# Patient Record
Sex: MALE | Race: WHITE | NOT HISPANIC OR LATINO | Employment: OTHER | URBAN - METROPOLITAN AREA
[De-identification: names, ages, dates, MRNs, and addresses within clinical notes are randomized per-mention and may not be internally consistent; named-entity substitution may affect disease eponyms.]

---

## 2018-04-17 ENCOUNTER — OFFICE VISIT CONVERTED (OUTPATIENT)
Dept: FAMILY MEDICINE CLINIC | Facility: CLINIC | Age: 58
End: 2018-04-17
Attending: NURSE PRACTITIONER

## 2018-05-22 ENCOUNTER — OFFICE VISIT CONVERTED (OUTPATIENT)
Dept: SURGERY | Facility: CLINIC | Age: 58
End: 2018-05-22
Attending: UROLOGY

## 2018-06-08 ENCOUNTER — OFFICE VISIT CONVERTED (OUTPATIENT)
Dept: SURGERY | Facility: CLINIC | Age: 58
End: 2018-06-08
Attending: UROLOGY

## 2018-07-02 ENCOUNTER — OFFICE VISIT CONVERTED (OUTPATIENT)
Dept: SURGERY | Facility: CLINIC | Age: 58
End: 2018-07-02
Attending: UROLOGY

## 2018-07-20 ENCOUNTER — OFFICE VISIT CONVERTED (OUTPATIENT)
Dept: SURGERY | Facility: CLINIC | Age: 58
End: 2018-07-20
Attending: UROLOGY

## 2018-07-31 ENCOUNTER — OFFICE VISIT CONVERTED (OUTPATIENT)
Dept: SURGERY | Facility: CLINIC | Age: 58
End: 2018-07-31
Attending: SURGERY

## 2018-07-31 ENCOUNTER — CONVERSION ENCOUNTER (OUTPATIENT)
Dept: SURGERY | Facility: CLINIC | Age: 58
End: 2018-07-31

## 2018-08-28 ENCOUNTER — OFFICE VISIT CONVERTED (OUTPATIENT)
Dept: SURGERY | Facility: CLINIC | Age: 58
End: 2018-08-28
Attending: SURGERY

## 2018-10-09 ENCOUNTER — OFFICE VISIT CONVERTED (OUTPATIENT)
Dept: FAMILY MEDICINE CLINIC | Facility: CLINIC | Age: 58
End: 2018-10-09
Attending: NURSE PRACTITIONER

## 2018-11-20 ENCOUNTER — OFFICE VISIT CONVERTED (OUTPATIENT)
Dept: SURGERY | Facility: CLINIC | Age: 58
End: 2018-11-20
Attending: SURGERY

## 2019-04-02 ENCOUNTER — HOSPITAL ENCOUNTER (OUTPATIENT)
Dept: FAMILY MEDICINE CLINIC | Facility: CLINIC | Age: 59
Discharge: HOME | End: 2019-04-02
Attending: NURSE PRACTITIONER

## 2019-04-02 ENCOUNTER — OFFICE VISIT CONVERTED (OUTPATIENT)
Dept: FAMILY MEDICINE CLINIC | Facility: CLINIC | Age: 59
End: 2019-04-02
Attending: NURSE PRACTITIONER

## 2019-04-02 LAB
25(OH)D3 SERPL-MCNC: 18 NG/ML (ref 30–100)
ALBUMIN SERPL-MCNC: 4.2 G/DL (ref 3.5–5)
ALBUMIN/GLOB SERPL: 1.2 {RATIO} (ref 1.4–2.6)
ALP SERPL-CCNC: 83 U/L (ref 56–119)
ALT SERPL-CCNC: 38 U/L (ref 10–40)
ANION GAP SERPL CALC-SCNC: 18 MMOL/L (ref 8–19)
AST SERPL-CCNC: 28 U/L (ref 15–50)
BASOPHILS # BLD AUTO: 0.03 10*3/UL (ref 0–0.2)
BASOPHILS NFR BLD AUTO: 0.4 % (ref 0–3)
BILIRUB SERPL-MCNC: 0.31 MG/DL (ref 0.2–1.3)
BUN SERPL-MCNC: 20 MG/DL (ref 5–25)
BUN/CREAT SERPL: 13 {RATIO} (ref 6–20)
CALCIUM SERPL-MCNC: 9.5 MG/DL (ref 8.7–10.4)
CHLORIDE SERPL-SCNC: 103 MMOL/L (ref 99–111)
CHOLEST SERPL-MCNC: 215 MG/DL (ref 107–200)
CHOLEST/HDLC SERPL: 5.8 {RATIO} (ref 3–6)
CONV ABS IMM GRAN: 0.02 10*3/UL (ref 0–0.2)
CONV CO2: 27 MMOL/L (ref 22–32)
CONV IMMATURE GRAN: 0.3 % (ref 0–1.8)
CONV TOTAL PROTEIN: 7.7 G/DL (ref 6.3–8.2)
CREAT UR-MCNC: 1.6 MG/DL (ref 0.7–1.2)
DEPRECATED RDW RBC AUTO: 50.1 FL (ref 35.1–43.9)
EOSINOPHIL # BLD AUTO: 0.1 10*3/UL (ref 0–0.7)
EOSINOPHIL # BLD AUTO: 1.3 % (ref 0–7)
ERYTHROCYTE [DISTWIDTH] IN BLOOD BY AUTOMATED COUNT: 14.6 % (ref 11.6–14.4)
GFR SERPLBLD BASED ON 1.73 SQ M-ARVRAT: 46 ML/MIN/{1.73_M2}
GLOBULIN UR ELPH-MCNC: 3.5 G/DL (ref 2–3.5)
GLUCOSE SERPL-MCNC: 106 MG/DL (ref 70–99)
HBA1C MFR BLD: 14 G/DL (ref 14–18)
HCT VFR BLD AUTO: 44.2 % (ref 42–52)
HDLC SERPL-MCNC: 37 MG/DL (ref 40–60)
LDLC SERPL CALC-MCNC: 127 MG/DL (ref 70–100)
LYMPHOCYTES # BLD AUTO: 1.67 10*3/UL (ref 1–5)
MCH RBC QN AUTO: 29.1 PG (ref 27–31)
MCHC RBC AUTO-ENTMCNC: 31.7 G/DL (ref 33–37)
MCV RBC AUTO: 91.9 FL (ref 80–96)
MONOCYTES # BLD AUTO: 0.44 10*3/UL (ref 0.2–1.2)
MONOCYTES NFR BLD AUTO: 5.7 % (ref 3–10)
NEUTROPHILS # BLD AUTO: 5.46 10*3/UL (ref 2–8)
NEUTROPHILS NFR BLD AUTO: 70.7 % (ref 30–85)
NRBC CBCN: 0 % (ref 0–0.7)
OSMOLALITY SERPL CALC.SUM OF ELEC: 299 MOSM/KG (ref 273–304)
PLATELET # BLD AUTO: 252 10*3/UL (ref 130–400)
PMV BLD AUTO: 10.8 FL (ref 9.4–12.4)
POTASSIUM SERPL-SCNC: 4.7 MMOL/L (ref 3.5–5.3)
RBC # BLD AUTO: 4.81 10*6/UL (ref 4.7–6.1)
SODIUM SERPL-SCNC: 143 MMOL/L (ref 135–147)
T4 FREE SERPL-MCNC: 1.1 NG/DL (ref 0.9–1.8)
TRIGL SERPL-MCNC: 255 MG/DL (ref 40–150)
TSH SERPL-ACNC: 3.5 M[IU]/L (ref 0.27–4.2)
URATE SERPL-MCNC: 10.9 MG/DL (ref 3.5–8.5)
VARIANT LYMPHS NFR BLD MANUAL: 21.6 % (ref 20–45)
VLDLC SERPL-MCNC: 51 MG/DL (ref 5–37)
WBC # BLD AUTO: 7.72 10*3/UL (ref 4.8–10.8)

## 2019-06-27 ENCOUNTER — OFFICE VISIT CONVERTED (OUTPATIENT)
Dept: ORTHOPEDIC SURGERY | Facility: CLINIC | Age: 59
End: 2019-06-27
Attending: ORTHOPAEDIC SURGERY

## 2019-06-27 ENCOUNTER — CONVERSION ENCOUNTER (OUTPATIENT)
Dept: ORTHOPEDIC SURGERY | Facility: CLINIC | Age: 59
End: 2019-06-27

## 2019-10-01 ENCOUNTER — HOSPITAL ENCOUNTER (OUTPATIENT)
Dept: FAMILY MEDICINE CLINIC | Facility: CLINIC | Age: 59
Discharge: HOME | End: 2019-10-01
Attending: NURSE PRACTITIONER

## 2019-10-01 ENCOUNTER — OFFICE VISIT CONVERTED (OUTPATIENT)
Dept: FAMILY MEDICINE CLINIC | Facility: CLINIC | Age: 59
End: 2019-10-01
Attending: NURSE PRACTITIONER

## 2019-10-01 LAB
25(OH)D3 SERPL-MCNC: 24.1 NG/ML (ref 30–100)
ALBUMIN SERPL-MCNC: 4.3 G/DL (ref 3.5–5)
ALBUMIN/GLOB SERPL: 1.2 {RATIO} (ref 1.4–2.6)
ALP SERPL-CCNC: 69 U/L (ref 56–119)
ALT SERPL-CCNC: 23 U/L (ref 10–40)
ANION GAP SERPL CALC-SCNC: 23 MMOL/L (ref 8–19)
AST SERPL-CCNC: 22 U/L (ref 15–50)
BASOPHILS # BLD AUTO: 0.03 10*3/UL (ref 0–0.2)
BASOPHILS NFR BLD AUTO: 0.3 % (ref 0–3)
BILIRUB SERPL-MCNC: 0.29 MG/DL (ref 0.2–1.3)
BUN SERPL-MCNC: 28 MG/DL (ref 5–25)
BUN/CREAT SERPL: 18 {RATIO} (ref 6–20)
CALCIUM SERPL-MCNC: 10 MG/DL (ref 8.7–10.4)
CHLORIDE SERPL-SCNC: 101 MMOL/L (ref 99–111)
CHOLEST SERPL-MCNC: 236 MG/DL (ref 107–200)
CHOLEST/HDLC SERPL: 5.4 {RATIO} (ref 3–6)
CONV ABS IMM GRAN: 0.03 10*3/UL (ref 0–0.2)
CONV CO2: 22 MMOL/L (ref 22–32)
CONV IMMATURE GRAN: 0.3 % (ref 0–1.8)
CONV TOTAL PROTEIN: 7.9 G/DL (ref 6.3–8.2)
CREAT UR-MCNC: 1.58 MG/DL (ref 0.7–1.2)
DEPRECATED RDW RBC AUTO: 47.1 FL (ref 35.1–43.9)
EOSINOPHIL # BLD AUTO: 0.17 10*3/UL (ref 0–0.7)
EOSINOPHIL # BLD AUTO: 1.8 % (ref 0–7)
ERYTHROCYTE [DISTWIDTH] IN BLOOD BY AUTOMATED COUNT: 14.2 % (ref 11.6–14.4)
GFR SERPLBLD BASED ON 1.73 SQ M-ARVRAT: 47 ML/MIN/{1.73_M2}
GLOBULIN UR ELPH-MCNC: 3.6 G/DL (ref 2–3.5)
GLUCOSE SERPL-MCNC: 94 MG/DL (ref 70–99)
HCT VFR BLD AUTO: 43.3 % (ref 42–52)
HDLC SERPL-MCNC: 44 MG/DL (ref 40–60)
HGB BLD-MCNC: 13.8 G/DL (ref 14–18)
LDLC SERPL CALC-MCNC: 160 MG/DL (ref 70–100)
LYMPHOCYTES # BLD AUTO: 1.56 10*3/UL (ref 1–5)
LYMPHOCYTES NFR BLD AUTO: 16.4 % (ref 20–45)
MCH RBC QN AUTO: 29.1 PG (ref 27–31)
MCHC RBC AUTO-ENTMCNC: 31.9 G/DL (ref 33–37)
MCV RBC AUTO: 91.4 FL (ref 80–96)
MONOCYTES # BLD AUTO: 0.59 10*3/UL (ref 0.2–1.2)
MONOCYTES NFR BLD AUTO: 6.2 % (ref 3–10)
NEUTROPHILS # BLD AUTO: 7.13 10*3/UL (ref 2–8)
NEUTROPHILS NFR BLD AUTO: 75 % (ref 30–85)
NRBC CBCN: 0 % (ref 0–0.7)
OSMOLALITY SERPL CALC.SUM OF ELEC: 299 MOSM/KG (ref 273–304)
PLATELET # BLD AUTO: 280 10*3/UL (ref 130–400)
PMV BLD AUTO: 10.3 FL (ref 9.4–12.4)
POTASSIUM SERPL-SCNC: 4.3 MMOL/L (ref 3.5–5.3)
PSA SERPL-MCNC: 2.21 NG/ML (ref 0–4)
RBC # BLD AUTO: 4.74 10*6/UL (ref 4.7–6.1)
SODIUM SERPL-SCNC: 142 MMOL/L (ref 135–147)
T4 FREE SERPL-MCNC: 1.1 NG/DL (ref 0.9–1.8)
TRIGL SERPL-MCNC: 159 MG/DL (ref 40–150)
TSH SERPL-ACNC: 2.99 M[IU]/L (ref 0.27–4.2)
URATE SERPL-MCNC: 5.5 MG/DL (ref 3.5–8.5)
VLDLC SERPL-MCNC: 32 MG/DL (ref 5–37)
WBC # BLD AUTO: 9.51 10*3/UL (ref 4.8–10.8)

## 2019-10-24 ENCOUNTER — OFFICE VISIT CONVERTED (OUTPATIENT)
Dept: FAMILY MEDICINE CLINIC | Facility: CLINIC | Age: 59
End: 2019-10-24
Attending: NURSE PRACTITIONER

## 2020-01-01 ENCOUNTER — OFFICE VISIT CONVERTED (OUTPATIENT)
Dept: FAMILY MEDICINE CLINIC | Facility: CLINIC | Age: 60
End: 2020-01-01
Attending: NURSE PRACTITIONER

## 2020-01-01 ENCOUNTER — HOSPITAL ENCOUNTER (OUTPATIENT)
Dept: GENERAL RADIOLOGY | Facility: HOSPITAL | Age: 60
Discharge: HOME | End: 2020-10-02
Attending: NURSE PRACTITIONER

## 2020-01-01 LAB
25(OH)D3 SERPL-MCNC: 28.7 NG/ML (ref 30–100)
ALBUMIN SERPL-MCNC: 4.4 G/DL (ref 3.5–5)
ALBUMIN/GLOB SERPL: 1.3 {RATIO} (ref 1.4–2.6)
ALP SERPL-CCNC: 75 U/L (ref 56–155)
ALT SERPL-CCNC: 31 U/L (ref 10–40)
ANION GAP SERPL CALC-SCNC: 22 MMOL/L (ref 8–19)
AST SERPL-CCNC: 29 U/L (ref 15–50)
BASOPHILS # BLD AUTO: 0.04 10*3/UL (ref 0–0.2)
BASOPHILS NFR BLD AUTO: 0.4 % (ref 0–3)
BILIRUB SERPL-MCNC: 0.33 MG/DL (ref 0.2–1.3)
BUN SERPL-MCNC: 31 MG/DL (ref 5–25)
BUN/CREAT SERPL: 16 {RATIO} (ref 6–20)
CALCIUM SERPL-MCNC: 10.3 MG/DL (ref 8.7–10.4)
CHLORIDE SERPL-SCNC: 103 MMOL/L (ref 99–111)
CHOLEST SERPL-MCNC: 247 MG/DL (ref 107–200)
CHOLEST/HDLC SERPL: 7.1 {RATIO} (ref 3–6)
CONV ABS IMM GRAN: 0.05 10*3/UL (ref 0–0.2)
CONV CO2: 23 MMOL/L (ref 22–32)
CONV IMMATURE GRAN: 0.5 % (ref 0–1.8)
CONV TOTAL PROTEIN: 7.7 G/DL (ref 6.3–8.2)
CREAT UR-MCNC: 1.93 MG/DL (ref 0.7–1.2)
DEPRECATED RDW RBC AUTO: 48 FL (ref 35.1–43.9)
EOSINOPHIL # BLD AUTO: 0.05 10*3/UL (ref 0–0.7)
EOSINOPHIL # BLD AUTO: 0.5 % (ref 0–7)
ERYTHROCYTE [DISTWIDTH] IN BLOOD BY AUTOMATED COUNT: 14.3 % (ref 11.6–14.4)
GFR SERPLBLD BASED ON 1.73 SQ M-ARVRAT: 36 ML/MIN/{1.73_M2}
GLOBULIN UR ELPH-MCNC: 3.3 G/DL (ref 2–3.5)
GLUCOSE SERPL-MCNC: 109 MG/DL (ref 70–99)
HCT VFR BLD AUTO: 43.9 % (ref 42–52)
HDLC SERPL-MCNC: 35 MG/DL (ref 40–60)
HGB BLD-MCNC: 13.7 G/DL (ref 14–18)
LDLC SERPL CALC-MCNC: 145 MG/DL (ref 70–100)
LYMPHOCYTES # BLD AUTO: 1.79 10*3/UL (ref 1–5)
LYMPHOCYTES NFR BLD AUTO: 16.8 % (ref 20–45)
MCH RBC QN AUTO: 28.5 PG (ref 27–31)
MCHC RBC AUTO-ENTMCNC: 31.2 G/DL (ref 33–37)
MCV RBC AUTO: 91.3 FL (ref 80–96)
MONOCYTES # BLD AUTO: 0.56 10*3/UL (ref 0.2–1.2)
MONOCYTES NFR BLD AUTO: 5.2 % (ref 3–10)
NEUTROPHILS # BLD AUTO: 8.18 10*3/UL (ref 2–8)
NEUTROPHILS NFR BLD AUTO: 76.6 % (ref 30–85)
NRBC CBCN: 0 % (ref 0–0.7)
OSMOLALITY SERPL CALC.SUM OF ELEC: 305 MOSM/KG (ref 273–304)
PLATELET # BLD AUTO: 301 10*3/UL (ref 130–400)
PMV BLD AUTO: 10.7 FL (ref 9.4–12.4)
POTASSIUM SERPL-SCNC: 4.4 MMOL/L (ref 3.5–5.3)
PSA SERPL-MCNC: 2.33 NG/ML (ref 0–4)
RBC # BLD AUTO: 4.81 10*6/UL (ref 4.7–6.1)
SODIUM SERPL-SCNC: 144 MMOL/L (ref 135–147)
TRIGL SERPL-MCNC: 336 MG/DL (ref 40–150)
TSH SERPL-ACNC: 2.28 M[IU]/L (ref 0.27–4.2)
URATE SERPL-MCNC: 7.1 MG/DL (ref 3.5–8.5)
VLDLC SERPL-MCNC: 67 MG/DL (ref 5–37)
WBC # BLD AUTO: 10.67 10*3/UL (ref 4.8–10.8)

## 2020-03-18 ENCOUNTER — OFFICE VISIT CONVERTED (OUTPATIENT)
Dept: FAMILY MEDICINE CLINIC | Facility: CLINIC | Age: 60
End: 2020-03-18
Attending: NURSE PRACTITIONER

## 2020-03-31 ENCOUNTER — TELEMEDICINE CONVERTED (OUTPATIENT)
Dept: FAMILY MEDICINE CLINIC | Facility: CLINIC | Age: 60
End: 2020-03-31
Attending: NURSE PRACTITIONER

## 2021-01-01 ENCOUNTER — TELEPHONE (OUTPATIENT)
Dept: FAMILY MEDICINE CLINIC | Facility: CLINIC | Age: 61
End: 2021-01-01

## 2021-01-01 ENCOUNTER — APPOINTMENT (OUTPATIENT)
Dept: GENERAL RADIOLOGY | Facility: HOSPITAL | Age: 61
End: 2021-01-01

## 2021-01-01 ENCOUNTER — PREP FOR SURGERY (OUTPATIENT)
Dept: OTHER | Facility: HOSPITAL | Age: 61
End: 2021-01-01

## 2021-01-01 ENCOUNTER — ANESTHESIA EVENT (OUTPATIENT)
Dept: PERIOP | Facility: HOSPITAL | Age: 61
End: 2021-01-01

## 2021-01-01 ENCOUNTER — HOSPITAL ENCOUNTER (INPATIENT)
Facility: HOSPITAL | Age: 61
LOS: 10 days | End: 2021-09-16
Attending: EMERGENCY MEDICINE | Admitting: INTERNAL MEDICINE

## 2021-01-01 ENCOUNTER — APPOINTMENT (OUTPATIENT)
Dept: ULTRASOUND IMAGING | Facility: HOSPITAL | Age: 61
End: 2021-01-01

## 2021-01-01 ENCOUNTER — ANESTHESIA (OUTPATIENT)
Dept: PERIOP | Facility: HOSPITAL | Age: 61
End: 2021-01-01

## 2021-01-01 ENCOUNTER — HOSPITAL ENCOUNTER (OUTPATIENT)
Dept: FAMILY MEDICINE CLINIC | Facility: CLINIC | Age: 61
Discharge: HOME | End: 2021-04-01
Attending: NURSE PRACTITIONER

## 2021-01-01 ENCOUNTER — ANESTHESIA EVENT (OUTPATIENT)
Dept: ICU | Facility: HOSPITAL | Age: 61
End: 2021-01-01

## 2021-01-01 ENCOUNTER — OFFICE VISIT (OUTPATIENT)
Dept: FAMILY MEDICINE CLINIC | Facility: CLINIC | Age: 61
End: 2021-01-01

## 2021-01-01 ENCOUNTER — APPOINTMENT (OUTPATIENT)
Dept: CT IMAGING | Facility: HOSPITAL | Age: 61
End: 2021-01-01

## 2021-01-01 ENCOUNTER — OFFICE VISIT CONVERTED (OUTPATIENT)
Dept: FAMILY MEDICINE CLINIC | Facility: CLINIC | Age: 61
End: 2021-01-01
Attending: NURSE PRACTITIONER

## 2021-01-01 ENCOUNTER — APPOINTMENT (OUTPATIENT)
Dept: CARDIOLOGY | Facility: HOSPITAL | Age: 61
End: 2021-01-01

## 2021-01-01 VITALS
DIASTOLIC BLOOD PRESSURE: 82 MMHG | WEIGHT: 209.25 LBS | SYSTOLIC BLOOD PRESSURE: 137 MMHG | HEIGHT: 69 IN | OXYGEN SATURATION: 97 % | TEMPERATURE: 97.4 F | HEART RATE: 72 BPM | RESPIRATION RATE: 16 BRPM | BODY MASS INDEX: 30.99 KG/M2

## 2021-01-01 VITALS
TEMPERATURE: 97.2 F | WEIGHT: 213.31 LBS | HEIGHT: 69 IN | DIASTOLIC BLOOD PRESSURE: 82 MMHG | OXYGEN SATURATION: 96 % | RESPIRATION RATE: 20 BRPM | HEART RATE: 61 BPM | SYSTOLIC BLOOD PRESSURE: 150 MMHG | BODY MASS INDEX: 31.6 KG/M2

## 2021-01-01 VITALS
DIASTOLIC BLOOD PRESSURE: 86 MMHG | HEART RATE: 68 BPM | WEIGHT: 198.44 LBS | OXYGEN SATURATION: 98 % | TEMPERATURE: 98.6 F | SYSTOLIC BLOOD PRESSURE: 150 MMHG | HEIGHT: 69 IN | BODY MASS INDEX: 29.39 KG/M2 | RESPIRATION RATE: 12 BRPM

## 2021-01-01 VITALS — RESPIRATION RATE: 14 BRPM | WEIGHT: 200 LBS | BODY MASS INDEX: 29.62 KG/M2 | HEIGHT: 69 IN

## 2021-01-01 VITALS
DIASTOLIC BLOOD PRESSURE: 103 MMHG | SYSTOLIC BLOOD PRESSURE: 172 MMHG | HEART RATE: 59 BPM | TEMPERATURE: 97.9 F | RESPIRATION RATE: 12 BRPM | BODY MASS INDEX: 30.21 KG/M2 | HEIGHT: 69 IN | OXYGEN SATURATION: 98 % | WEIGHT: 204 LBS

## 2021-01-01 VITALS
OXYGEN SATURATION: 96 % | BODY MASS INDEX: 31.61 KG/M2 | WEIGHT: 213.44 LBS | TEMPERATURE: 96.9 F | RESPIRATION RATE: 14 BRPM | HEIGHT: 69 IN | HEART RATE: 70 BPM | DIASTOLIC BLOOD PRESSURE: 85 MMHG | SYSTOLIC BLOOD PRESSURE: 130 MMHG

## 2021-01-01 VITALS
HEIGHT: 69 IN | OXYGEN SATURATION: 99 % | SYSTOLIC BLOOD PRESSURE: 150 MMHG | BODY MASS INDEX: 32.45 KG/M2 | RESPIRATION RATE: 14 BRPM | TEMPERATURE: 97.7 F | DIASTOLIC BLOOD PRESSURE: 98 MMHG | HEART RATE: 63 BPM | WEIGHT: 219.1 LBS

## 2021-01-01 VITALS
OXYGEN SATURATION: 98 % | HEART RATE: 76 BPM | SYSTOLIC BLOOD PRESSURE: 156 MMHG | HEIGHT: 69 IN | TEMPERATURE: 98.7 F | BODY MASS INDEX: 33.08 KG/M2 | WEIGHT: 223.37 LBS | RESPIRATION RATE: 12 BRPM | DIASTOLIC BLOOD PRESSURE: 97 MMHG

## 2021-01-01 VITALS
HEART RATE: 67 BPM | BODY MASS INDEX: 31.47 KG/M2 | TEMPERATURE: 98.3 F | DIASTOLIC BLOOD PRESSURE: 95 MMHG | HEIGHT: 69 IN | SYSTOLIC BLOOD PRESSURE: 164 MMHG | WEIGHT: 212.5 LBS | OXYGEN SATURATION: 98 % | RESPIRATION RATE: 12 BRPM

## 2021-01-01 VITALS — BODY MASS INDEX: 29.62 KG/M2 | RESPIRATION RATE: 16 BRPM | WEIGHT: 200 LBS | HEIGHT: 69 IN

## 2021-01-01 VITALS — HEART RATE: 72 BPM | HEIGHT: 69 IN | BODY MASS INDEX: 29.68 KG/M2 | WEIGHT: 200.37 LBS | OXYGEN SATURATION: 99 %

## 2021-01-01 VITALS — RESPIRATION RATE: 14 BRPM | BODY MASS INDEX: 29.92 KG/M2 | HEIGHT: 69 IN | WEIGHT: 202 LBS

## 2021-01-01 VITALS
WEIGHT: 201.31 LBS | RESPIRATION RATE: 12 BRPM | TEMPERATURE: 98.2 F | OXYGEN SATURATION: 99 % | HEIGHT: 69 IN | HEART RATE: 59 BPM | DIASTOLIC BLOOD PRESSURE: 95 MMHG | BODY MASS INDEX: 29.82 KG/M2 | SYSTOLIC BLOOD PRESSURE: 163 MMHG

## 2021-01-01 VITALS
OXYGEN SATURATION: 91 % | TEMPERATURE: 98.8 F | HEART RATE: 135 BPM | RESPIRATION RATE: 22 BRPM | WEIGHT: 215.61 LBS | DIASTOLIC BLOOD PRESSURE: 94 MMHG | SYSTOLIC BLOOD PRESSURE: 108 MMHG | BODY MASS INDEX: 31.93 KG/M2 | HEIGHT: 69 IN

## 2021-01-01 VITALS — RESPIRATION RATE: 16 BRPM | HEIGHT: 69 IN | BODY MASS INDEX: 29.92 KG/M2 | WEIGHT: 202 LBS

## 2021-01-01 VITALS — WEIGHT: 200 LBS | BODY MASS INDEX: 29.62 KG/M2 | HEIGHT: 69 IN | RESPIRATION RATE: 16 BRPM

## 2021-01-01 VITALS — BODY MASS INDEX: 29.62 KG/M2 | WEIGHT: 200 LBS | HEIGHT: 69 IN | RESPIRATION RATE: 16 BRPM

## 2021-01-01 VITALS — BODY MASS INDEX: 29.92 KG/M2 | HEIGHT: 69 IN | WEIGHT: 202 LBS | RESPIRATION RATE: 16 BRPM

## 2021-01-01 DIAGNOSIS — J12.82 PNEUMONIA DUE TO COVID-19 VIRUS: ICD-10-CM

## 2021-01-01 DIAGNOSIS — M10.9 GOUT, UNSPECIFIED CAUSE, UNSPECIFIED CHRONICITY, UNSPECIFIED SITE: Primary | ICD-10-CM

## 2021-01-01 DIAGNOSIS — N13.30 HYDRONEPHROSIS: ICD-10-CM

## 2021-01-01 DIAGNOSIS — M1A.0790 IDIOPATHIC CHRONIC GOUT OF ANKLE WITHOUT TOPHUS, UNSPECIFIED LATERALITY: Primary | ICD-10-CM

## 2021-01-01 DIAGNOSIS — N17.9 AKI (ACUTE KIDNEY INJURY) (HCC): Primary | ICD-10-CM

## 2021-01-01 DIAGNOSIS — M10.9 GOUT, UNSPECIFIED CAUSE, UNSPECIFIED CHRONICITY, UNSPECIFIED SITE: ICD-10-CM

## 2021-01-01 DIAGNOSIS — U07.1 PNEUMONIA DUE TO COVID-19 VIRUS: ICD-10-CM

## 2021-01-01 LAB
027 TOXIN: NORMAL
25(OH)D3 SERPL-MCNC: 29.8 NG/ML (ref 30–100)
ALBUMIN SERPL-MCNC: 2.3 G/DL (ref 3.5–5.2)
ALBUMIN SERPL-MCNC: 2.8 G/DL (ref 3.5–5.2)
ALBUMIN SERPL-MCNC: 3 G/DL (ref 3.5–5.2)
ALBUMIN SERPL-MCNC: 3 G/DL (ref 3.5–5.2)
ALBUMIN SERPL-MCNC: 3.1 G/DL (ref 3.5–5.2)
ALBUMIN SERPL-MCNC: 3.2 G/DL (ref 3.5–5.2)
ALBUMIN SERPL-MCNC: 3.3 G/DL (ref 3.5–5.2)
ALBUMIN SERPL-MCNC: 3.3 G/DL (ref 3.5–5.2)
ALBUMIN SERPL-MCNC: 3.4 G/DL (ref 3.5–5.2)
ALBUMIN SERPL-MCNC: 3.7 G/DL (ref 3.5–5.2)
ALBUMIN SERPL-MCNC: 4.1 G/DL (ref 3.5–5)
ALBUMIN/GLOB SERPL: 0.8 G/DL
ALBUMIN/GLOB SERPL: 0.9 G/DL
ALBUMIN/GLOB SERPL: 1 G/DL
ALBUMIN/GLOB SERPL: 1.1 G/DL
ALBUMIN/GLOB SERPL: 1.3 {RATIO} (ref 1.4–2.6)
ALP SERPL-CCNC: 110 U/L (ref 39–117)
ALP SERPL-CCNC: 127 U/L (ref 39–117)
ALP SERPL-CCNC: 136 U/L (ref 39–117)
ALP SERPL-CCNC: 58 U/L (ref 39–117)
ALP SERPL-CCNC: 61 U/L (ref 39–117)
ALP SERPL-CCNC: 77 U/L (ref 56–155)
ALP SERPL-CCNC: 82 U/L (ref 39–117)
ALP SERPL-CCNC: 91 U/L (ref 39–117)
ALT SERPL W P-5'-P-CCNC: 28 U/L (ref 1–41)
ALT SERPL W P-5'-P-CCNC: 30 U/L (ref 1–41)
ALT SERPL W P-5'-P-CCNC: 32 U/L (ref 1–41)
ALT SERPL W P-5'-P-CCNC: 33 U/L (ref 1–41)
ALT SERPL W P-5'-P-CCNC: 35 U/L (ref 1–41)
ALT SERPL W P-5'-P-CCNC: 46 U/L (ref 1–41)
ALT SERPL W P-5'-P-CCNC: 56 U/L (ref 1–41)
ALT SERPL-CCNC: 24 U/L (ref 10–40)
ANION GAP SERPL CALC-SCNC: 16 MMOL/L (ref 8–19)
ANION GAP SERPL CALCULATED.3IONS-SCNC: 13.7 MMOL/L (ref 5–15)
ANION GAP SERPL CALCULATED.3IONS-SCNC: 14.2 MMOL/L (ref 5–15)
ANION GAP SERPL CALCULATED.3IONS-SCNC: 15.1 MMOL/L (ref 5–15)
ANION GAP SERPL CALCULATED.3IONS-SCNC: 15.2 MMOL/L (ref 5–15)
ANION GAP SERPL CALCULATED.3IONS-SCNC: 15.4 MMOL/L (ref 5–15)
ANION GAP SERPL CALCULATED.3IONS-SCNC: 15.9 MMOL/L (ref 5–15)
ANION GAP SERPL CALCULATED.3IONS-SCNC: 17.6 MMOL/L (ref 5–15)
ANION GAP SERPL CALCULATED.3IONS-SCNC: 18.1 MMOL/L (ref 5–15)
ANION GAP SERPL CALCULATED.3IONS-SCNC: 18.5 MMOL/L (ref 5–15)
ANION GAP SERPL CALCULATED.3IONS-SCNC: 18.6 MMOL/L (ref 5–15)
ANION GAP SERPL CALCULATED.3IONS-SCNC: 19.3 MMOL/L (ref 5–15)
ANION GAP SERPL CALCULATED.3IONS-SCNC: 20 MMOL/L (ref 5–15)
ANION GAP SERPL CALCULATED.3IONS-SCNC: 21 MMOL/L (ref 5–15)
ANION GAP SERPL CALCULATED.3IONS-SCNC: 30.6 MMOL/L (ref 5–15)
APTT PPP: 21.9 SECONDS (ref 22.2–34.2)
APTT PPP: 25.2 SECONDS (ref 22.2–34.2)
APTT PPP: 26.3 SECONDS (ref 22.2–34.2)
ARTERIAL PATENCY WRIST A: ABNORMAL
ARTERIAL PATENCY WRIST A: POSITIVE
AST SERPL-CCNC: 22 U/L (ref 15–50)
AST SERPL-CCNC: 34 U/L (ref 1–40)
AST SERPL-CCNC: 34 U/L (ref 1–40)
AST SERPL-CCNC: 35 U/L (ref 1–40)
AST SERPL-CCNC: 44 U/L (ref 1–40)
AST SERPL-CCNC: 49 U/L (ref 1–40)
AST SERPL-CCNC: 68 U/L (ref 1–40)
AST SERPL-CCNC: 89 U/L (ref 1–40)
BACTERIA SPEC AEROBE CULT: NO GROWTH
BACTERIA UR QL AUTO: ABNORMAL /HPF
BACTERIA UR QL AUTO: ABNORMAL /HPF
BASE EXCESS BLDA CALC-SCNC: -0.8 MMOL/L (ref -2–2)
BASE EXCESS BLDA CALC-SCNC: -2.3 MMOL/L (ref -2–2)
BASE EXCESS BLDA CALC-SCNC: -3.1 MMOL/L (ref -2–2)
BASE EXCESS BLDA CALC-SCNC: -7.6 MMOL/L (ref -2–2)
BASOPHILS # BLD AUTO: 0 10*3/MM3 (ref 0–0.2)
BASOPHILS # BLD AUTO: 0.01 10*3/MM3 (ref 0–0.2)
BASOPHILS # BLD AUTO: 0.02 10*3/UL (ref 0–0.2)
BASOPHILS # BLD AUTO: 0.03 10*3/MM3 (ref 0–0.2)
BASOPHILS # BLD AUTO: 0.08 10*3/MM3 (ref 0–0.2)
BASOPHILS # BLD AUTO: 0.13 10*3/MM3 (ref 0–0.2)
BASOPHILS NFR BLD AUTO: 0 % (ref 0–1.5)
BASOPHILS NFR BLD AUTO: 0.1 % (ref 0–1.5)
BASOPHILS NFR BLD AUTO: 0.2 % (ref 0–3)
BASOPHILS NFR BLD AUTO: 0.3 % (ref 0–1.5)
BDY SITE: ABNORMAL
BH CV ECHO MEAS - AO ROOT DIAM: 3 CM
BH CV ECHO MEAS - EDV(MOD-SP4): 57 ML
BH CV ECHO MEAS - ESV(MOD-SP4): 25 ML
BH CV ECHO MEAS - IVSD: 1.3 CM
BH CV ECHO MEAS - LA DIMENSION(2D): 3.6 CM
BH CV ECHO MEAS - LVIDD: 4.6 CM
BH CV ECHO MEAS - LVIDS: 2.7 CM
BH CV ECHO MEAS - LVPWD: 1.2 CM
BH CV LOW VAS RIGHT SOLEAL VESSEL: 1
BH CV LOWER VASCULAR LEFT POPLITEAL AUGMENT: NORMAL
BH CV LOWER VASCULAR LEFT POPLITEAL COMPETENT: NORMAL
BH CV LOWER VASCULAR LEFT POPLITEAL COMPRESS: NORMAL
BH CV LOWER VASCULAR LEFT POPLITEAL PHASIC: NORMAL
BH CV LOWER VASCULAR LEFT POPLITEAL SPONT: NORMAL
BH CV LOWER VASCULAR RIGHT COMMON FEMORAL AUGMENT: NORMAL
BH CV LOWER VASCULAR RIGHT COMMON FEMORAL COMPETENT: NORMAL
BH CV LOWER VASCULAR RIGHT COMMON FEMORAL COMPRESS: NORMAL
BH CV LOWER VASCULAR RIGHT COMMON FEMORAL PHASIC: NORMAL
BH CV LOWER VASCULAR RIGHT COMMON FEMORAL SPONT: NORMAL
BH CV LOWER VASCULAR RIGHT MID FEMORAL AUGMENT: NORMAL
BH CV LOWER VASCULAR RIGHT MID FEMORAL COMPETENT: NORMAL
BH CV LOWER VASCULAR RIGHT MID FEMORAL COMPRESS: NORMAL
BH CV LOWER VASCULAR RIGHT MID FEMORAL PHASIC: NORMAL
BH CV LOWER VASCULAR RIGHT MID FEMORAL SPONT: NORMAL
BH CV LOWER VASCULAR RIGHT POPLITEAL AUGMENT: NORMAL
BH CV LOWER VASCULAR RIGHT POPLITEAL COMPETENT: NORMAL
BH CV LOWER VASCULAR RIGHT POPLITEAL COMPRESS: NORMAL
BH CV LOWER VASCULAR RIGHT POPLITEAL PHASIC: NORMAL
BH CV LOWER VASCULAR RIGHT POPLITEAL SPONT: NORMAL
BH CV LOWER VASCULAR RIGHT PROXIMAL FEMORAL COMPRESS: NORMAL
BH CV LOWER VASCULAR RIGHT SOLEAL COMPRESS: NORMAL
BH CV LOWER VASCULAR RIGHT SOLEAL THROMBUS: NORMAL
BILIRUB SERPL-MCNC: 0.3 MG/DL (ref 0–1.2)
BILIRUB SERPL-MCNC: 0.35 MG/DL (ref 0.2–1.3)
BILIRUB SERPL-MCNC: 0.4 MG/DL (ref 0–1.2)
BILIRUB SERPL-MCNC: 0.4 MG/DL (ref 0–1.2)
BILIRUB SERPL-MCNC: 0.6 MG/DL (ref 0–1.2)
BILIRUB SERPL-MCNC: 1 MG/DL (ref 0–1.2)
BILIRUB SERPL-MCNC: 1.2 MG/DL (ref 0–1.2)
BILIRUB SERPL-MCNC: 1.9 MG/DL (ref 0–1.2)
BILIRUB UR QL STRIP: ABNORMAL
BILIRUB UR QL STRIP: NEGATIVE
BUN SERPL-MCNC: 101 MG/DL (ref 8–23)
BUN SERPL-MCNC: 108 MG/DL (ref 8–23)
BUN SERPL-MCNC: 110 MG/DL (ref 8–23)
BUN SERPL-MCNC: 123 MG/DL (ref 8–23)
BUN SERPL-MCNC: 126 MG/DL (ref 8–23)
BUN SERPL-MCNC: 138 MG/DL (ref 8–23)
BUN SERPL-MCNC: 139 MG/DL (ref 8–23)
BUN SERPL-MCNC: 141 MG/DL (ref 8–23)
BUN SERPL-MCNC: 157 MG/DL (ref 8–23)
BUN SERPL-MCNC: 29 MG/DL (ref 5–25)
BUN SERPL-MCNC: 77 MG/DL (ref 8–23)
BUN SERPL-MCNC: 77 MG/DL (ref 8–23)
BUN SERPL-MCNC: 81 MG/DL (ref 8–23)
BUN SERPL-MCNC: 83 MG/DL (ref 8–23)
BUN SERPL-MCNC: 92 MG/DL (ref 8–23)
BUN/CREAT SERPL: 11.2 (ref 7–25)
BUN/CREAT SERPL: 13.2 (ref 7–25)
BUN/CREAT SERPL: 14.9 (ref 7–25)
BUN/CREAT SERPL: 16.9 (ref 7–25)
BUN/CREAT SERPL: 17 {RATIO} (ref 6–20)
BUN/CREAT SERPL: 21.9 (ref 7–25)
BUN/CREAT SERPL: 24.4 (ref 7–25)
BUN/CREAT SERPL: 25.2 (ref 7–25)
BUN/CREAT SERPL: 26.5 (ref 7–25)
BUN/CREAT SERPL: 28.6 (ref 7–25)
BUN/CREAT SERPL: 30.8 (ref 7–25)
BUN/CREAT SERPL: 31.7 (ref 7–25)
BUN/CREAT SERPL: 36.9 (ref 7–25)
BUN/CREAT SERPL: 45.4 (ref 7–25)
BUN/CREAT SERPL: 47 (ref 7–25)
C DIFF TOX GENS STL QL NAA+PROBE: NEGATIVE
CA-I BLDA-SCNC: 0.75 MMOL/L (ref 1.13–1.32)
CA-I BLDA-SCNC: 0.95 MMOL/L (ref 1.13–1.32)
CA-I BLDA-SCNC: 1.25 MMOL/L (ref 1.13–1.32)
CALCIUM SERPL-MCNC: 9.7 MG/DL (ref 8.7–10.4)
CALCIUM SPEC-SCNC: 10.3 MG/DL (ref 8.6–10.5)
CALCIUM SPEC-SCNC: 4.8 MG/DL (ref 8.6–10.5)
CALCIUM SPEC-SCNC: 6.9 MG/DL (ref 8.6–10.5)
CALCIUM SPEC-SCNC: 8.4 MG/DL (ref 8.6–10.5)
CALCIUM SPEC-SCNC: 8.5 MG/DL (ref 8.6–10.5)
CALCIUM SPEC-SCNC: 9.1 MG/DL (ref 8.6–10.5)
CALCIUM SPEC-SCNC: 9.2 MG/DL (ref 8.6–10.5)
CALCIUM SPEC-SCNC: 9.2 MG/DL (ref 8.6–10.5)
CALCIUM SPEC-SCNC: 9.3 MG/DL (ref 8.6–10.5)
CALCIUM SPEC-SCNC: 9.3 MG/DL (ref 8.6–10.5)
CALCIUM SPEC-SCNC: 9.6 MG/DL (ref 8.6–10.5)
CALCIUM SPEC-SCNC: 9.7 MG/DL (ref 8.6–10.5)
CHLORIDE BLDA-SCNC: 110 MMOL/L (ref 98–106)
CHLORIDE BLDA-SCNC: 118 MMOL/L (ref 98–106)
CHLORIDE SERPL-SCNC: 101 MMOL/L (ref 98–107)
CHLORIDE SERPL-SCNC: 102 MMOL/L (ref 98–107)
CHLORIDE SERPL-SCNC: 105 MMOL/L (ref 99–111)
CHLORIDE SERPL-SCNC: 106 MMOL/L (ref 98–107)
CHLORIDE SERPL-SCNC: 106 MMOL/L (ref 98–107)
CHLORIDE SERPL-SCNC: 107 MMOL/L (ref 98–107)
CHLORIDE SERPL-SCNC: 108 MMOL/L (ref 98–107)
CHLORIDE SERPL-SCNC: 109 MMOL/L (ref 98–107)
CHLORIDE SERPL-SCNC: 110 MMOL/L (ref 98–107)
CHLORIDE SERPL-SCNC: 110 MMOL/L (ref 98–107)
CHLORIDE SERPL-SCNC: 112 MMOL/L (ref 98–107)
CHLORIDE SERPL-SCNC: 117 MMOL/L (ref 98–107)
CHLORIDE SERPL-SCNC: 87 MMOL/L (ref 98–107)
CHLORIDE SERPL-SCNC: 98 MMOL/L (ref 98–107)
CHLORIDE SERPL-SCNC: 99 MMOL/L (ref 98–107)
CHLORIDE UR-SCNC: 36 MMOL/L
CHOLEST SERPL-MCNC: 207 MG/DL (ref 107–200)
CHOLEST/HDLC SERPL: 6.3 {RATIO} (ref 3–6)
CK SERPL-CCNC: 826 U/L (ref 20–200)
CLARITY UR: ABNORMAL
CLARITY UR: CLEAR
CLUMPED PLATELETS: PRESENT
CO2 SERPL-SCNC: 13 MMOL/L (ref 22–29)
CO2 SERPL-SCNC: 13.4 MMOL/L (ref 22–29)
CO2 SERPL-SCNC: 13.5 MMOL/L (ref 22–29)
CO2 SERPL-SCNC: 14.7 MMOL/L (ref 22–29)
CO2 SERPL-SCNC: 15.1 MMOL/L (ref 22–29)
CO2 SERPL-SCNC: 15.4 MMOL/L (ref 22–29)
CO2 SERPL-SCNC: 16.4 MMOL/L (ref 22–29)
CO2 SERPL-SCNC: 16.6 MMOL/L (ref 22–29)
CO2 SERPL-SCNC: 17 MMOL/L (ref 22–29)
CO2 SERPL-SCNC: 17.8 MMOL/L (ref 22–29)
CO2 SERPL-SCNC: 17.9 MMOL/L (ref 22–29)
CO2 SERPL-SCNC: 20.3 MMOL/L (ref 22–29)
CO2 SERPL-SCNC: 20.9 MMOL/L (ref 22–29)
CO2 SERPL-SCNC: 21.8 MMOL/L (ref 22–29)
COHGB MFR BLD: 0.3 % (ref 0–1.5)
COLOR UR: YELLOW
COLOR UR: YELLOW
CONV ABS IMM GRAN: 0.03 10*3/UL (ref 0–0.2)
CONV CO2: 26 MMOL/L (ref 22–32)
CONV IMMATURE GRAN: 0.4 % (ref 0–1.8)
CONV TOTAL PROTEIN: 7.3 G/DL (ref 6.3–8.2)
CREAT SERPL-MCNC: 12.38 MG/DL (ref 0.76–1.27)
CREAT SERPL-MCNC: 2.25 MG/DL (ref 0.76–1.27)
CREAT SERPL-MCNC: 2.38 MG/DL (ref 0.76–1.27)
CREAT SERPL-MCNC: 2.43 MG/DL (ref 0.76–1.27)
CREAT SERPL-MCNC: 2.69 MG/DL (ref 0.76–1.27)
CREAT SERPL-MCNC: 3 MG/DL (ref 0.76–1.27)
CREAT SERPL-MCNC: 3.06 MG/DL (ref 0.76–1.27)
CREAT SERPL-MCNC: 3.77 MG/DL (ref 0.76–1.27)
CREAT SERPL-MCNC: 4.61 MG/DL (ref 0.76–1.27)
CREAT SERPL-MCNC: 5.09 MG/DL (ref 0.76–1.27)
CREAT SERPL-MCNC: 5.48 MG/DL (ref 0.76–1.27)
CREAT SERPL-MCNC: 6.52 MG/DL (ref 0.76–1.27)
CREAT SERPL-MCNC: 8.28 MG/DL (ref 0.76–1.27)
CREAT SERPL-MCNC: 9.51 MG/DL (ref 0.76–1.27)
CREAT UR-MCNC: 1.7 MG/DL (ref 0.7–1.2)
CREAT UR-MCNC: 92.5 MG/DL
CRP SERPL-MCNC: 1.79 MG/DL (ref 0–0.5)
CRP SERPL-MCNC: 23.71 MG/DL (ref 0–0.5)
D DIMER PPP FEU-MCNC: 1.26 MG/L (FEU) (ref 0–0.59)
D DIMER PPP FEU-MCNC: 1.5 MG/L (FEU) (ref 0–0.59)
D-DIMER HIGH CURVE: 32.37 MG/L (FEU) (ref 0–0.59)
DEPRECATED RDW RBC AUTO: 44.9 FL (ref 37–54)
DEPRECATED RDW RBC AUTO: 45.3 FL (ref 35.1–43.9)
DEPRECATED RDW RBC AUTO: 45.4 FL (ref 37–54)
DEPRECATED RDW RBC AUTO: 46 FL (ref 37–54)
DEPRECATED RDW RBC AUTO: 46.2 FL (ref 37–54)
DEPRECATED RDW RBC AUTO: 46.3 FL (ref 37–54)
DEPRECATED RDW RBC AUTO: 46.7 FL (ref 37–54)
DEPRECATED RDW RBC AUTO: 48.5 FL (ref 37–54)
DEPRECATED RDW RBC AUTO: 48.9 FL (ref 37–54)
DEPRECATED RDW RBC AUTO: 49.9 FL (ref 37–54)
DEPRECATED RDW RBC AUTO: 50.4 FL (ref 37–54)
DEPRECATED RDW RBC AUTO: 53.4 FL (ref 37–54)
EOSINOPHIL # BLD AUTO: 0 10*3/MM3 (ref 0–0.4)
EOSINOPHIL # BLD AUTO: 0.02 10*3/MM3 (ref 0–0.4)
EOSINOPHIL # BLD AUTO: 0.09 10*3/UL (ref 0–0.7)
EOSINOPHIL # BLD AUTO: 1.1 % (ref 0–7)
EOSINOPHIL NFR BLD AUTO: 0 % (ref 0.3–6.2)
ERYTHROCYTE [DISTWIDTH] IN BLOOD BY AUTOMATED COUNT: 14.2 % (ref 11.6–14.4)
ERYTHROCYTE [DISTWIDTH] IN BLOOD BY AUTOMATED COUNT: 14.3 % (ref 12.3–15.4)
ERYTHROCYTE [DISTWIDTH] IN BLOOD BY AUTOMATED COUNT: 14.6 % (ref 12.3–15.4)
ERYTHROCYTE [DISTWIDTH] IN BLOOD BY AUTOMATED COUNT: 14.6 % (ref 12.3–15.4)
ERYTHROCYTE [DISTWIDTH] IN BLOOD BY AUTOMATED COUNT: 14.7 % (ref 12.3–15.4)
ERYTHROCYTE [DISTWIDTH] IN BLOOD BY AUTOMATED COUNT: 14.8 % (ref 12.3–15.4)
ERYTHROCYTE [DISTWIDTH] IN BLOOD BY AUTOMATED COUNT: 14.8 % (ref 12.3–15.4)
ERYTHROCYTE [DISTWIDTH] IN BLOOD BY AUTOMATED COUNT: 15.1 % (ref 12.3–15.4)
ERYTHROCYTE [DISTWIDTH] IN BLOOD BY AUTOMATED COUNT: 15.4 % (ref 12.3–15.4)
ERYTHROCYTE [DISTWIDTH] IN BLOOD BY AUTOMATED COUNT: 15.6 % (ref 12.3–15.4)
ERYTHROCYTE [DISTWIDTH] IN BLOOD BY AUTOMATED COUNT: 15.7 % (ref 12.3–15.4)
ERYTHROCYTE [DISTWIDTH] IN BLOOD BY AUTOMATED COUNT: 15.7 % (ref 12.3–15.4)
FERRITIN SERPL-MCNC: 2465 NG/ML (ref 30–400)
FERRITIN SERPL-MCNC: 2699 NG/ML (ref 30–400)
FHHB: 11.8 % (ref 0–5)
FHHB: 19.1 % (ref 0–5)
FHHB: 5.4 % (ref 0–5)
FHHB: 7.8 % (ref 0–5)
GAS FLOW AIRWAY: ABNORMAL L/MIN
GFR SERPL CREATININE-BSD FRML MDRD: 11 ML/MIN/1.73
GFR SERPL CREATININE-BSD FRML MDRD: 12 ML/MIN/1.73
GFR SERPL CREATININE-BSD FRML MDRD: 13 ML/MIN/1.73
GFR SERPL CREATININE-BSD FRML MDRD: 16 ML/MIN/1.73
GFR SERPL CREATININE-BSD FRML MDRD: 21 ML/MIN/1.73
GFR SERPL CREATININE-BSD FRML MDRD: 21 ML/MIN/1.73
GFR SERPL CREATININE-BSD FRML MDRD: 24 ML/MIN/1.73
GFR SERPL CREATININE-BSD FRML MDRD: 27 ML/MIN/1.73
GFR SERPL CREATININE-BSD FRML MDRD: 28 ML/MIN/1.73
GFR SERPL CREATININE-BSD FRML MDRD: 30 ML/MIN/1.73
GFR SERPL CREATININE-BSD FRML MDRD: 4 ML/MIN/1.73
GFR SERPL CREATININE-BSD FRML MDRD: 6 ML/MIN/1.73
GFR SERPL CREATININE-BSD FRML MDRD: 7 ML/MIN/1.73
GFR SERPL CREATININE-BSD FRML MDRD: 9 ML/MIN/1.73
GFR SERPL CREATININE-BSD FRML MDRD: ABNORMAL ML/MIN/{1.73_M2}
GFR SERPLBLD BASED ON 1.73 SQ M-ARVRAT: 42 ML/MIN/{1.73_M2}
GLOBULIN UR ELPH-MCNC: 2.1 GM/DL
GLOBULIN UR ELPH-MCNC: 3.1 GM/DL
GLOBULIN UR ELPH-MCNC: 3.1 GM/DL
GLOBULIN UR ELPH-MCNC: 3.2 G/DL (ref 2–3.5)
GLOBULIN UR ELPH-MCNC: 3.4 GM/DL
GLOBULIN UR ELPH-MCNC: 3.6 GM/DL
GLOBULIN UR ELPH-MCNC: 3.7 GM/DL
GLOBULIN UR ELPH-MCNC: 4.2 GM/DL
GLUCOSE BLDA-MCNC: 135 MMOL/L (ref 70–99)
GLUCOSE BLDA-MCNC: 340 MMOL/L (ref 70–99)
GLUCOSE BLDC GLUCOMTR-MCNC: 107 MG/DL (ref 70–99)
GLUCOSE BLDC GLUCOMTR-MCNC: 110 MG/DL (ref 70–99)
GLUCOSE BLDC GLUCOMTR-MCNC: 110 MG/DL (ref 70–99)
GLUCOSE BLDC GLUCOMTR-MCNC: 113 MG/DL (ref 70–99)
GLUCOSE BLDC GLUCOMTR-MCNC: 119 MG/DL (ref 70–99)
GLUCOSE BLDC GLUCOMTR-MCNC: 119 MG/DL (ref 70–99)
GLUCOSE BLDC GLUCOMTR-MCNC: 123 MG/DL (ref 70–99)
GLUCOSE BLDC GLUCOMTR-MCNC: 123 MG/DL (ref 70–99)
GLUCOSE BLDC GLUCOMTR-MCNC: 126 MG/DL (ref 70–99)
GLUCOSE BLDC GLUCOMTR-MCNC: 127 MG/DL (ref 70–99)
GLUCOSE BLDC GLUCOMTR-MCNC: 129 MG/DL (ref 70–99)
GLUCOSE BLDC GLUCOMTR-MCNC: 130 MG/DL (ref 70–99)
GLUCOSE BLDC GLUCOMTR-MCNC: 131 MG/DL (ref 70–99)
GLUCOSE BLDC GLUCOMTR-MCNC: 132 MG/DL (ref 70–99)
GLUCOSE BLDC GLUCOMTR-MCNC: 133 MG/DL (ref 70–99)
GLUCOSE BLDC GLUCOMTR-MCNC: 134 MG/DL (ref 70–99)
GLUCOSE BLDC GLUCOMTR-MCNC: 136 MG/DL (ref 70–99)
GLUCOSE BLDC GLUCOMTR-MCNC: 138 MG/DL (ref 70–99)
GLUCOSE BLDC GLUCOMTR-MCNC: 140 MG/DL (ref 70–99)
GLUCOSE BLDC GLUCOMTR-MCNC: 144 MG/DL (ref 70–99)
GLUCOSE BLDC GLUCOMTR-MCNC: 144 MG/DL (ref 70–99)
GLUCOSE BLDC GLUCOMTR-MCNC: 145 MG/DL (ref 70–99)
GLUCOSE BLDC GLUCOMTR-MCNC: 147 MG/DL (ref 70–99)
GLUCOSE BLDC GLUCOMTR-MCNC: 150 MG/DL (ref 70–99)
GLUCOSE BLDC GLUCOMTR-MCNC: 151 MG/DL (ref 70–99)
GLUCOSE BLDC GLUCOMTR-MCNC: 156 MG/DL (ref 70–99)
GLUCOSE BLDC GLUCOMTR-MCNC: 159 MG/DL (ref 70–99)
GLUCOSE BLDC GLUCOMTR-MCNC: 161 MG/DL (ref 70–99)
GLUCOSE BLDC GLUCOMTR-MCNC: 165 MG/DL (ref 70–99)
GLUCOSE BLDC GLUCOMTR-MCNC: 168 MG/DL (ref 70–99)
GLUCOSE BLDC GLUCOMTR-MCNC: 171 MG/DL (ref 70–99)
GLUCOSE BLDC GLUCOMTR-MCNC: 185 MG/DL (ref 70–99)
GLUCOSE BLDC GLUCOMTR-MCNC: 190 MG/DL (ref 70–99)
GLUCOSE BLDC GLUCOMTR-MCNC: 210 MG/DL (ref 70–99)
GLUCOSE BLDC GLUCOMTR-MCNC: 241 MG/DL (ref 70–99)
GLUCOSE BLDC GLUCOMTR-MCNC: 277 MG/DL (ref 70–99)
GLUCOSE BLDC GLUCOMTR-MCNC: 281 MG/DL (ref 70–99)
GLUCOSE BLDC GLUCOMTR-MCNC: 327 MG/DL (ref 70–99)
GLUCOSE BLDC GLUCOMTR-MCNC: 329 MG/DL (ref 70–99)
GLUCOSE BLDC GLUCOMTR-MCNC: 331 MG/DL (ref 70–99)
GLUCOSE BLDC GLUCOMTR-MCNC: 335 MG/DL (ref 70–99)
GLUCOSE BLDC GLUCOMTR-MCNC: 343 MG/DL (ref 70–99)
GLUCOSE BLDC GLUCOMTR-MCNC: 345 MG/DL (ref 70–99)
GLUCOSE BLDC GLUCOMTR-MCNC: 362 MG/DL (ref 70–99)
GLUCOSE BLDC GLUCOMTR-MCNC: 367 MG/DL (ref 70–99)
GLUCOSE BLDC GLUCOMTR-MCNC: 376 MG/DL (ref 70–99)
GLUCOSE BLDC GLUCOMTR-MCNC: 418 MG/DL (ref 70–99)
GLUCOSE BLDC GLUCOMTR-MCNC: 448 MG/DL (ref 70–99)
GLUCOSE BLDC GLUCOMTR-MCNC: 462 MG/DL (ref 70–99)
GLUCOSE BLDC GLUCOMTR-MCNC: 97 MG/DL (ref 70–99)
GLUCOSE BLDC GLUCOMTR-MCNC: 98 MG/DL (ref 70–99)
GLUCOSE SERPL-MCNC: 112 MG/DL (ref 70–99)
GLUCOSE SERPL-MCNC: 129 MG/DL (ref 65–99)
GLUCOSE SERPL-MCNC: 145 MG/DL (ref 65–99)
GLUCOSE SERPL-MCNC: 153 MG/DL (ref 65–99)
GLUCOSE SERPL-MCNC: 158 MG/DL (ref 65–99)
GLUCOSE SERPL-MCNC: 198 MG/DL (ref 65–99)
GLUCOSE SERPL-MCNC: 226 MG/DL (ref 65–99)
GLUCOSE SERPL-MCNC: 240 MG/DL (ref 65–99)
GLUCOSE SERPL-MCNC: 272 MG/DL (ref 65–99)
GLUCOSE SERPL-MCNC: 282 MG/DL (ref 65–99)
GLUCOSE SERPL-MCNC: 286 MG/DL (ref 65–99)
GLUCOSE SERPL-MCNC: 398 MG/DL (ref 65–99)
GLUCOSE SERPL-MCNC: 414 MG/DL (ref 65–99)
GLUCOSE SERPL-MCNC: 426 MG/DL (ref 65–99)
GLUCOSE SERPL-MCNC: 440 MG/DL (ref 65–99)
GLUCOSE UR STRIP-MCNC: NEGATIVE MG/DL
GLUCOSE UR STRIP-MCNC: NEGATIVE MG/DL
HBV CORE IGM SERPL QL IA: NORMAL
HBV SURFACE AB SER RIA-ACNC: NORMAL
HBV SURFACE AG SERPL QL IA: NORMAL
HCO3 BLDA-SCNC: 16.7 MMOL/L (ref 22–26)
HCO3 BLDA-SCNC: 21.8 MMOL/L (ref 22–26)
HCO3 BLDA-SCNC: 22.3 MMOL/L (ref 22–26)
HCO3 BLDA-SCNC: 24 MMOL/L (ref 22–26)
HCT VFR BLD AUTO: 35.5 % (ref 37.5–51)
HCT VFR BLD AUTO: 35.8 % (ref 37.5–51)
HCT VFR BLD AUTO: 36.1 % (ref 37.5–51)
HCT VFR BLD AUTO: 36.5 % (ref 37.5–51)
HCT VFR BLD AUTO: 36.7 % (ref 37.5–51)
HCT VFR BLD AUTO: 36.9 % (ref 37.5–51)
HCT VFR BLD AUTO: 38.9 % (ref 37.5–51)
HCT VFR BLD AUTO: 39.8 % (ref 37.5–51)
HCT VFR BLD AUTO: 41.2 % (ref 37.5–51)
HCT VFR BLD AUTO: 44 % (ref 42–52)
HCT VFR BLD AUTO: 44.9 % (ref 37.5–51)
HCT VFR BLD AUTO: 48.2 % (ref 37.5–51)
HDLC SERPL-MCNC: 33 MG/DL (ref 40–60)
HGB BLD-MCNC: 11.9 G/DL (ref 13–17.7)
HGB BLD-MCNC: 12 G/DL (ref 13–17.7)
HGB BLD-MCNC: 12.2 G/DL (ref 13–17.7)
HGB BLD-MCNC: 12.3 G/DL (ref 13–17.7)
HGB BLD-MCNC: 13.1 G/DL (ref 13–17.7)
HGB BLD-MCNC: 13.5 G/DL (ref 13–17.7)
HGB BLD-MCNC: 13.8 G/DL (ref 13–17.7)
HGB BLD-MCNC: 14.1 G/DL (ref 14–18)
HGB BLD-MCNC: 14.7 G/DL (ref 13–17.7)
HGB BLD-MCNC: 15.6 G/DL (ref 13–17.7)
HGB BLDA-MCNC: 13.9 G/DL (ref 13.8–16.4)
HGB BLDA-MCNC: 14 G/DL (ref 13.8–16.4)
HGB BLDA-MCNC: 14.9 G/DL (ref 13.8–16.4)
HGB BLDA-MCNC: 16.4 G/DL (ref 13.8–16.4)
HGB UR QL STRIP.AUTO: ABNORMAL
HGB UR QL STRIP.AUTO: ABNORMAL
HOLD SPECIMEN: NORMAL
HOLD SPECIMEN: NORMAL
HYALINE CASTS UR QL AUTO: ABNORMAL /LPF
HYALINE CASTS UR QL AUTO: ABNORMAL /LPF
IMM GRANULOCYTES # BLD AUTO: 0.04 10*3/MM3 (ref 0–0.05)
IMM GRANULOCYTES # BLD AUTO: 0.04 10*3/MM3 (ref 0–0.05)
IMM GRANULOCYTES # BLD AUTO: 0.05 10*3/MM3 (ref 0–0.05)
IMM GRANULOCYTES # BLD AUTO: 0.08 10*3/MM3 (ref 0–0.05)
IMM GRANULOCYTES # BLD AUTO: 0.1 10*3/MM3 (ref 0–0.05)
IMM GRANULOCYTES # BLD AUTO: 0.13 10*3/MM3 (ref 0–0.05)
IMM GRANULOCYTES # BLD AUTO: 0.18 10*3/MM3 (ref 0–0.05)
IMM GRANULOCYTES # BLD AUTO: 0.34 10*3/MM3 (ref 0–0.05)
IMM GRANULOCYTES # BLD AUTO: 1.18 10*3/MM3 (ref 0–0.05)
IMM GRANULOCYTES # BLD AUTO: 1.81 10*3/MM3 (ref 0–0.05)
IMM GRANULOCYTES NFR BLD AUTO: 0.5 % (ref 0–0.5)
IMM GRANULOCYTES NFR BLD AUTO: 0.5 % (ref 0–0.5)
IMM GRANULOCYTES NFR BLD AUTO: 0.6 % (ref 0–0.5)
IMM GRANULOCYTES NFR BLD AUTO: 0.9 % (ref 0–0.5)
IMM GRANULOCYTES NFR BLD AUTO: 1.2 % (ref 0–0.5)
IMM GRANULOCYTES NFR BLD AUTO: 1.3 % (ref 0–0.5)
IMM GRANULOCYTES NFR BLD AUTO: 1.6 % (ref 0–0.5)
IMM GRANULOCYTES NFR BLD AUTO: 2.1 % (ref 0–0.5)
IMM GRANULOCYTES NFR BLD AUTO: 2.5 % (ref 0–0.5)
IMM GRANULOCYTES NFR BLD AUTO: 3.3 % (ref 0–0.5)
INHALED O2 CONCENTRATION: 100 %
KETONES UR QL STRIP: NEGATIVE
KETONES UR QL STRIP: NEGATIVE
L PNEUMO1 AG UR QL IA: NEGATIVE
LACTATE BLDA-SCNC: 2.95 MMOL/L (ref 0.5–2)
LACTATE BLDA-SCNC: 2.98 MMOL/L (ref 0.5–2)
LACTATE BLDA-SCNC: 3.05 MMOL/L (ref 0.5–2)
LACTATE BLDA-SCNC: ABNORMAL MMOL/L
LDH SERPL-CCNC: 552 U/L (ref 135–225)
LDH SERPL-CCNC: 587 U/L (ref 135–225)
LDH SERPL-CCNC: 723 U/L (ref 135–225)
LDLC SERPL CALC-MCNC: 111 MG/DL (ref 70–100)
LEUKOCYTE ESTERASE UR QL STRIP.AUTO: ABNORMAL
LEUKOCYTE ESTERASE UR QL STRIP.AUTO: NEGATIVE
LV EF 2D ECHO EST: 60 %
LYMPHOCYTES # BLD AUTO: 0.17 10*3/MM3 (ref 0.7–3.1)
LYMPHOCYTES # BLD AUTO: 0.23 10*3/MM3 (ref 0.7–3.1)
LYMPHOCYTES # BLD AUTO: 0.25 10*3/MM3 (ref 0.7–3.1)
LYMPHOCYTES # BLD AUTO: 0.28 10*3/MM3 (ref 0.7–3.1)
LYMPHOCYTES # BLD AUTO: 0.3 10*3/MM3 (ref 0.7–3.1)
LYMPHOCYTES # BLD AUTO: 0.34 10*3/MM3 (ref 0.7–3.1)
LYMPHOCYTES # BLD AUTO: 0.38 10*3/MM3 (ref 0.7–3.1)
LYMPHOCYTES # BLD AUTO: 0.41 10*3/MM3 (ref 0.7–3.1)
LYMPHOCYTES # BLD AUTO: 1.34 10*3/MM3 (ref 0.7–3.1)
LYMPHOCYTES # BLD AUTO: 1.42 10*3/UL (ref 1–5)
LYMPHOCYTES # BLD AUTO: 1.59 10*3/MM3 (ref 0.7–3.1)
LYMPHOCYTES NFR BLD AUTO: 1.2 % (ref 19.6–45.3)
LYMPHOCYTES NFR BLD AUTO: 1.8 % (ref 19.6–45.3)
LYMPHOCYTES NFR BLD AUTO: 17.6 % (ref 20–45)
LYMPHOCYTES NFR BLD AUTO: 2.6 % (ref 19.6–45.3)
LYMPHOCYTES NFR BLD AUTO: 2.8 % (ref 19.6–45.3)
LYMPHOCYTES NFR BLD AUTO: 2.9 % (ref 19.6–45.3)
LYMPHOCYTES NFR BLD AUTO: 3.2 % (ref 19.6–45.3)
LYMPHOCYTES NFR BLD AUTO: 3.3 % (ref 19.6–45.3)
LYMPHOCYTES NFR BLD AUTO: 3.6 % (ref 19.6–45.3)
LYMPHOCYTES NFR BLD AUTO: 4.4 % (ref 19.6–45.3)
LYMPHOCYTES NFR BLD AUTO: 5.6 % (ref 19.6–45.3)
MAGNESIUM SERPL-MCNC: 1.7 MG/DL (ref 1.6–2.4)
MAGNESIUM SERPL-MCNC: 1.9 MG/DL (ref 1.6–2.4)
MAGNESIUM SERPL-MCNC: 2 MG/DL (ref 1.6–2.4)
MAGNESIUM SERPL-MCNC: 2 MG/DL (ref 1.6–2.4)
MAGNESIUM SERPL-MCNC: 2.3 MG/DL (ref 1.6–2.4)
MAGNESIUM SERPL-MCNC: 2.5 MG/DL (ref 1.6–2.4)
MAGNESIUM SERPL-MCNC: 2.7 MG/DL (ref 1.6–2.4)
MAGNESIUM SERPL-MCNC: 2.8 MG/DL (ref 1.6–2.4)
MAXIMAL PREDICTED HEART RATE: 158 BPM
MAXIMAL PREDICTED HEART RATE: 158 BPM
MCH RBC QN AUTO: 28.5 PG (ref 26.6–33)
MCH RBC QN AUTO: 28.5 PG (ref 27–31)
MCH RBC QN AUTO: 28.7 PG (ref 26.6–33)
MCH RBC QN AUTO: 28.7 PG (ref 26.6–33)
MCH RBC QN AUTO: 28.9 PG (ref 26.6–33)
MCH RBC QN AUTO: 29 PG (ref 26.6–33)
MCH RBC QN AUTO: 29.1 PG (ref 26.6–33)
MCH RBC QN AUTO: 29.1 PG (ref 26.6–33)
MCH RBC QN AUTO: 29.3 PG (ref 26.6–33)
MCH RBC QN AUTO: 30.2 PG (ref 26.6–33)
MCHC RBC AUTO-ENTMCNC: 32 G/DL (ref 33–37)
MCHC RBC AUTO-ENTMCNC: 32.4 G/DL (ref 31.5–35.7)
MCHC RBC AUTO-ENTMCNC: 32.5 G/DL (ref 31.5–35.7)
MCHC RBC AUTO-ENTMCNC: 32.7 G/DL (ref 31.5–35.7)
MCHC RBC AUTO-ENTMCNC: 32.9 G/DL (ref 31.5–35.7)
MCHC RBC AUTO-ENTMCNC: 33 G/DL (ref 31.5–35.7)
MCHC RBC AUTO-ENTMCNC: 33.4 G/DL (ref 31.5–35.7)
MCHC RBC AUTO-ENTMCNC: 33.5 G/DL (ref 31.5–35.7)
MCHC RBC AUTO-ENTMCNC: 33.5 G/DL (ref 31.5–35.7)
MCHC RBC AUTO-ENTMCNC: 34.1 G/DL (ref 31.5–35.7)
MCHC RBC AUTO-ENTMCNC: 34.4 G/DL (ref 31.5–35.7)
MCHC RBC AUTO-ENTMCNC: 34.7 G/DL (ref 31.5–35.7)
MCV RBC AUTO: 83.3 FL (ref 79–97)
MCV RBC AUTO: 85.1 FL (ref 79–97)
MCV RBC AUTO: 85.4 FL (ref 79–97)
MCV RBC AUTO: 86.4 FL (ref 79–97)
MCV RBC AUTO: 86.5 FL (ref 79–97)
MCV RBC AUTO: 86.5 FL (ref 79–97)
MCV RBC AUTO: 86.8 FL (ref 79–97)
MCV RBC AUTO: 87 FL (ref 79–97)
MCV RBC AUTO: 87.5 FL (ref 79–97)
MCV RBC AUTO: 89.1 FL (ref 80–96)
MCV RBC AUTO: 89.6 FL (ref 79–97)
MCV RBC AUTO: 92.7 FL (ref 79–97)
METHGB BLD QL: 0 % (ref 0–1.5)
METHGB BLD QL: 0 % (ref 0–1.5)
METHGB BLD QL: 0.1 % (ref 0–1.5)
METHGB BLD QL: 0.1 % (ref 0–1.5)
MODALITY: ABNORMAL
MONOCYTES # BLD AUTO: 0.19 10*3/MM3 (ref 0.1–0.9)
MONOCYTES # BLD AUTO: 0.22 10*3/MM3 (ref 0.1–0.9)
MONOCYTES # BLD AUTO: 0.23 10*3/MM3 (ref 0.1–0.9)
MONOCYTES # BLD AUTO: 0.25 10*3/MM3 (ref 0.1–0.9)
MONOCYTES # BLD AUTO: 0.29 10*3/MM3 (ref 0.1–0.9)
MONOCYTES # BLD AUTO: 0.31 10*3/MM3 (ref 0.1–0.9)
MONOCYTES # BLD AUTO: 0.35 10*3/MM3 (ref 0.1–0.9)
MONOCYTES # BLD AUTO: 0.36 10*3/MM3 (ref 0.1–0.9)
MONOCYTES # BLD AUTO: 0.56 10*3/UL (ref 0.2–1.2)
MONOCYTES # BLD AUTO: 0.7 10*3/MM3 (ref 0.1–0.9)
MONOCYTES # BLD AUTO: 0.86 10*3/MM3 (ref 0.1–0.9)
MONOCYTES NFR BLD AUTO: 0.4 % (ref 5–12)
MONOCYTES NFR BLD AUTO: 1.8 % (ref 5–12)
MONOCYTES NFR BLD AUTO: 2.6 % (ref 5–12)
MONOCYTES NFR BLD AUTO: 2.6 % (ref 5–12)
MONOCYTES NFR BLD AUTO: 2.9 % (ref 5–12)
MONOCYTES NFR BLD AUTO: 3 % (ref 5–12)
MONOCYTES NFR BLD AUTO: 3.3 % (ref 5–12)
MONOCYTES NFR BLD AUTO: 3.6 % (ref 5–12)
MONOCYTES NFR BLD AUTO: 3.7 % (ref 5–12)
MONOCYTES NFR BLD AUTO: 4.2 % (ref 5–12)
MONOCYTES NFR BLD AUTO: 6.9 % (ref 3–10)
MRSA DNA SPEC QL NAA+PROBE: NORMAL
NEUTROPHILS # BLD AUTO: 5.95 10*3/UL (ref 2–8)
NEUTROPHILS NFR BLD AUTO: 10.76 10*3/MM3 (ref 1.7–7)
NEUTROPHILS NFR BLD AUTO: 25.94 10*3/MM3 (ref 1.7–7)
NEUTROPHILS NFR BLD AUTO: 43.79 10*3/MM3 (ref 1.7–7)
NEUTROPHILS NFR BLD AUTO: 5.74 10*3/MM3 (ref 1.7–7)
NEUTROPHILS NFR BLD AUTO: 51.25 10*3/MM3 (ref 1.7–7)
NEUTROPHILS NFR BLD AUTO: 6.73 10*3/MM3 (ref 1.7–7)
NEUTROPHILS NFR BLD AUTO: 7.21 10*3/MM3 (ref 1.7–7)
NEUTROPHILS NFR BLD AUTO: 7.82 10*3/MM3 (ref 1.7–7)
NEUTROPHILS NFR BLD AUTO: 7.95 10*3/MM3 (ref 1.7–7)
NEUTROPHILS NFR BLD AUTO: 73.8 % (ref 30–85)
NEUTROPHILS NFR BLD AUTO: 8.73 10*3/MM3 (ref 1.7–7)
NEUTROPHILS NFR BLD AUTO: 90.7 % (ref 42.7–76)
NEUTROPHILS NFR BLD AUTO: 90.9 % (ref 42.7–76)
NEUTROPHILS NFR BLD AUTO: 91.2 % (ref 42.7–76)
NEUTROPHILS NFR BLD AUTO: 91.7 % (ref 42.7–76)
NEUTROPHILS NFR BLD AUTO: 92.6 % (ref 42.7–76)
NEUTROPHILS NFR BLD AUTO: 92.7 % (ref 42.7–76)
NEUTROPHILS NFR BLD AUTO: 93.2 % (ref 42.7–76)
NEUTROPHILS NFR BLD AUTO: 93.3 % (ref 42.7–76)
NEUTROPHILS NFR BLD AUTO: 93.9 % (ref 42.7–76)
NEUTROPHILS NFR BLD AUTO: 94.9 % (ref 42.7–76)
NITRITE UR QL STRIP: NEGATIVE
NITRITE UR QL STRIP: POSITIVE
NOTE: ABNORMAL
NOTE: ABNORMAL
NRBC BLD AUTO-RTO: 0 /100 WBC (ref 0–0.2)
NRBC BLD AUTO-RTO: 0.1 /100 WBC (ref 0–0.2)
NRBC BLD AUTO-RTO: 0.1 /100 WBC (ref 0–0.2)
NRBC CBCN: 0 % (ref 0–0.7)
NT-PROBNP SERPL-MCNC: 1884 PG/ML (ref 0–900)
NT-PROBNP SERPL-MCNC: 233.1 PG/ML (ref 0–900)
NT-PROBNP SERPL-MCNC: 298.3 PG/ML (ref 0–900)
OSMOLALITY SERPL CALC.SUM OF ELEC: 303 MOSM/KG (ref 273–304)
OXYHGB MFR BLDV: 80.6 % (ref 94–99)
OXYHGB MFR BLDV: 87.8 % (ref 94–99)
OXYHGB MFR BLDV: 91.8 % (ref 94–99)
OXYHGB MFR BLDV: 94.3 % (ref 94–99)
PCO2 BLDA: 31.3 MM HG (ref 35–45)
PCO2 BLDA: 38.2 MM HG (ref 35–45)
PCO2 BLDA: 38.8 MM HG (ref 35–45)
PCO2 BLDA: 40.4 MM HG (ref 35–45)
PH BLDA: 7.34 PH UNITS (ref 7.35–7.45)
PH BLDA: 7.37 PH UNITS (ref 7.35–7.45)
PH BLDA: 7.38 PH UNITS (ref 7.35–7.45)
PH BLDA: 7.39 PH UNITS (ref 7.35–7.45)
PH UR STRIP.AUTO: 5.5 [PH] (ref 5–8)
PH UR STRIP.AUTO: 5.5 [PH] (ref 5–8)
PHOSPHATE SERPL-MCNC: 3.6 MG/DL (ref 2.5–4.5)
PHOSPHATE SERPL-MCNC: 4.3 MG/DL (ref 2.5–4.5)
PHOSPHATE SERPL-MCNC: 4.3 MG/DL (ref 2.5–4.5)
PHOSPHATE SERPL-MCNC: 4.8 MG/DL (ref 2.5–4.5)
PHOSPHATE SERPL-MCNC: 4.9 MG/DL (ref 2.5–4.5)
PHOSPHATE SERPL-MCNC: 6.3 MG/DL (ref 2.5–4.5)
PHOSPHATE SERPL-MCNC: 6.5 MG/DL (ref 2.5–4.5)
PHOSPHATE SERPL-MCNC: 7.4 MG/DL (ref 2.5–4.5)
PHOSPHATE SERPL-MCNC: 7.9 MG/DL (ref 2.5–4.5)
PLATELET # BLD AUTO: 111 10*3/MM3 (ref 140–450)
PLATELET # BLD AUTO: 130 10*3/MM3 (ref 140–450)
PLATELET # BLD AUTO: 143 10*3/MM3 (ref 140–450)
PLATELET # BLD AUTO: 182 10*3/MM3 (ref 140–450)
PLATELET # BLD AUTO: 249 10*3/MM3 (ref 140–450)
PLATELET # BLD AUTO: 257 10*3/MM3 (ref 140–450)
PLATELET # BLD AUTO: 280 10*3/MM3 (ref 140–450)
PLATELET # BLD AUTO: 287 10*3/MM3 (ref 140–450)
PLATELET # BLD AUTO: 290 10*3/MM3 (ref 140–450)
PLATELET # BLD AUTO: 302 10*3/UL (ref 130–400)
PLATELET # BLD AUTO: 305 10*3/MM3 (ref 140–450)
PLATELET # BLD AUTO: 318 10*3/MM3 (ref 140–450)
PMV BLD AUTO: 10.5 FL (ref 9.4–12.4)
PMV BLD AUTO: 11.4 FL (ref 6–12)
PMV BLD AUTO: 11.7 FL (ref 6–12)
PMV BLD AUTO: 12.4 FL (ref 6–12)
PMV BLD AUTO: 13.5 FL (ref 6–12)
PMV BLD AUTO: 9.4 FL (ref 6–12)
PMV BLD AUTO: 9.6 FL (ref 6–12)
PMV BLD AUTO: 9.7 FL (ref 6–12)
PMV BLD AUTO: 9.7 FL (ref 6–12)
PMV BLD AUTO: 9.8 FL (ref 6–12)
PO2 BLD: 47 MM[HG] (ref 0–500)
PO2 BLD: 62 MM[HG] (ref 0–500)
PO2 BLD: 72 MM[HG] (ref 0–500)
PO2 BLD: 85 MM[HG] (ref 0–500)
PO2 BLDA: 47.2 MM HG (ref 80–100)
PO2 BLDA: 61.8 MM HG (ref 80–100)
PO2 BLDA: 72.4 MM HG (ref 80–100)
PO2 BLDA: 84.8 MM HG (ref 80–100)
POTASSIUM BLDA-SCNC: 4.11 MMOL/L (ref 3.5–5)
POTASSIUM BLDA-SCNC: 4.8 MMOL/L (ref 3.5–5)
POTASSIUM SERPL-SCNC: 3.9 MMOL/L (ref 3.5–5.3)
POTASSIUM SERPL-SCNC: 4.1 MMOL/L (ref 3.5–5.2)
POTASSIUM SERPL-SCNC: 4.1 MMOL/L (ref 3.5–5.2)
POTASSIUM SERPL-SCNC: 4.2 MMOL/L (ref 3.5–5.2)
POTASSIUM SERPL-SCNC: 4.2 MMOL/L (ref 3.5–5.2)
POTASSIUM SERPL-SCNC: 4.4 MMOL/L (ref 3.5–5.2)
POTASSIUM SERPL-SCNC: 4.4 MMOL/L (ref 3.5–5.2)
POTASSIUM SERPL-SCNC: 4.5 MMOL/L (ref 3.5–5.2)
POTASSIUM SERPL-SCNC: 4.6 MMOL/L (ref 3.5–5.2)
POTASSIUM SERPL-SCNC: 4.6 MMOL/L (ref 3.5–5.2)
POTASSIUM SERPL-SCNC: 4.7 MMOL/L (ref 3.5–5.2)
POTASSIUM SERPL-SCNC: 4.9 MMOL/L (ref 3.5–5.2)
POTASSIUM SERPL-SCNC: 5 MMOL/L (ref 3.5–5.2)
POTASSIUM SERPL-SCNC: 5.1 MMOL/L (ref 3.5–5.2)
POTASSIUM SERPL-SCNC: 5.8 MMOL/L (ref 3.5–5.2)
POTASSIUM UR-SCNC: 10 MMOL/L
PROCALCITONIN SERPL-MCNC: 0.43 NG/ML (ref 0–0.25)
PROCALCITONIN SERPL-MCNC: 2.09 NG/ML (ref 0–0.25)
PROCALCITONIN SERPL-MCNC: 9.03 NG/ML (ref 0–0.25)
PROT SERPL-MCNC: 4.4 G/DL (ref 6–8.5)
PROT SERPL-MCNC: 5.9 G/DL (ref 6–8.5)
PROT SERPL-MCNC: 6.3 G/DL (ref 6–8.5)
PROT SERPL-MCNC: 6.6 G/DL (ref 6–8.5)
PROT SERPL-MCNC: 6.6 G/DL (ref 6–8.5)
PROT SERPL-MCNC: 7 G/DL (ref 6–8.5)
PROT SERPL-MCNC: 7.9 G/DL (ref 6–8.5)
PROT UR QL STRIP: ABNORMAL
PROT UR QL STRIP: ABNORMAL
QT INTERVAL: 414 MS
RBC # BLD AUTO: 3.98 10*6/MM3 (ref 4.14–5.8)
RBC # BLD AUTO: 4.15 10*6/MM3 (ref 4.14–5.8)
RBC # BLD AUTO: 4.17 10*6/MM3 (ref 4.14–5.8)
RBC # BLD AUTO: 4.19 10*6/MM3 (ref 4.14–5.8)
RBC # BLD AUTO: 4.22 10*6/MM3 (ref 4.14–5.8)
RBC # BLD AUTO: 4.23 10*6/MM3 (ref 4.14–5.8)
RBC # BLD AUTO: 4.6 10*6/MM3 (ref 4.14–5.8)
RBC # BLD AUTO: 4.67 10*6/MM3 (ref 4.14–5.8)
RBC # BLD AUTO: 4.77 10*6/MM3 (ref 4.14–5.8)
RBC # BLD AUTO: 4.94 10*6/UL (ref 4.7–6.1)
RBC # BLD AUTO: 5.13 10*6/MM3 (ref 4.14–5.8)
RBC # BLD AUTO: 5.38 10*6/MM3 (ref 4.14–5.8)
RBC # UR: ABNORMAL /HPF
RBC # UR: ABNORMAL /HPF
RBC MORPH BLD: NORMAL
RBC MORPH BLD: NORMAL
REF LAB TEST METHOD: ABNORMAL
REF LAB TEST METHOD: ABNORMAL
RENAL EPI CELLS #/AREA URNS HPF: ABNORMAL /HPF
S PNEUM AG SPEC QL LA: NEGATIVE
SAO2 % BLDCOA: 80.8 % (ref 95–99)
SAO2 % BLDCOA: 88.2 % (ref 95–99)
SAO2 % BLDCOA: 92.2 % (ref 95–99)
SAO2 % BLDCOA: 94.6 % (ref 95–99)
SMALL PLATELETS BLD QL SMEAR: ADEQUATE
SMALL PLATELETS BLD QL SMEAR: NORMAL
SODIUM BLDA-SCNC: 151.5 MMOL/L (ref 136–146)
SODIUM SERPL-SCNC: 131 MMOL/L (ref 136–145)
SODIUM SERPL-SCNC: 132 MMOL/L (ref 136–145)
SODIUM SERPL-SCNC: 133 MMOL/L (ref 136–145)
SODIUM SERPL-SCNC: 136 MMOL/L (ref 136–145)
SODIUM SERPL-SCNC: 138 MMOL/L (ref 136–145)
SODIUM SERPL-SCNC: 138 MMOL/L (ref 136–145)
SODIUM SERPL-SCNC: 140 MMOL/L (ref 136–145)
SODIUM SERPL-SCNC: 140 MMOL/L (ref 136–145)
SODIUM SERPL-SCNC: 141 MMOL/L (ref 136–145)
SODIUM SERPL-SCNC: 141 MMOL/L (ref 136–145)
SODIUM SERPL-SCNC: 143 MMOL/L (ref 135–147)
SODIUM SERPL-SCNC: 145 MMOL/L (ref 136–145)
SODIUM SERPL-SCNC: 146 MMOL/L (ref 136–145)
SODIUM SERPL-SCNC: 149 MMOL/L (ref 136–145)
SODIUM SERPL-SCNC: 149 MMOL/L (ref 136–145)
SODIUM UR-SCNC: 53 MMOL/L
SP GR UR STRIP: 1.02 (ref 1–1.03)
SP GR UR STRIP: 1.02 (ref 1–1.03)
SQUAMOUS #/AREA URNS HPF: ABNORMAL /HPF
SQUAMOUS #/AREA URNS HPF: ABNORMAL /HPF
STRESS TARGET HR: 134 BPM
STRESS TARGET HR: 134 BPM
TRIGL SERPL-MCNC: 314 MG/DL (ref 40–150)
TROPONIN T SERPL-MCNC: 0.03 NG/ML (ref 0–0.03)
TSH SERPL-ACNC: 2.05 M[IU]/L (ref 0.27–4.2)
URATE SERPL-MCNC: 7.4 MG/DL (ref 3.5–8.5)
UROBILINOGEN UR QL STRIP: ABNORMAL
UROBILINOGEN UR QL STRIP: ABNORMAL
VLDLC SERPL-MCNC: 63 MG/DL (ref 5–37)
WBC # BLD AUTO: 11.6 10*3/MM3 (ref 3.4–10.8)
WBC # BLD AUTO: 13.8 10*3/MM3 (ref 3.4–10.8)
WBC # BLD AUTO: 27.35 10*3/MM3 (ref 3.4–10.8)
WBC # BLD AUTO: 47.3 10*3/MM3 (ref 3.4–10.8)
WBC # BLD AUTO: 54.97 10*3/MM3 (ref 3.4–10.8)
WBC # BLD AUTO: 6.33 10*3/MM3 (ref 3.4–10.8)
WBC # BLD AUTO: 7.38 10*3/MM3 (ref 3.4–10.8)
WBC # BLD AUTO: 7.86 10*3/MM3 (ref 3.4–10.8)
WBC # BLD AUTO: 8.07 10*3/UL (ref 4.8–10.8)
WBC # BLD AUTO: 8.53 10*3/MM3 (ref 3.4–10.8)
WBC # BLD AUTO: 8.6 10*3/MM3 (ref 3.4–10.8)
WBC # BLD AUTO: 9.3 10*3/MM3 (ref 3.4–10.8)
WBC MORPH BLD: NORMAL
WBC MORPH BLD: NORMAL
WBC UR QL AUTO: ABNORMAL /HPF
WBC UR QL AUTO: ABNORMAL /HPF
WHOLE BLOOD HOLD SPECIMEN: NORMAL
WHOLE BLOOD HOLD SPECIMEN: NORMAL

## 2021-01-01 PROCEDURE — 76937 US GUIDE VASCULAR ACCESS: CPT | Performed by: INTERNAL MEDICINE

## 2021-01-01 PROCEDURE — 94799 UNLISTED PULMONARY SVC/PX: CPT

## 2021-01-01 PROCEDURE — 85027 COMPLETE CBC AUTOMATED: CPT | Performed by: INTERNAL MEDICINE

## 2021-01-01 PROCEDURE — 25010000002 ENOXAPARIN PER 10 MG: Performed by: INTERNAL MEDICINE

## 2021-01-01 PROCEDURE — 25010000002 FUROSEMIDE PER 20 MG: Performed by: PHYSICIAN ASSISTANT

## 2021-01-01 PROCEDURE — 25010000002 LORAZEPAM PER 2 MG: Performed by: INTERNAL MEDICINE

## 2021-01-01 PROCEDURE — 25010000002 ONDANSETRON PER 1 MG: Performed by: NURSE ANESTHETIST, CERTIFIED REGISTERED

## 2021-01-01 PROCEDURE — 94660 CPAP INITIATION&MGMT: CPT

## 2021-01-01 PROCEDURE — 63710000001 INSULIN DETEMIR PER 5 UNITS: Performed by: PHYSICIAN ASSISTANT

## 2021-01-01 PROCEDURE — 25010000002 LEVOFLOXACIN PER 250 MG: Performed by: UROLOGY

## 2021-01-01 PROCEDURE — 71045 X-RAY EXAM CHEST 1 VIEW: CPT

## 2021-01-01 PROCEDURE — 82728 ASSAY OF FERRITIN: CPT | Performed by: STUDENT IN AN ORGANIZED HEALTH CARE EDUCATION/TRAINING PROGRAM

## 2021-01-01 PROCEDURE — 82962 GLUCOSE BLOOD TEST: CPT

## 2021-01-01 PROCEDURE — 85025 COMPLETE CBC W/AUTO DIFF WBC: CPT | Performed by: UROLOGY

## 2021-01-01 PROCEDURE — 74018 RADEX ABDOMEN 1 VIEW: CPT

## 2021-01-01 PROCEDURE — 83615 LACTATE (LD) (LDH) ENZYME: CPT | Performed by: UROLOGY

## 2021-01-01 PROCEDURE — 82805 BLOOD GASES W/O2 SATURATION: CPT | Performed by: INTERNAL MEDICINE

## 2021-01-01 PROCEDURE — 5A1945Z RESPIRATORY VENTILATION, 24-96 CONSECUTIVE HOURS: ICD-10-PCS | Performed by: INTERNAL MEDICINE

## 2021-01-01 PROCEDURE — 25010000002 FUROSEMIDE PER 20 MG: Performed by: INTERNAL MEDICINE

## 2021-01-01 PROCEDURE — 99291 CRITICAL CARE FIRST HOUR: CPT | Performed by: INTERNAL MEDICINE

## 2021-01-01 PROCEDURE — 87086 URINE CULTURE/COLONY COUNT: CPT

## 2021-01-01 PROCEDURE — 83880 ASSAY OF NATRIURETIC PEPTIDE: CPT | Performed by: UROLOGY

## 2021-01-01 PROCEDURE — 83735 ASSAY OF MAGNESIUM: CPT | Performed by: UROLOGY

## 2021-01-01 PROCEDURE — 25010000002 EPINEPHRINE 1 MG/10ML SOLUTION PREFILLED SYRINGE: Performed by: INTERNAL MEDICINE

## 2021-01-01 PROCEDURE — 25010000002 ALBUMIN HUMAN 25% PER 50 ML: Performed by: INTERNAL MEDICINE

## 2021-01-01 PROCEDURE — 25010000002 DEXAMETHASONE PER 1 MG: Performed by: STUDENT IN AN ORGANIZED HEALTH CARE EDUCATION/TRAINING PROGRAM

## 2021-01-01 PROCEDURE — 25010000002 HALOPERIDOL LACTATE PER 5 MG: Performed by: INTERNAL MEDICINE

## 2021-01-01 PROCEDURE — 85730 THROMBOPLASTIN TIME PARTIAL: CPT | Performed by: UROLOGY

## 2021-01-01 PROCEDURE — 80053 COMPREHEN METABOLIC PANEL: CPT

## 2021-01-01 PROCEDURE — 99213 OFFICE O/P EST LOW 20 MIN: CPT | Performed by: NURSE PRACTITIONER

## 2021-01-01 PROCEDURE — 84145 PROCALCITONIN (PCT): CPT | Performed by: INTERNAL MEDICINE

## 2021-01-01 PROCEDURE — 5A12012 PERFORMANCE OF CARDIAC OUTPUT, SINGLE, MANUAL: ICD-10-PCS | Performed by: INTERNAL MEDICINE

## 2021-01-01 PROCEDURE — 63710000001 INSULIN LISPRO (HUMAN) PER 5 UNITS: Performed by: INTERNAL MEDICINE

## 2021-01-01 PROCEDURE — 84145 PROCALCITONIN (PCT): CPT | Performed by: UROLOGY

## 2021-01-01 PROCEDURE — 99024 POSTOP FOLLOW-UP VISIT: CPT | Performed by: UROLOGY

## 2021-01-01 PROCEDURE — 25010000002 HEPARIN (PORCINE) PER 1000 UNITS: Performed by: STUDENT IN AN ORGANIZED HEALTH CARE EDUCATION/TRAINING PROGRAM

## 2021-01-01 PROCEDURE — 80053 COMPREHEN METABOLIC PANEL: CPT | Performed by: NURSE PRACTITIONER

## 2021-01-01 PROCEDURE — 82375 ASSAY CARBOXYHB QUANT: CPT | Performed by: INTERNAL MEDICINE

## 2021-01-01 PROCEDURE — 84100 ASSAY OF PHOSPHORUS: CPT

## 2021-01-01 PROCEDURE — 87899 AGENT NOS ASSAY W/OPTIC: CPT | Performed by: STUDENT IN AN ORGANIZED HEALTH CARE EDUCATION/TRAINING PROGRAM

## 2021-01-01 PROCEDURE — 87641 MR-STAPH DNA AMP PROBE: CPT | Performed by: NURSE PRACTITIONER

## 2021-01-01 PROCEDURE — 83735 ASSAY OF MAGNESIUM: CPT | Performed by: INTERNAL MEDICINE

## 2021-01-01 PROCEDURE — 93970 EXTREMITY STUDY: CPT | Performed by: SURGERY

## 2021-01-01 PROCEDURE — 93308 TTE F-UP OR LMTD: CPT | Performed by: INTERNAL MEDICINE

## 2021-01-01 PROCEDURE — 86140 C-REACTIVE PROTEIN: CPT | Performed by: INTERNAL MEDICINE

## 2021-01-01 PROCEDURE — 86705 HEP B CORE ANTIBODY IGM: CPT | Performed by: INTERNAL MEDICINE

## 2021-01-01 PROCEDURE — 25010000002 DEXAMETHASONE PER 1 MG: Performed by: INTERNAL MEDICINE

## 2021-01-01 PROCEDURE — 94760 N-INVAS EAR/PLS OXIMETRY 1: CPT

## 2021-01-01 PROCEDURE — 94002 VENT MGMT INPAT INIT DAY: CPT

## 2021-01-01 PROCEDURE — 82570 ASSAY OF URINE CREATININE: CPT | Performed by: STUDENT IN AN ORGANIZED HEALTH CARE EDUCATION/TRAINING PROGRAM

## 2021-01-01 PROCEDURE — C1769 GUIDE WIRE: HCPCS | Performed by: UROLOGY

## 2021-01-01 PROCEDURE — 81001 URINALYSIS AUTO W/SCOPE: CPT

## 2021-01-01 PROCEDURE — C2617 STENT, NON-COR, TEM W/O DEL: HCPCS | Performed by: UROLOGY

## 2021-01-01 PROCEDURE — 31500 INSERT EMERGENCY AIRWAY: CPT | Performed by: INTERNAL MEDICINE

## 2021-01-01 PROCEDURE — 85025 COMPLETE CBC W/AUTO DIFF WBC: CPT

## 2021-01-01 PROCEDURE — 25010000002 FENTANYL CITRATE (PF) 2500 MCG/50ML SOLUTION: Performed by: INTERNAL MEDICINE

## 2021-01-01 PROCEDURE — 83615 LACTATE (LD) (LDH) ENZYME: CPT | Performed by: STUDENT IN AN ORGANIZED HEALTH CARE EDUCATION/TRAINING PROGRAM

## 2021-01-01 PROCEDURE — 80053 COMPREHEN METABOLIC PANEL: CPT | Performed by: EMERGENCY MEDICINE

## 2021-01-01 PROCEDURE — 86140 C-REACTIVE PROTEIN: CPT | Performed by: STUDENT IN AN ORGANIZED HEALTH CARE EDUCATION/TRAINING PROGRAM

## 2021-01-01 PROCEDURE — 25010000002 MORPHINE PER 10 MG: Performed by: INTERNAL MEDICINE

## 2021-01-01 PROCEDURE — 03HY32Z INSERTION OF MONITORING DEVICE INTO UPPER ARTERY, PERCUTANEOUS APPROACH: ICD-10-PCS | Performed by: INTERNAL MEDICINE

## 2021-01-01 PROCEDURE — 82728 ASSAY OF FERRITIN: CPT | Performed by: INTERNAL MEDICINE

## 2021-01-01 PROCEDURE — 83050 HGB METHEMOGLOBIN QUAN: CPT | Performed by: NURSE PRACTITIONER

## 2021-01-01 PROCEDURE — 25010000002 CEFEPIME PER 500 MG: Performed by: INTERNAL MEDICINE

## 2021-01-01 PROCEDURE — 83050 HGB METHEMOGLOBIN QUAN: CPT | Performed by: INTERNAL MEDICINE

## 2021-01-01 PROCEDURE — 74176 CT ABD & PELVIS W/O CONTRAST: CPT

## 2021-01-01 PROCEDURE — 87070 CULTURE OTHR SPECIMN AEROBIC: CPT

## 2021-01-01 PROCEDURE — 36556 INSERT NON-TUNNEL CV CATH: CPT | Performed by: INTERNAL MEDICINE

## 2021-01-01 PROCEDURE — 25010000002 CALCIUM GLUCONATE 2-0.675 GM/100ML-% SOLUTION: Performed by: INTERNAL MEDICINE

## 2021-01-01 PROCEDURE — 99223 1ST HOSP IP/OBS HIGH 75: CPT | Performed by: STUDENT IN AN ORGANIZED HEALTH CARE EDUCATION/TRAINING PROGRAM

## 2021-01-01 PROCEDURE — 83735 ASSAY OF MAGNESIUM: CPT

## 2021-01-01 PROCEDURE — 81001 URINALYSIS AUTO W/SCOPE: CPT | Performed by: STUDENT IN AN ORGANIZED HEALTH CARE EDUCATION/TRAINING PROGRAM

## 2021-01-01 PROCEDURE — 84133 ASSAY OF URINE POTASSIUM: CPT | Performed by: STUDENT IN AN ORGANIZED HEALTH CARE EDUCATION/TRAINING PROGRAM

## 2021-01-01 PROCEDURE — 82805 BLOOD GASES W/O2 SATURATION: CPT | Performed by: NURSE PRACTITIONER

## 2021-01-01 PROCEDURE — 82375 ASSAY CARBOXYHB QUANT: CPT | Performed by: NURSE PRACTITIONER

## 2021-01-01 PROCEDURE — 87340 HEPATITIS B SURFACE AG IA: CPT | Performed by: INTERNAL MEDICINE

## 2021-01-01 PROCEDURE — 84145 PROCALCITONIN (PCT): CPT | Performed by: STUDENT IN AN ORGANIZED HEALTH CARE EDUCATION/TRAINING PROGRAM

## 2021-01-01 PROCEDURE — 5A1D90Z PERFORMANCE OF URINARY FILTRATION, CONTINUOUS, GREATER THAN 18 HOURS PER DAY: ICD-10-PCS | Performed by: INTERNAL MEDICINE

## 2021-01-01 PROCEDURE — 99284 EMERGENCY DEPT VISIT MOD MDM: CPT

## 2021-01-01 PROCEDURE — 93970 EXTREMITY STUDY: CPT

## 2021-01-01 PROCEDURE — 85379 FIBRIN DEGRADATION QUANT: CPT | Performed by: UROLOGY

## 2021-01-01 PROCEDURE — 25010000002 CEFTRIAXONE PER 250 MG: Performed by: STUDENT IN AN ORGANIZED HEALTH CARE EDUCATION/TRAINING PROGRAM

## 2021-01-01 PROCEDURE — 93010 ELECTROCARDIOGRAM REPORT: CPT | Performed by: INTERNAL MEDICINE

## 2021-01-01 PROCEDURE — 93306 TTE W/DOPPLER COMPLETE: CPT

## 2021-01-01 PROCEDURE — 0T768DZ DILATION OF RIGHT URETER WITH INTRALUMINAL DEVICE, VIA NATURAL OR ARTIFICIAL OPENING ENDOSCOPIC: ICD-10-PCS | Performed by: UROLOGY

## 2021-01-01 PROCEDURE — 82436 ASSAY OF URINE CHLORIDE: CPT | Performed by: STUDENT IN AN ORGANIZED HEALTH CARE EDUCATION/TRAINING PROGRAM

## 2021-01-01 PROCEDURE — 80069 RENAL FUNCTION PANEL: CPT | Performed by: UROLOGY

## 2021-01-01 PROCEDURE — 86706 HEP B SURFACE ANTIBODY: CPT | Performed by: INTERNAL MEDICINE

## 2021-01-01 PROCEDURE — 85007 BL SMEAR W/DIFF WBC COUNT: CPT

## 2021-01-01 PROCEDURE — 83880 ASSAY OF NATRIURETIC PEPTIDE: CPT | Performed by: EMERGENCY MEDICINE

## 2021-01-01 PROCEDURE — 83735 ASSAY OF MAGNESIUM: CPT | Performed by: NURSE PRACTITIONER

## 2021-01-01 PROCEDURE — 94640 AIRWAY INHALATION TREATMENT: CPT

## 2021-01-01 PROCEDURE — 25010000002 MIDAZOLAM PER 1MG: Performed by: NURSE ANESTHETIST, CERTIFIED REGISTERED

## 2021-01-01 PROCEDURE — 85730 THROMBOPLASTIN TIME PARTIAL: CPT | Performed by: STUDENT IN AN ORGANIZED HEALTH CARE EDUCATION/TRAINING PROGRAM

## 2021-01-01 PROCEDURE — 25010000002 PROPOFOL 10 MG/ML EMULSION: Performed by: INTERNAL MEDICINE

## 2021-01-01 PROCEDURE — 85025 COMPLETE CBC W/AUTO DIFF WBC: CPT | Performed by: NURSE PRACTITIONER

## 2021-01-01 PROCEDURE — 36600 WITHDRAWAL OF ARTERIAL BLOOD: CPT | Performed by: INTERNAL MEDICINE

## 2021-01-01 PROCEDURE — 76775 US EXAM ABDO BACK WALL LIM: CPT

## 2021-01-01 PROCEDURE — 85379 FIBRIN DEGRADATION QUANT: CPT | Performed by: STUDENT IN AN ORGANIZED HEALTH CARE EDUCATION/TRAINING PROGRAM

## 2021-01-01 PROCEDURE — XW033H5 INTRODUCTION OF TOCILIZUMAB INTO PERIPHERAL VEIN, PERCUTANEOUS APPROACH, NEW TECHNOLOGY GROUP 5: ICD-10-PCS | Performed by: INTERNAL MEDICINE

## 2021-01-01 PROCEDURE — 87040 BLOOD CULTURE FOR BACTERIA: CPT

## 2021-01-01 PROCEDURE — 83880 ASSAY OF NATRIURETIC PEPTIDE: CPT | Performed by: STUDENT IN AN ORGANIZED HEALTH CARE EDUCATION/TRAINING PROGRAM

## 2021-01-01 PROCEDURE — 85025 COMPLETE CBC W/AUTO DIFF WBC: CPT | Performed by: EMERGENCY MEDICINE

## 2021-01-01 PROCEDURE — 82550 ASSAY OF CK (CPK): CPT | Performed by: INTERNAL MEDICINE

## 2021-01-01 PROCEDURE — 25010000002 ENOXAPARIN PER 10 MG

## 2021-01-01 PROCEDURE — 87493 C DIFF AMPLIFIED PROBE: CPT | Performed by: INTERNAL MEDICINE

## 2021-01-01 PROCEDURE — 25010000002 ONDANSETRON PER 1 MG: Performed by: REGISTERED NURSE

## 2021-01-01 PROCEDURE — P9047 ALBUMIN (HUMAN), 25%, 50ML: HCPCS | Performed by: INTERNAL MEDICINE

## 2021-01-01 PROCEDURE — 99233 SBSQ HOSP IP/OBS HIGH 50: CPT | Performed by: INTERNAL MEDICINE

## 2021-01-01 PROCEDURE — 25010000002 PHENYLEPHRINE 10 MG/ML SOLUTION: Performed by: PHYSICIAN ASSISTANT

## 2021-01-01 PROCEDURE — 84300 ASSAY OF URINE SODIUM: CPT | Performed by: STUDENT IN AN ORGANIZED HEALTH CARE EDUCATION/TRAINING PROGRAM

## 2021-01-01 PROCEDURE — 25010000002 CEFTRIAXONE PER 250 MG: Performed by: UROLOGY

## 2021-01-01 PROCEDURE — 87205 SMEAR GRAM STAIN: CPT

## 2021-01-01 PROCEDURE — 85025 COMPLETE CBC W/AUTO DIFF WBC: CPT | Performed by: STUDENT IN AN ORGANIZED HEALTH CARE EDUCATION/TRAINING PROGRAM

## 2021-01-01 PROCEDURE — 92950 HEART/LUNG RESUSCITATION CPR: CPT

## 2021-01-01 PROCEDURE — 36415 COLL VENOUS BLD VENIPUNCTURE: CPT | Performed by: STUDENT IN AN ORGANIZED HEALTH CARE EDUCATION/TRAINING PROGRAM

## 2021-01-01 PROCEDURE — 25010000002 PROPOFOL 10 MG/ML EMULSION: Performed by: NURSE ANESTHETIST, CERTIFIED REGISTERED

## 2021-01-01 PROCEDURE — 80053 COMPREHEN METABOLIC PANEL: CPT | Performed by: INTERNAL MEDICINE

## 2021-01-01 PROCEDURE — 99231 SBSQ HOSP IP/OBS SF/LOW 25: CPT | Performed by: UROLOGY

## 2021-01-01 PROCEDURE — 02HV33Z INSERTION OF INFUSION DEVICE INTO SUPERIOR VENA CAVA, PERCUTANEOUS APPROACH: ICD-10-PCS | Performed by: INTERNAL MEDICINE

## 2021-01-01 PROCEDURE — 84100 ASSAY OF PHOSPHORUS: CPT | Performed by: INTERNAL MEDICINE

## 2021-01-01 PROCEDURE — 84484 ASSAY OF TROPONIN QUANT: CPT | Performed by: EMERGENCY MEDICINE

## 2021-01-01 PROCEDURE — 93005 ELECTROCARDIOGRAM TRACING: CPT | Performed by: REGISTERED NURSE

## 2021-01-01 PROCEDURE — 80048 BASIC METABOLIC PNL TOTAL CA: CPT | Performed by: INTERNAL MEDICINE

## 2021-01-01 PROCEDURE — 80069 RENAL FUNCTION PANEL: CPT | Performed by: STUDENT IN AN ORGANIZED HEALTH CARE EDUCATION/TRAINING PROGRAM

## 2021-01-01 PROCEDURE — 82330 ASSAY OF CALCIUM: CPT | Performed by: INTERNAL MEDICINE

## 2021-01-01 PROCEDURE — 92950 HEART/LUNG RESUSCITATION CPR: CPT | Performed by: INTERNAL MEDICINE

## 2021-01-01 PROCEDURE — 36620 INSERTION CATHETER ARTERY: CPT | Performed by: INTERNAL MEDICINE

## 2021-01-01 PROCEDURE — 31500 INSERT EMERGENCY AIRWAY: CPT

## 2021-01-01 PROCEDURE — 76000 FLUOROSCOPY <1 HR PHYS/QHP: CPT

## 2021-01-01 PROCEDURE — 99221 1ST HOSP IP/OBS SF/LOW 40: CPT | Performed by: UROLOGY

## 2021-01-01 PROCEDURE — 25010000002 METOCLOPRAMIDE PER 10 MG: Performed by: NURSE ANESTHETIST, CERTIFIED REGISTERED

## 2021-01-01 PROCEDURE — 85379 FIBRIN DEGRADATION QUANT: CPT | Performed by: INTERNAL MEDICINE

## 2021-01-01 PROCEDURE — 93325 DOPPLER ECHO COLOR FLOW MAPG: CPT

## 2021-01-01 PROCEDURE — 94003 VENT MGMT INPAT SUBQ DAY: CPT

## 2021-01-01 PROCEDURE — 93308 TTE F-UP OR LMTD: CPT

## 2021-01-01 PROCEDURE — 0BH17EZ INSERTION OF ENDOTRACHEAL AIRWAY INTO TRACHEA, VIA NATURAL OR ARTIFICIAL OPENING: ICD-10-PCS | Performed by: INTERNAL MEDICINE

## 2021-01-01 PROCEDURE — 36415 COLL VENOUS BLD VENIPUNCTURE: CPT | Performed by: UROLOGY

## 2021-01-01 PROCEDURE — 52332 CYSTOSCOPY AND TREATMENT: CPT | Performed by: UROLOGY

## 2021-01-01 PROCEDURE — 25010000002 TOCILIZUMAB 400 MG/20ML SOLUTION 20 ML VIAL: Performed by: NURSE PRACTITIONER

## 2021-01-01 DEVICE — STNT CLASSC DBL PIG 4.5F 26CM: Type: IMPLANTABLE DEVICE | Site: URETER | Status: FUNCTIONAL

## 2021-01-01 RX ORDER — EPINEPHRINE 0.1 MG/ML
SYRINGE (ML) INJECTION
Status: COMPLETED | OUTPATIENT
Start: 2021-01-01 | End: 2021-01-01

## 2021-01-01 RX ORDER — LORAZEPAM 2 MG/ML
2 INJECTION INTRAMUSCULAR
Status: DISCONTINUED | OUTPATIENT
Start: 2021-01-01 | End: 2021-01-01 | Stop reason: HOSPADM

## 2021-01-01 RX ORDER — DEXMEDETOMIDINE HYDROCHLORIDE 100 UG/ML
INJECTION, SOLUTION INTRAVENOUS AS NEEDED
Status: DISCONTINUED | OUTPATIENT
Start: 2021-01-01 | End: 2021-01-01 | Stop reason: SURG

## 2021-01-01 RX ORDER — POTASSIUM CHLORIDE 29.8 MG/ML
20 INJECTION INTRAVENOUS AS NEEDED
Status: DISCONTINUED | OUTPATIENT
Start: 2021-01-01 | End: 2021-01-01

## 2021-01-01 RX ORDER — SODIUM BICARBONATE 650 MG/1
650 TABLET ORAL 3 TIMES DAILY
Status: DISCONTINUED | OUTPATIENT
Start: 2021-01-01 | End: 2021-01-01

## 2021-01-01 RX ORDER — CALCIUM CHLORIDE 100 MG/ML
INJECTION INTRAVENOUS; INTRAVENTRICULAR
Status: COMPLETED | OUTPATIENT
Start: 2021-01-01 | End: 2021-01-01

## 2021-01-01 RX ORDER — SODIUM CHLORIDE 9 MG/ML
9 INJECTION, SOLUTION INTRAVENOUS CONTINUOUS PRN
Status: DISCONTINUED | OUTPATIENT
Start: 2021-01-01 | End: 2021-01-01

## 2021-01-01 RX ORDER — METOCLOPRAMIDE HYDROCHLORIDE 5 MG/ML
INJECTION INTRAMUSCULAR; INTRAVENOUS AS NEEDED
Status: DISCONTINUED | OUTPATIENT
Start: 2021-01-01 | End: 2021-01-01 | Stop reason: SURG

## 2021-01-01 RX ORDER — DEXMEDETOMIDINE HYDROCHLORIDE 4 UG/ML
.2-1.5 INJECTION, SOLUTION INTRAVENOUS
Status: DISCONTINUED | OUTPATIENT
Start: 2021-01-01 | End: 2021-01-01

## 2021-01-01 RX ORDER — MIDAZOLAM HYDROCHLORIDE 2 MG/2ML
INJECTION, SOLUTION INTRAMUSCULAR; INTRAVENOUS AS NEEDED
Status: DISCONTINUED | OUTPATIENT
Start: 2021-01-01 | End: 2021-01-01 | Stop reason: SURG

## 2021-01-01 RX ORDER — IPRATROPIUM BROMIDE AND ALBUTEROL SULFATE 2.5; .5 MG/3ML; MG/3ML
3 SOLUTION RESPIRATORY (INHALATION)
Status: DISCONTINUED | OUTPATIENT
Start: 2021-01-01 | End: 2021-01-01

## 2021-01-01 RX ORDER — PROPOFOL 10 MG/ML
VIAL (ML) INTRAVENOUS AS NEEDED
Status: DISCONTINUED | OUTPATIENT
Start: 2021-01-01 | End: 2021-01-01 | Stop reason: SURG

## 2021-01-01 RX ORDER — LORAZEPAM 2 MG/ML
0.5 INJECTION INTRAMUSCULAR
Status: DISCONTINUED | OUTPATIENT
Start: 2021-01-01 | End: 2021-01-01 | Stop reason: HOSPADM

## 2021-01-01 RX ORDER — ARFORMOTEROL TARTRATE 15 UG/2ML
15 SOLUTION RESPIRATORY (INHALATION)
Status: DISCONTINUED | OUTPATIENT
Start: 2021-01-01 | End: 2021-01-01

## 2021-01-01 RX ORDER — FEBUXOSTAT 40 MG/1
40 TABLET, FILM COATED ORAL DAILY
Qty: 90 TABLET | Refills: 1 | Status: SHIPPED | OUTPATIENT
Start: 2021-01-01 | End: 2021-01-01

## 2021-01-01 RX ORDER — ALLOPURINOL 300 MG/1
300 TABLET ORAL DAILY
Qty: 60 TABLET | Refills: 0 | Status: SHIPPED | OUTPATIENT
Start: 2021-01-01

## 2021-01-01 RX ORDER — ACETAMINOPHEN 650 MG/1
650 SUPPOSITORY RECTAL EVERY 4 HOURS PRN
Status: DISCONTINUED | OUTPATIENT
Start: 2021-01-01 | End: 2021-01-01

## 2021-01-01 RX ORDER — DEXAMETHASONE SODIUM PHOSPHATE 10 MG/ML
10 INJECTION INTRAMUSCULAR; INTRAVENOUS 2 TIMES DAILY
Status: DISCONTINUED | OUTPATIENT
Start: 2021-01-01 | End: 2021-01-01

## 2021-01-01 RX ORDER — CALCIUM GLUCONATE 20 MG/ML
2 INJECTION, SOLUTION INTRAVENOUS ONCE
Status: DISCONTINUED | OUTPATIENT
Start: 2021-01-01 | End: 2021-01-01

## 2021-01-01 RX ORDER — SODIUM BICARBONATE 650 MG/1
1300 TABLET ORAL 3 TIMES DAILY
Status: DISCONTINUED | OUTPATIENT
Start: 2021-01-01 | End: 2021-01-01

## 2021-01-01 RX ORDER — FUROSEMIDE 10 MG/ML
40 INJECTION INTRAMUSCULAR; INTRAVENOUS ONCE
Status: COMPLETED | OUTPATIENT
Start: 2021-01-01 | End: 2021-01-01

## 2021-01-01 RX ORDER — FUROSEMIDE 10 MG/ML
80 INJECTION INTRAMUSCULAR; INTRAVENOUS ONCE
Status: COMPLETED | OUTPATIENT
Start: 2021-01-01 | End: 2021-01-01

## 2021-01-01 RX ORDER — DEXAMETHASONE SODIUM PHOSPHATE 10 MG/ML
10 INJECTION INTRAMUSCULAR; INTRAVENOUS DAILY
Status: DISCONTINUED | OUTPATIENT
Start: 2021-01-01 | End: 2021-01-01

## 2021-01-01 RX ORDER — DEXTROSE MONOHYDRATE 100 MG/ML
50-250 INJECTION, SOLUTION INTRAVENOUS
Status: DISCONTINUED | OUTPATIENT
Start: 2021-01-01 | End: 2021-01-01

## 2021-01-01 RX ORDER — MEPERIDINE HYDROCHLORIDE 25 MG/ML
12.5 INJECTION INTRAMUSCULAR; INTRAVENOUS; SUBCUTANEOUS
Status: ACTIVE | OUTPATIENT
Start: 2021-01-01 | End: 2021-01-01

## 2021-01-01 RX ORDER — TRAZODONE HYDROCHLORIDE 50 MG/1
25 TABLET ORAL NIGHTLY
COMMUNITY

## 2021-01-01 RX ORDER — FENTANYL CITRATE-0.9 % NACL/PF 10 MCG/ML
50-300 PLASTIC BAG, INJECTION (ML) INTRAVENOUS
Status: DISCONTINUED | OUTPATIENT
Start: 2021-01-01 | End: 2021-01-01 | Stop reason: HOSPADM

## 2021-01-01 RX ORDER — PROMETHAZINE HYDROCHLORIDE 25 MG/1
25 SUPPOSITORY RECTAL ONCE AS NEEDED
Status: DISCONTINUED | OUTPATIENT
Start: 2021-01-01 | End: 2021-01-01

## 2021-01-01 RX ORDER — SODIUM CHLORIDE 9 MG/ML
100 INJECTION, SOLUTION INTRAVENOUS CONTINUOUS
Status: DISCONTINUED | OUTPATIENT
Start: 2021-01-01 | End: 2021-01-01

## 2021-01-01 RX ORDER — HEPARIN SODIUM 1000 [USP'U]/ML
INJECTION, SOLUTION INTRAVENOUS; SUBCUTANEOUS AS NEEDED
Status: DISCONTINUED | OUTPATIENT
Start: 2021-01-01 | End: 2021-01-01

## 2021-01-01 RX ORDER — ACETAMINOPHEN 325 MG/1
650 TABLET ORAL EVERY 4 HOURS PRN
Status: DISCONTINUED | OUTPATIENT
Start: 2021-01-01 | End: 2021-01-01

## 2021-01-01 RX ORDER — DEXTROSE MONOHYDRATE 100 MG/ML
25 INJECTION, SOLUTION INTRAVENOUS
Status: DISCONTINUED | OUTPATIENT
Start: 2021-01-01 | End: 2021-01-01

## 2021-01-01 RX ORDER — KETAMINE HYDROCHLORIDE 50 MG/ML
INJECTION, SOLUTION, CONCENTRATE INTRAMUSCULAR; INTRAVENOUS AS NEEDED
Status: DISCONTINUED | OUTPATIENT
Start: 2021-01-01 | End: 2021-01-01 | Stop reason: SURG

## 2021-01-01 RX ORDER — PROMETHAZINE HYDROCHLORIDE 25 MG/1
25 TABLET ORAL ONCE AS NEEDED
Status: DISCONTINUED | OUTPATIENT
Start: 2021-01-01 | End: 2021-01-01

## 2021-01-01 RX ORDER — PAROXETINE 10 MG/1
10 TABLET, FILM COATED ORAL DAILY
COMMUNITY
Start: 2021-01-01

## 2021-01-01 RX ORDER — LORAZEPAM 2 MG/ML
1 CONCENTRATE ORAL
Status: DISCONTINUED | OUTPATIENT
Start: 2021-01-01 | End: 2021-01-01 | Stop reason: HOSPADM

## 2021-01-01 RX ORDER — SODIUM CHLORIDE, SODIUM LACTATE, POTASSIUM CHLORIDE, CALCIUM CHLORIDE 600; 310; 30; 20 MG/100ML; MG/100ML; MG/100ML; MG/100ML
125 INJECTION, SOLUTION INTRAVENOUS CONTINUOUS
Status: DISCONTINUED | OUTPATIENT
Start: 2021-01-01 | End: 2021-01-01

## 2021-01-01 RX ORDER — ACETAMINOPHEN 325 MG/1
650 TABLET ORAL EVERY 6 HOURS PRN
Status: DISCONTINUED | OUTPATIENT
Start: 2021-01-01 | End: 2021-01-01

## 2021-01-01 RX ORDER — CALCIUM GLUCONATE 20 MG/ML
2 INJECTION, SOLUTION INTRAVENOUS ONCE
Status: COMPLETED | OUTPATIENT
Start: 2021-01-01 | End: 2021-01-01

## 2021-01-01 RX ORDER — PAROXETINE 10 MG/1
10 TABLET, FILM COATED ORAL DAILY
Status: DISCONTINUED | OUTPATIENT
Start: 2021-01-01 | End: 2021-01-01

## 2021-01-01 RX ORDER — SODIUM CHLORIDE 9 MG/ML
75 INJECTION, SOLUTION INTRAVENOUS CONTINUOUS
Status: DISCONTINUED | OUTPATIENT
Start: 2021-01-01 | End: 2021-01-01

## 2021-01-01 RX ORDER — LEVOFLOXACIN 5 MG/ML
500 INJECTION, SOLUTION INTRAVENOUS ONCE
Status: COMPLETED | OUTPATIENT
Start: 2021-01-01 | End: 2021-01-01

## 2021-01-01 RX ORDER — BUDESONIDE 0.5 MG/2ML
0.5 INHALANT ORAL
Status: DISCONTINUED | OUTPATIENT
Start: 2021-01-01 | End: 2021-01-01

## 2021-01-01 RX ORDER — SODIUM CHLORIDE 0.9 % (FLUSH) 0.9 %
10 SYRINGE (ML) INJECTION AS NEEDED
Status: DISCONTINUED | OUTPATIENT
Start: 2021-01-01 | End: 2021-01-01 | Stop reason: HOSPADM

## 2021-01-01 RX ORDER — FEBUXOSTAT 40 MG/1
40 TABLET, FILM COATED ORAL DAILY
Qty: 90 TABLET | Refills: 1 | OUTPATIENT
Start: 2021-01-01 | End: 2021-09-19

## 2021-01-01 RX ORDER — ERGOCALCIFEROL 1.25 MG/1
50000 CAPSULE ORAL WEEKLY
COMMUNITY

## 2021-01-01 RX ORDER — SODIUM CHLORIDE 9 MG/ML
100 INJECTION, SOLUTION INTRAVENOUS CONTINUOUS
Status: CANCELLED | OUTPATIENT
Start: 2021-01-01

## 2021-01-01 RX ORDER — SODIUM CHLORIDE 0.9 % (FLUSH) 0.9 %
3 SYRINGE (ML) INJECTION EVERY 12 HOURS SCHEDULED
Status: DISCONTINUED | OUTPATIENT
Start: 2021-01-01 | End: 2021-01-01

## 2021-01-01 RX ORDER — EZETIMIBE AND SIMVASTATIN 10; 20 MG/1; MG/1
TABLET ORAL
Status: ON HOLD | COMMUNITY
End: 2021-01-01

## 2021-01-01 RX ORDER — AZITHROMYCIN 250 MG/1
500 TABLET, FILM COATED ORAL EVERY 24 HOURS
Status: COMPLETED | OUTPATIENT
Start: 2021-01-01 | End: 2021-01-01

## 2021-01-01 RX ORDER — ONDANSETRON 2 MG/ML
4 INJECTION INTRAMUSCULAR; INTRAVENOUS ONCE
Status: COMPLETED | OUTPATIENT
Start: 2021-01-01 | End: 2021-01-01

## 2021-01-01 RX ORDER — FAMOTIDINE 10 MG/ML
20 INJECTION, SOLUTION INTRAVENOUS DAILY
Status: DISCONTINUED | OUTPATIENT
Start: 2021-01-01 | End: 2021-01-01

## 2021-01-01 RX ORDER — SODIUM CHLORIDE 450 MG/100ML
100 INJECTION, SOLUTION INTRAVENOUS CONTINUOUS
Status: DISCONTINUED | OUTPATIENT
Start: 2021-01-01 | End: 2021-01-01

## 2021-01-01 RX ORDER — LORAZEPAM 2 MG/ML
1 INJECTION INTRAMUSCULAR
Status: DISCONTINUED | OUTPATIENT
Start: 2021-01-01 | End: 2021-01-01 | Stop reason: HOSPADM

## 2021-01-01 RX ORDER — PHENYLEPHRINE HCL IN 0.9% NACL 0.5 MG/5ML
.5-3 SYRINGE (ML) INTRAVENOUS
Status: DISCONTINUED | OUTPATIENT
Start: 2021-01-01 | End: 2021-01-01

## 2021-01-01 RX ORDER — NEBIVOLOL HYDROCHLORIDE 20 MG/1
20 TABLET ORAL DAILY
COMMUNITY
Start: 2021-01-01

## 2021-01-01 RX ORDER — LORAZEPAM 0.5 MG/1
1 TABLET ORAL
Status: DISCONTINUED | OUTPATIENT
Start: 2021-01-01 | End: 2021-01-01 | Stop reason: HOSPADM

## 2021-01-01 RX ORDER — OXYCODONE HYDROCHLORIDE 5 MG/1
5 TABLET ORAL
Status: COMPLETED | OUTPATIENT
Start: 2021-01-01 | End: 2021-01-01

## 2021-01-01 RX ORDER — HALOPERIDOL 5 MG/ML
4 INJECTION INTRAMUSCULAR EVERY 4 HOURS PRN
Status: DISCONTINUED | OUTPATIENT
Start: 2021-01-01 | End: 2021-01-01

## 2021-01-01 RX ORDER — SODIUM CHLORIDE 0.9 % (FLUSH) 0.9 %
10 SYRINGE (ML) INJECTION AS NEEDED
Status: CANCELLED | OUTPATIENT
Start: 2021-01-01

## 2021-01-01 RX ORDER — LORAZEPAM 2 MG/ML
1 INJECTION INTRAMUSCULAR EVERY 4 HOURS PRN
Status: DISCONTINUED | OUTPATIENT
Start: 2021-01-01 | End: 2021-01-01 | Stop reason: HOSPADM

## 2021-01-01 RX ORDER — SODIUM CHLORIDE 0.9 % (FLUSH) 0.9 %
10 SYRINGE (ML) INJECTION AS NEEDED
Status: DISCONTINUED | OUTPATIENT
Start: 2021-01-01 | End: 2021-01-01

## 2021-01-01 RX ORDER — FEBUXOSTAT 40 MG/1
40 TABLET, FILM COATED ORAL DAILY
COMMUNITY

## 2021-01-01 RX ORDER — ALBUMIN (HUMAN) 12.5 G/50ML
12.5 SOLUTION INTRAVENOUS AS NEEDED
Status: DISCONTINUED | OUTPATIENT
Start: 2021-01-01 | End: 2021-01-01

## 2021-01-01 RX ORDER — ALBUTEROL SULFATE 2.5 MG/3ML
2.5 SOLUTION RESPIRATORY (INHALATION) EVERY 6 HOURS PRN
Status: DISCONTINUED | OUTPATIENT
Start: 2021-01-01 | End: 2021-01-01

## 2021-01-01 RX ORDER — LORAZEPAM 0.5 MG/1
2 TABLET ORAL
Status: DISCONTINUED | OUTPATIENT
Start: 2021-01-01 | End: 2021-01-01 | Stop reason: HOSPADM

## 2021-01-01 RX ORDER — LORAZEPAM 2 MG/ML
0.5 INJECTION INTRAMUSCULAR ONCE
Status: COMPLETED | OUTPATIENT
Start: 2021-01-01 | End: 2021-01-01

## 2021-01-01 RX ORDER — LIDOCAINE HYDROCHLORIDE 20 MG/ML
INJECTION, SOLUTION INFILTRATION; PERINEURAL AS NEEDED
Status: DISCONTINUED | OUTPATIENT
Start: 2021-01-01 | End: 2021-01-01 | Stop reason: SURG

## 2021-01-01 RX ORDER — SODIUM CHLORIDE 0.9 % (FLUSH) 0.9 %
10 SYRINGE (ML) INJECTION EVERY 12 HOURS SCHEDULED
Status: CANCELLED | OUTPATIENT
Start: 2021-01-01

## 2021-01-01 RX ORDER — DIPHENOXYLATE HYDROCHLORIDE AND ATROPINE SULFATE 2.5; .025 MG/1; MG/1
1 TABLET ORAL
Status: DISCONTINUED | OUTPATIENT
Start: 2021-01-01 | End: 2021-01-01

## 2021-01-01 RX ORDER — ACETAMINOPHEN 160 MG/5ML
650 SOLUTION ORAL EVERY 4 HOURS PRN
Status: DISCONTINUED | OUTPATIENT
Start: 2021-01-01 | End: 2021-01-01

## 2021-01-01 RX ORDER — ONDANSETRON 2 MG/ML
INJECTION INTRAMUSCULAR; INTRAVENOUS AS NEEDED
Status: DISCONTINUED | OUTPATIENT
Start: 2021-01-01 | End: 2021-01-01 | Stop reason: SURG

## 2021-01-01 RX ORDER — PANTOPRAZOLE SODIUM 40 MG/10ML
40 INJECTION, POWDER, LYOPHILIZED, FOR SOLUTION INTRAVENOUS
Status: DISCONTINUED | OUTPATIENT
Start: 2021-01-01 | End: 2021-01-01

## 2021-01-01 RX ORDER — DEXTROSE MONOHYDRATE 50 MG/ML
100 INJECTION, SOLUTION INTRAVENOUS CONTINUOUS
Status: DISCONTINUED | OUTPATIENT
Start: 2021-01-01 | End: 2021-01-01

## 2021-01-01 RX ORDER — EZETIMIBE 10 MG/1
10 TABLET ORAL DAILY
COMMUNITY

## 2021-01-01 RX ORDER — DEXAMETHASONE SODIUM PHOSPHATE 10 MG/ML
10 INJECTION INTRAMUSCULAR; INTRAVENOUS ONCE
Status: COMPLETED | OUTPATIENT
Start: 2021-01-01 | End: 2021-01-01

## 2021-01-01 RX ORDER — SODIUM CHLORIDE, SODIUM LACTATE, POTASSIUM CHLORIDE, CALCIUM CHLORIDE 600; 310; 30; 20 MG/100ML; MG/100ML; MG/100ML; MG/100ML
150 INJECTION, SOLUTION INTRAVENOUS CONTINUOUS
Status: DISCONTINUED | OUTPATIENT
Start: 2021-01-01 | End: 2021-01-01

## 2021-01-01 RX ORDER — PREDNISONE 20 MG/1
20 TABLET ORAL DAILY
Qty: 15 TABLET | Refills: 0 | Status: ON HOLD | OUTPATIENT
Start: 2021-01-01 | End: 2021-01-01

## 2021-01-01 RX ORDER — DEXTROSE AND SODIUM CHLORIDE 5; .45 G/100ML; G/100ML
125 INJECTION, SOLUTION INTRAVENOUS CONTINUOUS
Status: DISCONTINUED | OUTPATIENT
Start: 2021-01-01 | End: 2021-01-01

## 2021-01-01 RX ORDER — ONDANSETRON 2 MG/ML
4 INJECTION INTRAMUSCULAR; INTRAVENOUS ONCE AS NEEDED
Status: DISCONTINUED | OUTPATIENT
Start: 2021-01-01 | End: 2021-01-01 | Stop reason: HOSPADM

## 2021-01-01 RX ORDER — NICOTINE POLACRILEX 4 MG
15 LOZENGE BUCCAL
Status: DISCONTINUED | OUTPATIENT
Start: 2021-01-01 | End: 2021-01-01

## 2021-01-01 RX ORDER — DEXAMETHASONE SODIUM PHOSPHATE 10 MG/ML
8 INJECTION INTRAMUSCULAR; INTRAVENOUS DAILY
Status: DISCONTINUED | OUTPATIENT
Start: 2021-01-01 | End: 2021-01-01

## 2021-01-01 RX ORDER — LORAZEPAM 2 MG/ML
2 CONCENTRATE ORAL
Status: DISCONTINUED | OUTPATIENT
Start: 2021-01-01 | End: 2021-01-01 | Stop reason: HOSPADM

## 2021-01-01 RX ORDER — MAGNESIUM HYDROXIDE 1200 MG/15ML
LIQUID ORAL AS NEEDED
Status: DISCONTINUED | OUTPATIENT
Start: 2021-01-01 | End: 2021-01-01 | Stop reason: HOSPADM

## 2021-01-01 RX ORDER — LORAZEPAM 2 MG/ML
0.5 CONCENTRATE ORAL
Status: DISCONTINUED | OUTPATIENT
Start: 2021-01-01 | End: 2021-01-01 | Stop reason: HOSPADM

## 2021-01-01 RX ORDER — DEXAMETHASONE SODIUM PHOSPHATE 10 MG/ML
6 INJECTION INTRAMUSCULAR; INTRAVENOUS DAILY
Status: DISCONTINUED | OUTPATIENT
Start: 2021-01-01 | End: 2021-01-01

## 2021-01-01 RX ORDER — MAGNESIUM SULFATE 1 G/100ML
2 INJECTION INTRAVENOUS AS NEEDED
Status: DISCONTINUED | OUTPATIENT
Start: 2021-01-01 | End: 2021-01-01

## 2021-01-01 RX ORDER — LORAZEPAM 0.5 MG/1
0.5 TABLET ORAL
Status: DISCONTINUED | OUTPATIENT
Start: 2021-01-01 | End: 2021-01-01 | Stop reason: HOSPADM

## 2021-01-01 RX ORDER — HEPARIN SODIUM 5000 [USP'U]/ML
5000 INJECTION, SOLUTION INTRAVENOUS; SUBCUTANEOUS EVERY 8 HOURS SCHEDULED
Status: DISCONTINUED | OUTPATIENT
Start: 2021-01-01 | End: 2021-01-01

## 2021-01-01 RX ORDER — DEXAMETHASONE SODIUM PHOSPHATE 10 MG/ML
10 INJECTION INTRAMUSCULAR; INTRAVENOUS DAILY
Status: CANCELLED | OUTPATIENT
Start: 2021-01-01

## 2021-01-01 RX ORDER — LEVOFLOXACIN 5 MG/ML
500 INJECTION, SOLUTION INTRAVENOUS ONCE
Status: CANCELLED | OUTPATIENT
Start: 2021-01-01 | End: 2021-01-01

## 2021-01-01 RX ORDER — SODIUM CHLORIDE 0.9 % (FLUSH) 0.9 %
10 SYRINGE (ML) INJECTION EVERY 12 HOURS SCHEDULED
Status: DISCONTINUED | OUTPATIENT
Start: 2021-01-01 | End: 2021-01-01 | Stop reason: HOSPADM

## 2021-01-01 RX ORDER — NEBIVOLOL 10 MG/1
20 TABLET ORAL DAILY
Status: DISCONTINUED | OUTPATIENT
Start: 2021-01-01 | End: 2021-01-01

## 2021-01-01 RX ORDER — LISINOPRIL AND HYDROCHLOROTHIAZIDE 25; 20 MG/1; MG/1
1 TABLET ORAL DAILY
COMMUNITY
Start: 2021-01-01

## 2021-01-01 RX ORDER — NOREPINEPHRINE BIT/0.9 % NACL 8 MG/250ML
.02-.3 INFUSION BOTTLE (ML) INTRAVENOUS
Status: DISCONTINUED | OUTPATIENT
Start: 2021-01-01 | End: 2021-01-01

## 2021-01-01 RX ADMIN — DEXAMETHASONE SODIUM PHOSPHATE 10 MG: 10 INJECTION INTRAMUSCULAR; INTRAVENOUS at 09:52

## 2021-01-01 RX ADMIN — SODIUM CHLORIDE 1 G: 900 INJECTION INTRAVENOUS at 23:55

## 2021-01-01 RX ADMIN — EPINEPHRINE 1 MG: 0.1 INJECTION, SOLUTION ENDOTRACHEAL; INTRACARDIAC; INTRAVENOUS at 13:13

## 2021-01-01 RX ADMIN — ENOXAPARIN SODIUM 40 MG: 40 INJECTION SUBCUTANEOUS at 12:41

## 2021-01-01 RX ADMIN — MAGNESIUM CHLORIDE, DEXTROSE MONOHYDRATE, LACTIC ACID, SODIUM CHLORIDE, SODIUM BICARBONATE AND POTASSIUM CHLORIDE 1000 ML/HR: 3.05; 22; 5.4; 7.07; 2.21; .314 INJECTION INTRAVENOUS at 12:16

## 2021-01-01 RX ADMIN — INSULIN LISPRO 6 UNITS: 100 INJECTION, SOLUTION INTRAVENOUS; SUBCUTANEOUS at 08:46

## 2021-01-01 RX ADMIN — AZITHROMYCIN MONOHYDRATE 500 MG: 250 TABLET ORAL at 00:22

## 2021-01-01 RX ADMIN — SODIUM CHLORIDE, POTASSIUM CHLORIDE, SODIUM LACTATE AND CALCIUM CHLORIDE 125 ML/HR: 600; 310; 30; 20 INJECTION, SOLUTION INTRAVENOUS at 09:25

## 2021-01-01 RX ADMIN — PROPOFOL 50 MCG/KG/MIN: 10 INJECTION, EMULSION INTRAVENOUS at 08:39

## 2021-01-01 RX ADMIN — SODIUM CHLORIDE, PRESERVATIVE FREE 10 ML: 5 INJECTION INTRAVENOUS at 08:17

## 2021-01-01 RX ADMIN — DEXMEDETOMIDINE HYDROCHLORIDE 0.7 MCG/KG/HR: 400 INJECTION, SOLUTION INTRAVENOUS at 06:43

## 2021-01-01 RX ADMIN — INSULIN DETEMIR 15 UNITS: 100 INJECTION, SOLUTION SUBCUTANEOUS at 12:41

## 2021-01-01 RX ADMIN — SODIUM BICARBONATE 1300 MG: 650 TABLET ORAL at 20:41

## 2021-01-01 RX ADMIN — SODIUM CHLORIDE 100 ML/HR: 9 INJECTION, SOLUTION INTRAVENOUS at 18:06

## 2021-01-01 RX ADMIN — SODIUM BICARBONATE 650 MG: 650 TABLET ORAL at 08:35

## 2021-01-01 RX ADMIN — CEFEPIME HYDROCHLORIDE 2 G: 2 INJECTION, POWDER, FOR SOLUTION INTRAVENOUS at 10:29

## 2021-01-01 RX ADMIN — CALCIUM CHLORIDE 1 G: 100 INJECTION INTRAVENOUS; INTRAVENTRICULAR at 13:13

## 2021-01-01 RX ADMIN — ONDANSETRON 4 MG: 2 INJECTION INTRAMUSCULAR; INTRAVENOUS at 18:00

## 2021-01-01 RX ADMIN — DEXAMETHASONE SODIUM PHOSPHATE 6 MG: 10 INJECTION INTRAMUSCULAR; INTRAVENOUS at 12:41

## 2021-01-01 RX ADMIN — ARFORMOTEROL TARTRATE 15 MCG: 15 SOLUTION RESPIRATORY (INHALATION) at 09:44

## 2021-01-01 RX ADMIN — NEBIVOLOL HYDROCHLORIDE 20 MG: 10 TABLET ORAL at 12:34

## 2021-01-01 RX ADMIN — IPRATROPIUM BROMIDE AND ALBUTEROL SULFATE 3 ML: .5; 2.5 SOLUTION RESPIRATORY (INHALATION) at 06:41

## 2021-01-01 RX ADMIN — MORPHINE SULFATE 4 MG: 4 INJECTION, SOLUTION INTRAMUSCULAR; INTRAVENOUS at 15:31

## 2021-01-01 RX ADMIN — CEFEPIME HYDROCHLORIDE 2 G: 2 INJECTION, POWDER, FOR SOLUTION INTRAVENOUS at 11:41

## 2021-01-01 RX ADMIN — AZITHROMYCIN MONOHYDRATE 500 MG: 250 TABLET ORAL at 01:18

## 2021-01-01 RX ADMIN — INSULIN LISPRO 5 UNITS: 100 INJECTION, SOLUTION INTRAVENOUS; SUBCUTANEOUS at 18:18

## 2021-01-01 RX ADMIN — MORPHINE SULFATE 4 MG: 4 INJECTION, SOLUTION INTRAMUSCULAR; INTRAVENOUS at 00:13

## 2021-01-01 RX ADMIN — LORAZEPAM 1 MG: 2 INJECTION INTRAMUSCULAR; INTRAVENOUS at 20:30

## 2021-01-01 RX ADMIN — IPRATROPIUM BROMIDE AND ALBUTEROL SULFATE 3 ML: .5; 2.5 SOLUTION RESPIRATORY (INHALATION) at 12:01

## 2021-01-01 RX ADMIN — NEBIVOLOL HYDROCHLORIDE 20 MG: 10 TABLET ORAL at 10:04

## 2021-01-01 RX ADMIN — LORAZEPAM 0.5 MG: 2 INJECTION INTRAMUSCULAR; INTRAVENOUS at 08:34

## 2021-01-01 RX ADMIN — DEXTROSE AND SODIUM CHLORIDE 125 ML/HR: 5; 450 INJECTION, SOLUTION INTRAVENOUS at 08:49

## 2021-01-01 RX ADMIN — SODIUM CHLORIDE 100 ML/HR: 9 INJECTION, SOLUTION INTRAVENOUS at 08:36

## 2021-01-01 RX ADMIN — BUDESONIDE 0.5 MG: 0.5 INHALANT ORAL at 07:23

## 2021-01-01 RX ADMIN — INSULIN LISPRO 5 UNITS: 100 INJECTION, SOLUTION INTRAVENOUS; SUBCUTANEOUS at 09:53

## 2021-01-01 RX ADMIN — FENTANYL CITRATE 150 MCG/HR: 50 INJECTION, SOLUTION INTRAMUSCULAR; INTRAVENOUS at 22:09

## 2021-01-01 RX ADMIN — BUDESONIDE 0.5 MG: 0.5 INHALANT ORAL at 22:00

## 2021-01-01 RX ADMIN — ARFORMOTEROL TARTRATE 15 MCG: 15 SOLUTION RESPIRATORY (INHALATION) at 07:24

## 2021-01-01 RX ADMIN — PAROXETINE HYDROCHLORIDE 10 MG: 10 TABLET, FILM COATED ORAL at 09:53

## 2021-01-01 RX ADMIN — HEPARIN SODIUM 5000 UNITS: 5000 INJECTION, SOLUTION INTRAVENOUS; SUBCUTANEOUS at 23:43

## 2021-01-01 RX ADMIN — DEXAMETHASONE SODIUM PHOSPHATE 10 MG: 10 INJECTION INTRAMUSCULAR; INTRAVENOUS at 12:33

## 2021-01-01 RX ADMIN — SODIUM BICARBONATE 150 MEQ: 84 INJECTION, SOLUTION INTRAVENOUS at 19:17

## 2021-01-01 RX ADMIN — ARFORMOTEROL TARTRATE 15 MCG: 15 SOLUTION RESPIRATORY (INHALATION) at 07:14

## 2021-01-01 RX ADMIN — VASOPRESSIN 0.08 UNITS/MIN: 20 INJECTION INTRAVENOUS at 08:56

## 2021-01-01 RX ADMIN — SODIUM CHLORIDE, POTASSIUM CHLORIDE, SODIUM LACTATE AND CALCIUM CHLORIDE 150 ML/HR: 600; 310; 30; 20 INJECTION, SOLUTION INTRAVENOUS at 21:59

## 2021-01-01 RX ADMIN — ARFORMOTEROL TARTRATE 15 MCG: 15 SOLUTION RESPIRATORY (INHALATION) at 22:19

## 2021-01-01 RX ADMIN — SODIUM BICARBONATE 150 MEQ: 84 INJECTION, SOLUTION INTRAVENOUS at 10:30

## 2021-01-01 RX ADMIN — HALOPERIDOL LACTATE 4 MG: 5 INJECTION, SOLUTION INTRAMUSCULAR at 09:47

## 2021-01-01 RX ADMIN — INSULIN DETEMIR 15 UNITS: 100 INJECTION, SOLUTION SUBCUTANEOUS at 21:45

## 2021-01-01 RX ADMIN — DEXMEDETOMIDINE HYDROCHLORIDE 0.2 MCG/KG/HR: 400 INJECTION, SOLUTION INTRAVENOUS at 23:31

## 2021-01-01 RX ADMIN — EPINEPHRINE 1 MG: 0.1 INJECTION, SOLUTION ENDOTRACHEAL; INTRACARDIAC; INTRAVENOUS at 13:17

## 2021-01-01 RX ADMIN — ENOXAPARIN SODIUM 100 MG: 100 INJECTION SUBCUTANEOUS at 08:38

## 2021-01-01 RX ADMIN — HALOPERIDOL LACTATE 4 MG: 5 INJECTION, SOLUTION INTRAMUSCULAR at 10:21

## 2021-01-01 RX ADMIN — ARFORMOTEROL TARTRATE 15 MCG: 15 SOLUTION RESPIRATORY (INHALATION) at 07:23

## 2021-01-01 RX ADMIN — SODIUM CHLORIDE 100 ML/HR: 9 INJECTION, SOLUTION INTRAVENOUS at 00:50

## 2021-01-01 RX ADMIN — ARFORMOTEROL TARTRATE 15 MCG: 15 SOLUTION RESPIRATORY (INHALATION) at 20:55

## 2021-01-01 RX ADMIN — DEXTROSE AND SODIUM CHLORIDE 125 ML/HR: 5; 450 INJECTION, SOLUTION INTRAVENOUS at 01:03

## 2021-01-01 RX ADMIN — SODIUM CHLORIDE, PRESERVATIVE FREE 3 ML: 5 INJECTION INTRAVENOUS at 20:19

## 2021-01-01 RX ADMIN — LORAZEPAM 1 MG: 2 INJECTION INTRAMUSCULAR; INTRAVENOUS at 22:15

## 2021-01-01 RX ADMIN — METOCLOPRAMIDE 10 MG: 5 INJECTION, SOLUTION INTRAMUSCULAR; INTRAVENOUS at 16:00

## 2021-01-01 RX ADMIN — PANTOPRAZOLE SODIUM 40 MG: 40 INJECTION, POWDER, FOR SOLUTION INTRAVENOUS at 06:13

## 2021-01-01 RX ADMIN — DEXAMETHASONE SODIUM PHOSPHATE 10 MG: 10 INJECTION INTRAMUSCULAR; INTRAVENOUS at 10:04

## 2021-01-01 RX ADMIN — PROPOFOL 50 MG: 10 INJECTION, EMULSION INTRAVENOUS at 16:14

## 2021-01-01 RX ADMIN — INSULIN LISPRO 5 UNITS: 100 INJECTION, SOLUTION INTRAVENOUS; SUBCUTANEOUS at 13:37

## 2021-01-01 RX ADMIN — BUDESONIDE 0.5 MG: 0.5 INHALANT ORAL at 07:24

## 2021-01-01 RX ADMIN — DEXAMETHASONE SODIUM PHOSPHATE 10 MG: 10 INJECTION INTRAMUSCULAR; INTRAVENOUS at 22:21

## 2021-01-01 RX ADMIN — LORAZEPAM 1 MG: 2 INJECTION INTRAMUSCULAR; INTRAVENOUS at 14:19

## 2021-01-01 RX ADMIN — MORPHINE SULFATE 4 MG: 4 INJECTION, SOLUTION INTRAMUSCULAR; INTRAVENOUS at 07:50

## 2021-01-01 RX ADMIN — IPRATROPIUM BROMIDE AND ALBUTEROL SULFATE 3 ML: .5; 2.5 SOLUTION RESPIRATORY (INHALATION) at 23:56

## 2021-01-01 RX ADMIN — INSULIN LISPRO 6 UNITS: 100 INJECTION, SOLUTION INTRAVENOUS; SUBCUTANEOUS at 14:15

## 2021-01-01 RX ADMIN — CALCIUM GLUCONATE 2 G: 20 INJECTION, SOLUTION INTRAVENOUS at 08:38

## 2021-01-01 RX ADMIN — SODIUM CHLORIDE 1 G: 900 INJECTION INTRAVENOUS at 23:26

## 2021-01-01 RX ADMIN — VASOPRESSIN 0.1 UNITS/MIN: 20 INJECTION INTRAVENOUS at 12:45

## 2021-01-01 RX ADMIN — SODIUM CHLORIDE, PRESERVATIVE FREE 3 ML: 5 INJECTION INTRAVENOUS at 10:28

## 2021-01-01 RX ADMIN — SODIUM CHLORIDE 100 ML/HR: 4.5 INJECTION, SOLUTION INTRAVENOUS at 10:10

## 2021-01-01 RX ADMIN — SODIUM BICARBONATE 650 MG: 650 TABLET ORAL at 17:44

## 2021-01-01 RX ADMIN — HALOPERIDOL LACTATE 4 MG: 5 INJECTION, SOLUTION INTRAMUSCULAR at 15:30

## 2021-01-01 RX ADMIN — FENTANYL CITRATE 150 MCG/HR: 50 INJECTION, SOLUTION INTRAMUSCULAR; INTRAVENOUS at 04:20

## 2021-01-01 RX ADMIN — SODIUM CHLORIDE, PRESERVATIVE FREE 3 ML: 5 INJECTION INTRAVENOUS at 08:39

## 2021-01-01 RX ADMIN — NEBIVOLOL HYDROCHLORIDE 20 MG: 10 TABLET ORAL at 09:53

## 2021-01-01 RX ADMIN — KETAMINE HYDROCHLORIDE 25 MG: 50 INJECTION, SOLUTION INTRAMUSCULAR; INTRAVENOUS at 16:08

## 2021-01-01 RX ADMIN — IPRATROPIUM BROMIDE AND ALBUTEROL SULFATE 3 ML: .5; 2.5 SOLUTION RESPIRATORY (INHALATION) at 00:06

## 2021-01-01 RX ADMIN — FENTANYL CITRATE 50 MCG/HR: 50 INJECTION, SOLUTION INTRAMUSCULAR; INTRAVENOUS at 13:46

## 2021-01-01 RX ADMIN — ENOXAPARIN SODIUM 100 MG: 100 INJECTION SUBCUTANEOUS at 01:25

## 2021-01-01 RX ADMIN — PANTOPRAZOLE SODIUM 40 MG: 40 INJECTION, POWDER, FOR SOLUTION INTRAVENOUS at 10:28

## 2021-01-01 RX ADMIN — PROPOFOL 50 MCG/KG/MIN: 10 INJECTION, EMULSION INTRAVENOUS at 05:52

## 2021-01-01 RX ADMIN — DEXAMETHASONE SODIUM PHOSPHATE 10 MG: 10 INJECTION INTRAMUSCULAR; INTRAVENOUS at 08:46

## 2021-01-01 RX ADMIN — FAMOTIDINE 20 MG: 10 INJECTION INTRAVENOUS at 09:46

## 2021-01-01 RX ADMIN — PROPOFOL 50 MCG/KG/MIN: 10 INJECTION, EMULSION INTRAVENOUS at 11:49

## 2021-01-01 RX ADMIN — SODIUM CHLORIDE, POTASSIUM CHLORIDE, SODIUM LACTATE AND CALCIUM CHLORIDE 125 ML/HR: 600; 310; 30; 20 INJECTION, SOLUTION INTRAVENOUS at 17:46

## 2021-01-01 RX ADMIN — DEXAMETHASONE SODIUM PHOSPHATE 10 MG: 10 INJECTION INTRAMUSCULAR; INTRAVENOUS at 08:35

## 2021-01-01 RX ADMIN — SODIUM CHLORIDE 1 G: 900 INJECTION INTRAVENOUS at 01:18

## 2021-01-01 RX ADMIN — SODIUM CHLORIDE 1000 ML: 9 INJECTION, SOLUTION INTRAVENOUS at 18:00

## 2021-01-01 RX ADMIN — SODIUM CHLORIDE 1 G: 900 INJECTION INTRAVENOUS at 00:21

## 2021-01-01 RX ADMIN — ENOXAPARIN SODIUM 100 MG: 100 INJECTION SUBCUTANEOUS at 10:29

## 2021-01-01 RX ADMIN — INSULIN HUMAN 0.8 UNITS/HR: 1 INJECTION, SOLUTION INTRAVENOUS at 03:36

## 2021-01-01 RX ADMIN — LORAZEPAM 1 MG: 2 INJECTION INTRAMUSCULAR; INTRAVENOUS at 10:37

## 2021-01-01 RX ADMIN — AZITHROMYCIN MONOHYDRATE 500 MG: 250 TABLET ORAL at 23:58

## 2021-01-01 RX ADMIN — ARFORMOTEROL TARTRATE 15 MCG: 15 SOLUTION RESPIRATORY (INHALATION) at 18:50

## 2021-01-01 RX ADMIN — ENOXAPARIN SODIUM 60 MG: 60 INJECTION SUBCUTANEOUS at 16:11

## 2021-01-01 RX ADMIN — HEPARIN SODIUM 5000 UNITS: 5000 INJECTION, SOLUTION INTRAVENOUS; SUBCUTANEOUS at 05:41

## 2021-01-01 RX ADMIN — ENOXAPARIN SODIUM 30 MG: 30 INJECTION SUBCUTANEOUS at 14:15

## 2021-01-01 RX ADMIN — SODIUM CHLORIDE 1000 ML: 9 INJECTION, SOLUTION INTRAVENOUS at 17:19

## 2021-01-01 RX ADMIN — HEPARIN SODIUM 5000 UNITS: 5000 INJECTION, SOLUTION INTRAVENOUS; SUBCUTANEOUS at 00:24

## 2021-01-01 RX ADMIN — SODIUM BICARBONATE 50 MEQ: 84 INJECTION, SOLUTION INTRAVENOUS at 13:14

## 2021-01-01 RX ADMIN — ARFORMOTEROL TARTRATE 15 MCG: 15 SOLUTION RESPIRATORY (INHALATION) at 22:00

## 2021-01-01 RX ADMIN — FUROSEMIDE 40 MG: 10 INJECTION, SOLUTION INTRAMUSCULAR; INTRAVENOUS at 04:29

## 2021-01-01 RX ADMIN — HEPARIN SODIUM 5000 UNITS: 5000 INJECTION, SOLUTION INTRAVENOUS; SUBCUTANEOUS at 14:57

## 2021-01-01 RX ADMIN — SODIUM BICARBONATE 1300 MG: 650 TABLET ORAL at 15:24

## 2021-01-01 RX ADMIN — INSULIN LISPRO 7 UNITS: 100 INJECTION, SOLUTION INTRAVENOUS; SUBCUTANEOUS at 08:35

## 2021-01-01 RX ADMIN — SODIUM BICARBONATE 1300 MG: 650 TABLET ORAL at 21:59

## 2021-01-01 RX ADMIN — VASOPRESSIN 0.1 UNITS/MIN: 20 INJECTION INTRAVENOUS at 14:37

## 2021-01-01 RX ADMIN — MORPHINE SULFATE 4 MG: 4 INJECTION, SOLUTION INTRAMUSCULAR; INTRAVENOUS at 20:30

## 2021-01-01 RX ADMIN — AZITHROMYCIN MONOHYDRATE 500 MG: 250 TABLET ORAL at 23:43

## 2021-01-01 RX ADMIN — LORAZEPAM 1 MG: 2 INJECTION INTRAMUSCULAR; INTRAVENOUS at 15:31

## 2021-01-01 RX ADMIN — BUDESONIDE 0.5 MG: 0.5 INHALANT ORAL at 20:30

## 2021-01-01 RX ADMIN — LIDOCAINE HYDROCHLORIDE 100 MG: 20 INJECTION, SOLUTION INFILTRATION; PERINEURAL at 16:11

## 2021-01-01 RX ADMIN — BUDESONIDE 0.5 MG: 0.5 INHALANT ORAL at 19:03

## 2021-01-01 RX ADMIN — IPRATROPIUM BROMIDE AND ALBUTEROL SULFATE 3 ML: .5; 2.5 SOLUTION RESPIRATORY (INHALATION) at 18:51

## 2021-01-01 RX ADMIN — ACETAMINOPHEN 650 MG: 650 SUPPOSITORY RECTAL at 21:20

## 2021-01-01 RX ADMIN — FUROSEMIDE 40 MG: 10 INJECTION, SOLUTION INTRAMUSCULAR; INTRAVENOUS at 09:52

## 2021-01-01 RX ADMIN — PROPOFOL 50 MCG/KG/MIN: 10 INJECTION, EMULSION INTRAVENOUS at 01:25

## 2021-01-01 RX ADMIN — PROPOFOL 50 MG: 10 INJECTION, EMULSION INTRAVENOUS at 16:22

## 2021-01-01 RX ADMIN — ARFORMOTEROL TARTRATE 15 MCG: 15 SOLUTION RESPIRATORY (INHALATION) at 06:41

## 2021-01-01 RX ADMIN — ACETAMINOPHEN 650 MG: 325 TABLET ORAL at 12:30

## 2021-01-01 RX ADMIN — MORPHINE SULFATE 4 MG: 4 INJECTION, SOLUTION INTRAMUSCULAR; INTRAVENOUS at 22:25

## 2021-01-01 RX ADMIN — HALOPERIDOL LACTATE 4 MG: 5 INJECTION, SOLUTION INTRAMUSCULAR at 00:30

## 2021-01-01 RX ADMIN — AZITHROMYCIN MONOHYDRATE 500 MG: 250 TABLET ORAL at 23:27

## 2021-01-01 RX ADMIN — FUROSEMIDE 40 MG: 10 INJECTION, SOLUTION INTRAMUSCULAR; INTRAVENOUS at 14:07

## 2021-01-01 RX ADMIN — BUDESONIDE 0.5 MG: 0.5 INHALANT ORAL at 09:44

## 2021-01-01 RX ADMIN — SODIUM BICARBONATE 650 MG: 650 TABLET ORAL at 18:09

## 2021-01-01 RX ADMIN — SODIUM CHLORIDE, PRESERVATIVE FREE 3 ML: 5 INJECTION INTRAVENOUS at 20:16

## 2021-01-01 RX ADMIN — FUROSEMIDE 40 MG: 10 INJECTION, SOLUTION INTRAMUSCULAR; INTRAVENOUS at 08:34

## 2021-01-01 RX ADMIN — SODIUM BICARBONATE 50 MEQ: 84 INJECTION, SOLUTION INTRAVENOUS at 20:15

## 2021-01-01 RX ADMIN — NEBIVOLOL HYDROCHLORIDE 20 MG: 10 TABLET ORAL at 08:35

## 2021-01-01 RX ADMIN — ARFORMOTEROL TARTRATE 15 MCG: 15 SOLUTION RESPIRATORY (INHALATION) at 18:51

## 2021-01-01 RX ADMIN — Medication 0.5 MCG/KG/MIN: at 00:20

## 2021-01-01 RX ADMIN — HALOPERIDOL LACTATE 4 MG: 5 INJECTION, SOLUTION INTRAMUSCULAR at 06:00

## 2021-01-01 RX ADMIN — DEXAMETHASONE SODIUM PHOSPHATE 10 MG: 10 INJECTION INTRAMUSCULAR; INTRAVENOUS at 20:43

## 2021-01-01 RX ADMIN — ENOXAPARIN SODIUM 30 MG: 30 INJECTION SUBCUTANEOUS at 14:08

## 2021-01-01 RX ADMIN — ENOXAPARIN SODIUM 30 MG: 30 INJECTION SUBCUTANEOUS at 12:34

## 2021-01-01 RX ADMIN — PAROXETINE HYDROCHLORIDE 10 MG: 10 TABLET, FILM COATED ORAL at 12:33

## 2021-01-01 RX ADMIN — HALOPERIDOL LACTATE 4 MG: 5 INJECTION, SOLUTION INTRAMUSCULAR at 18:18

## 2021-01-01 RX ADMIN — SODIUM BICARBONATE 650 MG: 650 TABLET ORAL at 20:43

## 2021-01-01 RX ADMIN — ONDANSETRON 4 MG: 2 INJECTION INTRAMUSCULAR; INTRAVENOUS at 01:47

## 2021-01-01 RX ADMIN — HALOPERIDOL LACTATE 4 MG: 5 INJECTION, SOLUTION INTRAMUSCULAR at 01:30

## 2021-01-01 RX ADMIN — SODIUM CHLORIDE 1000 ML: 9 INJECTION, SOLUTION INTRAVENOUS at 19:00

## 2021-01-01 RX ADMIN — FAMOTIDINE 20 MG: 10 INJECTION INTRAVENOUS at 12:41

## 2021-01-01 RX ADMIN — MIDAZOLAM HYDROCHLORIDE 2 MG: 1 INJECTION, SOLUTION INTRAMUSCULAR; INTRAVENOUS at 16:08

## 2021-01-01 RX ADMIN — DEXAMETHASONE SODIUM PHOSPHATE 8 MG: 10 INJECTION INTRAMUSCULAR; INTRAVENOUS at 08:43

## 2021-01-01 RX ADMIN — SODIUM BICARBONATE 50 MEQ: 84 INJECTION, SOLUTION INTRAVENOUS at 13:15

## 2021-01-01 RX ADMIN — SODIUM CHLORIDE 60 ML/HR: 4.5 INJECTION, SOLUTION INTRAVENOUS at 17:57

## 2021-01-01 RX ADMIN — VASOPRESSIN 0.02 UNITS/MIN: 20 INJECTION INTRAVENOUS at 14:17

## 2021-01-01 RX ADMIN — INSULIN LISPRO 5 UNITS: 100 INJECTION, SOLUTION INTRAVENOUS; SUBCUTANEOUS at 18:09

## 2021-01-01 RX ADMIN — IPRATROPIUM BROMIDE AND ALBUTEROL SULFATE 3 ML: .5; 2.5 SOLUTION RESPIRATORY (INHALATION) at 12:09

## 2021-01-01 RX ADMIN — SODIUM CHLORIDE, PRESERVATIVE FREE 3 ML: 5 INJECTION INTRAVENOUS at 09:50

## 2021-01-01 RX ADMIN — FUROSEMIDE 80 MG: 10 INJECTION, SOLUTION INTRAMUSCULAR; INTRAVENOUS at 10:10

## 2021-01-01 RX ADMIN — SODIUM CHLORIDE, POTASSIUM CHLORIDE, SODIUM LACTATE AND CALCIUM CHLORIDE: 600; 310; 30; 20 INJECTION, SOLUTION INTRAVENOUS at 16:44

## 2021-01-01 RX ADMIN — MORPHINE SULFATE 4 MG: 4 INJECTION, SOLUTION INTRAMUSCULAR; INTRAVENOUS at 20:15

## 2021-01-01 RX ADMIN — MORPHINE SULFATE 4 MG: 4 INJECTION, SOLUTION INTRAMUSCULAR; INTRAVENOUS at 18:00

## 2021-01-01 RX ADMIN — BUDESONIDE 0.5 MG: 0.5 INHALANT ORAL at 18:50

## 2021-01-01 RX ADMIN — FENTANYL CITRATE 150 MCG/HR: 50 INJECTION, SOLUTION INTRAMUSCULAR; INTRAVENOUS at 11:23

## 2021-01-01 RX ADMIN — BUDESONIDE 0.5 MG: 0.5 INHALANT ORAL at 22:50

## 2021-01-01 RX ADMIN — LORAZEPAM 1 MG: 2 INJECTION INTRAMUSCULAR; INTRAVENOUS at 16:27

## 2021-01-01 RX ADMIN — ARFORMOTEROL TARTRATE 15 MCG: 15 SOLUTION RESPIRATORY (INHALATION) at 23:55

## 2021-01-01 RX ADMIN — LORAZEPAM 1 MG: 2 INJECTION INTRAMUSCULAR; INTRAVENOUS at 05:48

## 2021-01-01 RX ADMIN — PROPOFOL 20 MCG/KG/MIN: 10 INJECTION, EMULSION INTRAVENOUS at 22:10

## 2021-01-01 RX ADMIN — OXYCODONE HYDROCHLORIDE 5 MG: 5 TABLET ORAL at 20:43

## 2021-01-01 RX ADMIN — SODIUM BICARBONATE 650 MG: 650 TABLET ORAL at 20:10

## 2021-01-01 RX ADMIN — BUDESONIDE 0.5 MG: 0.5 INHALANT ORAL at 22:19

## 2021-01-01 RX ADMIN — LORAZEPAM 1 MG: 2 INJECTION INTRAMUSCULAR; INTRAVENOUS at 01:26

## 2021-01-01 RX ADMIN — BUDESONIDE 0.5 MG: 0.5 INHALANT ORAL at 06:33

## 2021-01-01 RX ADMIN — PROPOFOL 50 MG: 10 INJECTION, EMULSION INTRAVENOUS at 16:11

## 2021-01-01 RX ADMIN — LORAZEPAM 1 MG: 2 INJECTION INTRAMUSCULAR; INTRAVENOUS at 11:09

## 2021-01-01 RX ADMIN — PROPOFOL 10 MCG/KG/MIN: 10 INJECTION, EMULSION INTRAVENOUS at 13:28

## 2021-01-01 RX ADMIN — MORPHINE SULFATE 4 MG: 4 INJECTION, SOLUTION INTRAMUSCULAR; INTRAVENOUS at 14:33

## 2021-01-01 RX ADMIN — INSULIN LISPRO 5 UNITS: 100 INJECTION, SOLUTION INTRAVENOUS; SUBCUTANEOUS at 12:42

## 2021-01-01 RX ADMIN — SODIUM BICARBONATE 650 MG: 650 TABLET ORAL at 10:05

## 2021-01-01 RX ADMIN — MORPHINE SULFATE 4 MG: 4 INJECTION, SOLUTION INTRAMUSCULAR; INTRAVENOUS at 10:37

## 2021-01-01 RX ADMIN — LEVOFLOXACIN 500 MG: 500 INJECTION, SOLUTION INTRAVENOUS at 15:26

## 2021-01-01 RX ADMIN — PAROXETINE HYDROCHLORIDE 10 MG: 10 TABLET, FILM COATED ORAL at 10:04

## 2021-01-01 RX ADMIN — MORPHINE SULFATE 4 MG: 4 INJECTION, SOLUTION INTRAMUSCULAR; INTRAVENOUS at 03:00

## 2021-01-01 RX ADMIN — SODIUM CHLORIDE 1 G: 900 INJECTION INTRAVENOUS at 23:43

## 2021-01-01 RX ADMIN — FUROSEMIDE 80 MG: 10 INJECTION, SOLUTION INTRAMUSCULAR; INTRAVENOUS at 12:53

## 2021-01-01 RX ADMIN — PAROXETINE HYDROCHLORIDE 10 MG: 10 TABLET, FILM COATED ORAL at 08:35

## 2021-01-01 RX ADMIN — BUDESONIDE 0.5 MG: 0.5 INHALANT ORAL at 07:14

## 2021-01-01 RX ADMIN — ARFORMOTEROL TARTRATE 15 MCG: 15 SOLUTION RESPIRATORY (INHALATION) at 22:50

## 2021-01-01 RX ADMIN — ARFORMOTEROL TARTRATE 15 MCG: 15 SOLUTION RESPIRATORY (INHALATION) at 06:33

## 2021-01-01 RX ADMIN — ONDANSETRON 4 MG: 2 INJECTION INTRAMUSCULAR; INTRAVENOUS at 16:00

## 2021-01-01 RX ADMIN — ARFORMOTEROL TARTRATE 15 MCG: 15 SOLUTION RESPIRATORY (INHALATION) at 19:03

## 2021-01-01 RX ADMIN — DEXAMETHASONE SODIUM PHOSPHATE 8 MG: 10 INJECTION INTRAMUSCULAR; INTRAVENOUS at 08:17

## 2021-01-01 RX ADMIN — BUDESONIDE 0.5 MG: 0.5 INHALANT ORAL at 20:55

## 2021-01-01 RX ADMIN — INSULIN HUMAN 7.6 UNITS/HR: 1 INJECTION, SOLUTION INTRAVENOUS at 10:00

## 2021-01-01 RX ADMIN — DEXMEDETOMIDINE HYDROCHLORIDE 40 MCG: 100 INJECTION, SOLUTION INTRAVENOUS at 16:43

## 2021-01-01 RX ADMIN — FENTANYL CITRATE 100 MCG/HR: 50 INJECTION, SOLUTION INTRAMUSCULAR; INTRAVENOUS at 17:17

## 2021-01-01 RX ADMIN — DEXAMETHASONE SODIUM PHOSPHATE 10 MG: 10 INJECTION INTRAMUSCULAR; INTRAVENOUS at 20:10

## 2021-01-01 RX ADMIN — ALBUMIN HUMAN 12.5 G: 0.25 SOLUTION INTRAVENOUS at 22:35

## 2021-01-01 RX ADMIN — OXYCODONE HYDROCHLORIDE 5 MG: 5 TABLET ORAL at 01:47

## 2021-01-01 RX ADMIN — TOCILIZUMAB 800 MG: 20 INJECTION, SOLUTION, CONCENTRATE INTRAVENOUS at 17:44

## 2021-01-01 RX ADMIN — ENOXAPARIN SODIUM 30 MG: 30 INJECTION SUBCUTANEOUS at 13:37

## 2021-01-01 RX ADMIN — SODIUM BICARBONATE 650 MG: 650 TABLET ORAL at 09:53

## 2021-01-01 RX ADMIN — SODIUM BICARBONATE 1300 MG: 650 TABLET ORAL at 08:44

## 2021-01-01 RX ADMIN — MORPHINE SULFATE 4 MG: 4 INJECTION, SOLUTION INTRAMUSCULAR; INTRAVENOUS at 06:43

## 2021-01-01 RX ADMIN — HEPARIN SODIUM 5000 UNITS: 5000 INJECTION, SOLUTION INTRAVENOUS; SUBCUTANEOUS at 06:36

## 2021-01-01 RX ADMIN — HALOPERIDOL LACTATE 4 MG: 5 INJECTION, SOLUTION INTRAMUSCULAR at 20:15

## 2021-01-01 RX ADMIN — ARFORMOTEROL TARTRATE 15 MCG: 15 SOLUTION RESPIRATORY (INHALATION) at 20:30

## 2021-05-12 NOTE — PROGRESS NOTES
Quick Note      Patient Name: Sabas Page   Patient ID: [id number]   Sex: Male   YOB: 1960    Primary Care Provider: Daksha QUEZADA    Visit Date: March 31, 2020    Provider: PILAR Perez   Location: [facility]   Location Address: [address]  [place]  [id number]   Location Phone: [phone]          History Of Present Illness  TELEHEALTH VISIT  Chief Complaint: 6 mth f.u   Sabas Page is a 59 year old /White male who is presenting for evaluation via telehealth visit. Verbal consent obtained before beginning visit.   Provider spent 7:02-7:13 minutes with the patient during telehealth visit.   The following staff were present during this visit: VALENCIA Francois, JOSE JUAN, pt and self (KCW)   Past Medical History/Overview of Patient Symptoms  Sabas Page is a 59 year old /White male who presents for evaluation and treatment of:      HTN:  He is doing well with current med.  DIMAS/STRESS:  He has had increase in stress but is having issues sleeping.    LIPID:  He is trying to watch diet.  He doesn't take chol med he is worried about taking it.  GOUT:  He is doing well overall.      He is having issues sleeping.  He has tried any med.  He is doing a nap in the evening.  No trialing of OTC meds    ROS:  CP/SOA, dizziness, no leg swelling, joints are better.           Assessment  · Hypertension     401.9/I10  · Hyperlipidemia     272.4/E78.5  · Gout     274.9/M10.9  · Stress     V62.89/F43.9  · DIMAS (generalized anxiety disorder)     300.02/F41.1  · Sleep disturbance     780.50/G47.9      Plan  · Orders  o Physician Telephone Evaluation, 11-20 minutes (36812) - - 03/31/2020  o ACO-39: Current medications updated and reviewed () - - 03/31/2020  o ACO-14: Influenza immunization administered or previously received () - - 03/31/2020  · Medications  o trazodone 50 mg oral tablet   SIG: take 0.5-1 tablet by oral route once a day (at  bedtime) for 90 days   DISP: (90) tablets with 1 refills  Prescribed on 03/31/2020     o Bystolic 20 mg oral tablet   SIG: take 1 tablet (20 mg) by oral route once daily for 90 days   DISP: (90) tablets with 1 refills  Refilled on 03/31/2020     o lisinopril-hydrochlorothiazide 20-25 mg oral tablet   SIG: take 1 tablet by oral route once daily for 90 days   DISP: (90) tablets with 1 refills  Refilled on 03/31/2020     o Paxil 10 mg oral tablet   SIG: take 1 tablet (10 mg) by oral route once daily for 90 days   DISP: (90) tablets with 1 refills  Refilled on 03/31/2020     o ergocalciferol (vitamin D2) 50,000 unit oral capsule   SIG: TAKE ONE CAPSULE BY MOUTH ONE TIME PER WEEK   DISP: (13) Capsule with 2 refills  Discontinued on 03/31/2020     o Uloric 40 mg oral tablet   SIG: TAKE ONE TABLET DAILY for 90 days   DISP: (90) Tablet with 1 refills  Discontinued on 03/31/2020     o Vitamin D2 50,000 unit oral capsule   SIG: take 1 capsule (50,000 unit) by oral route once weekly   DISP: (13) capsules with 1 refills  Discontinued on 03/31/2020     o Medications have been Reconciled  o Transition of Care or Provider Policy  · Instructions  o Advised that cheeses and other sources of dairy fats, animal fats, fast food, and the extras (candy, pastries, pies, doughnuts and cookies) all contain LDL raising nutrients. Advised to increase fruits, vegetables, whole grains, and to monitor portion sizes.   o Plan Of Care:   o Take all medications as prescribed/directed.  o Call the office with any concerns or questions.  o We will add the trazadone for sleeping--he will start with a 1/2 tab and if not getting about 3-4 hours of good sleep he will then increase to 1 tab. We will do labs in about 1 month if all clear to come in and he will call to check on that. F.U in 6 moths for reg appt and labs. Any SI/HI seek help immed.  o Electronically Identified Patient Education Materials Provided Electronically  · Disposition  o Call or  Return if symptoms worsen or persist.  o Return Visit Request in/on 6 months +/- 2 days ([id number]).            Electronically Signed by: PILAR Perez -Author on March 31, 2020 07:20:35 AM

## 2021-05-13 NOTE — PROGRESS NOTES
"   Progress Note      Patient Name: Sabas Page   Patient ID: [id number]   Sex: Male   YOB: 1960    Primary Care Provider: Daksha QUEZADA    Visit Date: October 6, 2020    Provider: PILAR Perez   Location: [facility]   Location Address: [address]  [place], [place]  [address]   Location Phone: [phone]          Chief Complaint     6 month follow up       History Of Present Illness  Sabas Page is a 60 year old /White male who presents for evaluation and treatment of:      He is here for refills.  His wife is here.  He has been having issues with sleeping but his wife comes in with TV at 1:30.  He is stressed and has gotten to see his grandkids--he has a new one that is over 6 months.  HTN: He is stressed and states \"I do take my meds daily\".  He just took his meds.   LIPID: He started to have knee pain and the chol med caused it and it stopped once he stopped the med.   GOUT:He has not had any flares.    Anxiety:  He is not taking it very often.  He will fall asleep but then wakes up.   He doesn't take the paxil.     He has been drinking apple cider vinegar.       Past Medical History  Disease Name Date Onset Notes   Broken Bones --  --    DIMAS (generalized anxiety disorder) 03/31/2020 --    Gout 03/31/2020 --    Hyperlipemia --  --    Hyperlipidemia --  --    Hypertension --  --    Stress 03/31/2020 --          Past Surgical History  Procedure Name Date Notes   Kidney --  --    Kidney Stone Surgery, Unspecified --  --    Percutaneous cryoablation of kidney --  --          Medication List  Name Date Started Instructions   buspirone 10 mg oral tablet 10/06/2020 TAKE 1 TABLET BY MOUTH EVERYDAY AT BEDTIME   Bystolic 20 mg oral tablet 10/06/2020 take 1 tablet (20 mg) by oral route once daily for 90 days   ergocalciferol (vitamin D2) 1,250 mcg (50,000 unit) oral capsule 10/06/2020 TAKE 1 CAPSULE BY MOUTH ONE TIME PER WEEK "   febuxostat 40 mg oral tablet 10/06/2020 TAKE 1 TABLET BY MOUTH EVERY DAY   lisinopril-hydrochlorothiazide 20-25 mg oral tablet 10/06/2020 take 1 tablet by oral route once daily for 90 days   trazodone 50 mg oral tablet 10/06/2020 take 0.5-1 tablet by oral route once a day (at bedtime) for 90 days   Zetia 10 mg oral tablet 10/06/2020 take 1 tablet (10 mg) by oral route once daily         Allergy List  Allergen Name Date Reaction Notes   NO KNOWN DRUG ALLERGIES --  --  --        Allergies Reconciled  Family Medical History  Disease Name Relative/Age Notes   Stroke Brother/70   --    Heart Disease Mother/   Mother   CVA (Cerebrovascular accident) Mother/   stroke just before her death   Hypertension Mother/   very high bp she was 80's when she passed away   Heart Attack (MI) Father/92   passed aware at age 92         Social History  Finding Status Start/Stop Quantity Notes   Alcohol Never --/-- --  --    Alcohol Use Never --/-- --  does not drink   Assessed for Abuse/Neglect --  --/-- --  Denies Abuse   Claustophobic Unknown --/-- --  yes   lives with spouse --  --/-- --  --    . --  --/-- --  --    Other Substance Use Never --/-- --  --    Recreational Drug Use Never --/-- --  no   Retail sales --  --/-- --  --    Smokeless tobacco Former --/-- --  10/09/2018 - quit 01/2014   Tobacco Former --/-- --  never smoker -chew   Working --  --/-- --  --          Immunizations  NameDate Admin Mfg Trade Name Lot Number Route Inj VIS Given VIS Publication   Nqeguawgj47/06/2020 PMC Fluzone Quadrivalent [id number] IM LD 10/06/2020 08/15/2019   Comments:    Tdap10/09/2018 SKB BOOSTRIX [id number] IM LD 10/09/2018 02/24/2015   Comments:          Review of Systems  · Constitutional  o Admits  o : weight loss  o Denies  o : fever, fatigue, weight gain  · Cardiovascular  o Denies  o : lower extremity edema, claudication, chest pressure, palpitations  · Respiratory  o Denies  o : shortness of breath, wheezing, cough, hemoptysis,  "dyspnea on exertion  · Gastrointestinal  o Denies  o : nausea, vomiting, diarrhea, constipation, abdominal pain  · Psychiatric  o Admits  o : anxiety, depression  o Denies  o : suicidal ideation, homicidal ideation      Vitals  Date Time BP Position Site L\R Cuff Size HR RR TEMP (F) WT  HT  BMI kg/m2 BSA m2 O2 Sat FR L/min FiO2 HC       10/06/2020 07:22 /85 Sitting    70 - R 14 96.9 213lbs 7oz 5'  9\" 31.52 2.17 96 %            Physical Examination  · Constitutional  o Appearance  o : well-nourished, well developed, alert, in no acute distress  · Respiratory  o Respiratory Effort  o : breathing unlabored  o Auscultation of Lungs  o : normal breath sounds throughout  · Cardiovascular  o Heart  o :   § Auscultation of Heart  § : regular rate and rhythm, no murmurs, gallops or rubs  § Palpation of Heart  § : normal apical impulse, no cardiac thrill present  o Peripheral Vascular System  o :   § Carotid Arteries  § : normal pulses bilaterally, no bruits present  § Pedal Pulses  § : pulses 2 bilaterally  § Extremities  § : no cyanosis, clubbing or edema; less than 2 second refill noted  · Neurologic  o Mental Status Examination  o :   § Orientation  § : grossly oriented to person, place and time  · Psychiatric  o Mood and Affect  o : mood tearful todayl, affect appropriate, denies any SI/HI          Assessment  · Generalized anxiety disorder     300.02/F41.1  · Essential hypertension     401.9/I10  · Hyperlipidemia     272.4/E78.5  · Screening for depression     V79.0/Z13.89  · Need for influenza vaccination     V04.81/Z23  · Screening for colon cancer     V76.51/Z12.11  · Stress     V62.89/F43.9  · Gout     274.9/M10.9      Plan  · Orders  o ACO-42: Not currently on a statin or hasn't received an order for a statin due to documented medical reason () - 272.4/E78.5 - 10/06/2020  o Immunization Admin Fee (Single) ([facility]) (01033) - - 10/06/2020  o ACO-18: Negative screen for clinical depression using a standardized " tool () - - 10/06/2020  o ACO-14: Influenza immunization administered or previously received () - - 10/06/2020  o ACO-13: Fall Risk Screening with no falls in past year or only one fall without injury in the past year (1101F) - - 10/06/2020  o Fluzone Quadrivalent Vaccine, age 6 months + (47426) - - 10/06/2020   Vaccine - Influenza; Dose: 0.5; Site: Left Deltoid; Route: Intramuscular; Date: 10/06/2020 08:23:00; Exp: 06/30/2021; Lot: [id number]; Mfg: sanofi pasteur; TradeName: Fluzone Quadrivalent; Administered By: Kathryn Reyes LPN; Comment: N/A  o Sissy (97686) - - 10/06/2020  · Medications  o Paxil 10 mg oral tablet   SIG: take 1 tablet (10 mg) by oral route once daily   DISP: (90) Tablet with 1 refills  Prescribed on 10/06/2020     o buspirone 10 mg oral tablet   SIG: TAKE 1 TABLET BY MOUTH EVERYDAY AT BEDTIME   DISP: (90) Tablet with 1 refills  Refilled on 10/06/2020     o Bystolic 20 mg oral tablet   SIG: take 1 tablet (20 mg) by oral route once daily for 90 days   DISP: (90) Tablet with 1 refills  Refilled on 10/06/2020     o ergocalciferol (vitamin D2) 1,250 mcg (50,000 unit) oral capsule   SIG: TAKE 1 CAPSULE BY MOUTH ONE TIME PER WEEK   DISP: (13) Capsule with 1 refills  Refilled on 10/06/2020     o febuxostat 40 mg oral tablet   SIG: TAKE 1 TABLET BY MOUTH EVERY DAY   DISP: (90) Tablet with 1 refills  Refilled on 10/06/2020     o lisinopril-hydrochlorothiazide 20-25 mg oral tablet   SIG: take 1 tablet by oral route once daily for 90 days   DISP: (90) Tablet with 1 refills  Refilled on 10/06/2020     o trazodone 50 mg oral tablet   SIG: take 0.5-1 tablet by oral route once a day (at bedtime) for 90 days   DISP: (90) Tablet with 1 refills  Refilled on 10/06/2020     o Zetia 10 mg oral tablet   SIG: take 1 tablet (10 mg) by oral route once daily   DISP: (90) Tablet with 1 refills  Refilled on 10/06/2020     o Medications have been Reconciled  o Transition of Care or Provider  Policy  · Instructions  o Advised that cheeses and other sources of dairy fats, animal fats, fast food, and the extras (candy, pasteries, pies, doughnuts and cookies) all contain LDL raising nutrients. Advised to increase fruits, vegetables, whole grains, and to monitor portion sizes.   o Depression Screen completed and scanned into the EMR under the designated folder within the patient's documents.  o PHQ-9 results 3  o Take all medications as prescribed/directed.  o Patient was educated/instructed on their diagnosis, treatment and medications prior to discharge from the clinic today.  o Patient instructed to seek medical attention urgently for new or worsening symptoms.  o Call the office with any concerns or questions.  o Risks, benefits, and alternatives were discussed with the patient. The patient is aware of risks associated with: high cholesterol the risk without taking med  o F.U in 6 months for labs and appt. He is going to try to tolerate the chol med. He declines the colonoscopy and will do cologuard. He is going to do the paxil and let me know.   o Electronically Identified Patient Education Materials Provided Electronically  · Disposition  o Call or Return if symptoms worsen or persist.  o Return Visit Request in/on 6 months +/- 2 days ([id number]).            Electronically Signed by: PILAR Perez -Author on October 6, 2020 08:39:36 AM

## 2021-05-14 NOTE — PROGRESS NOTES
"   Progress Note      Patient Name: Sabas Page   Patient ID: [id number]   Sex: Male   YOB: 1960    Primary Care Provider: Daksha QUEZADA    Visit Date: April 1, 2021    Provider: PILAR Perez   Location: [facility]   Location Address: [address]  [place]  [id number]   Location Phone: [phone]          Chief Complaint     6 Month follow up HTN,Hyperlipidemia       History Of Present Illness  Sabas Page is a 60 year old /White male who presents for evaluation and treatment of:      He is here for refills.  His wife is here.  They are getting to see the grandkids a little more  He has stress at work and he is hoping to retire and he started in 1993 and he will have 27 yrs.  HTN: He is stressed and states \"I do take my meds daily\".  He just took his meds.   LIPID: He has been taking it every night.   GOUT:He has not had any flares--he will get twinge every now and then.    Anxiety:  He is doing good overall.       Past Medical History  Disease Name Date Onset Notes   Broken Bones --  --    DIMAS (generalized anxiety disorder) 03/31/2020 --    Gout 03/31/2020 --    Hyperlipemia --  --    Hyperlipidemia --  --    Hypertension --  --    Stress 03/31/2020 --          Past Surgical History  Procedure Name Date Notes   Kidney --  --    Kidney Stone Surgery, Unspecified --  --    Percutaneous cryoablation of kidney --  --          Medication List  Name Date Started Instructions   buspirone 10 mg oral tablet 04/01/2021 TAKE 1 TABLET BY MOUTH EVERYDAY AT BEDTIME   Bystolic 20 mg oral tablet 04/01/2021 take 1 tablet (20 mg) by oral route once daily for 90 days   ergocalciferol (vitamin D2) 1,250 mcg (50,000 unit) oral capsule 04/01/2021 TAKE 1 CAPSULE BY MOUTH ONE TIME PER WEEK   febuxostat 40 mg oral tablet 04/01/2021 TAKE 1 TABLET BY MOUTH EVERY DAY   lisinopril-hydrochlorothiazide 20-25 mg oral tablet 04/01/2021 take 1 tablet by " oral route once daily for 90 days   Paxil 10 mg oral tablet 04/01/2021 take 1 tablet (10 mg) by oral route once daily   trazodone 50 mg oral tablet 04/01/2021 take 0.5-1 tablet by oral route once a day (at bedtime) for 90 days   Zetia 10 mg oral tablet 04/01/2021 take 1 tablet (10 mg) by oral route once daily         Allergy List  Allergen Name Date Reaction Notes   NO KNOWN DRUG ALLERGIES --  --  --          Family Medical History  Disease Name Relative/Age Notes   Stroke Brother/70   --    Heart Disease Mother/   Mother   CVA (Cerebrovascular accident) Mother/   stroke just before her death   Hypertension Mother/   very high bp she was 80's when she passed away   Heart Attack (MI) Father/92   passed aware at age 92         Social History  Finding Status Start/Stop Quantity Notes   Alcohol Never --/-- --  --    Alcohol Use Never --/-- --  does not drink   Assessed for Abuse/Neglect --  --/-- --  Denies Abuse   Claustophobic Unknown --/-- --  yes   lives with spouse --  --/-- --  --    . --  --/-- --  --    Other Substance Use Never --/-- --  --    Recreational Drug Use Never --/-- --  no   Retail sales --  --/-- --  --    Smokeless tobacco Former --/-- --  10/09/2018 - quit 01/2014   Tobacco Former --/-- --  never smoker -chew   Working --  --/-- --  --          Immunizations  NameDate Admin Mfg Trade Name Lot Number Route Inj VIS Given VIS Publication   Rbytitktc87/06/2020 PMC Fluzone Quadrivalent [id number]  LD 10/06/2020 08/15/2019   Comments:    Tdap10/09/2018 SKB BOOSTRIX 5N24G IM LD 10/09/2018 02/24/2015   Comments:          Review of Systems  · Constitutional  o Admits  o : weight gain  o Denies  o : fever, fatigue, weight loss  · Cardiovascular  o Denies  o : lower extremity edema, claudication, chest pressure, palpitations  · Respiratory  o Denies  o : shortness of breath, wheezing, cough, hemoptysis, dyspnea on exertion  · Gastrointestinal  o Denies  o : nausea, vomiting, diarrhea, constipation,  "abdominal pain  · Psychiatric  o Admits  o : anxiety, depression  o Denies  o : suicidal ideation, homicidal ideation      Vitals  Date Time BP Position Site L\R Cuff Size HR RR TEMP (F) WT  HT  BMI kg/m2 BSA m2 O2 Sat FR L/min FiO2 HC       04/01/2021 07:19 /82 Sitting    61 - R 20 97.2 213lbs 5oz 5'  9\" 31.5 2.17 96 %      04/01/2021 07:27 /82 Sitting                       Physical Examination  · Constitutional  o Appearance  o : well-nourished, well developed, alert, in no acute distress  · Respiratory  o Respiratory Effort  o : breathing unlabored  o Auscultation of Lungs  o : normal breath sounds throughout  · Cardiovascular  o Heart  o :   § Auscultation of Heart  § : regular rate and rhythm, no murmurs, gallops or rubs  § Palpation of Heart  § : normal apical impulse, no cardiac thrill present  o Peripheral Vascular System  o :   § Carotid Arteries  § : normal pulses bilaterally, no bruits present  § Pedal Pulses  § : pulses 2 bilaterally  § Extremities  § : no cyanosis, clubbing or edema; less than 2 second refill noted  · Neurologic  o Mental Status Examination  o :   § Orientation  § : grossly oriented to person, place and time  · Psychiatric  o Mood and Affect  o : mood tearful todayl, affect appropriate, denies any SI/HI          Assessment  · Generalized anxiety disorder     300.02/F41.1  · Essential hypertension     401.9/I10  · Hyperlipidemia     272.4/E78.5  · Stress     V62.89/F43.9  · Gout     274.9/M10.9      Plan  · Orders  o ACO-42: Not currently on a statin or hasn't received an order for a statin due to documented medical reason () - 272.4/E78.5 - 04/01/2021  o Vitamin D (25-Hydroxy) Level (07767) - - 04/01/2021  o ACO-13: Fall Risk Screening with no falls in past year or only one fall without injury in the past year (1101F) - - 04/01/2021  o Uric Acid (Serum) (78986) - - 04/01/2021  o Physical, Primary Care Panel (CBC, CMP, Lipid, TSH) [facility] (99237, 92511, 52733, 89964) - - " 04/01/2021  · Medications  o buspirone 10 mg oral tablet   SIG: TAKE 1 TABLET BY MOUTH EVERYDAY AT BEDTIME   DISP: (90) Tablet with 1 refills  Refilled on 04/01/2021     o Bystolic 20 mg oral tablet   SIG: take 1 tablet (20 mg) by oral route once daily for 90 days   DISP: (90) Tablet with 1 refills  Refilled on 04/01/2021     o ergocalciferol (vitamin D2) 1,250 mcg (50,000 unit) oral capsule   SIG: TAKE 1 CAPSULE BY MOUTH ONE TIME PER WEEK   DISP: (13) Capsule with 1 refills  Refilled on 04/01/2021     o febuxostat 40 mg oral tablet   SIG: TAKE 1 TABLET BY MOUTH EVERY DAY   DISP: (90) Tablet with 1 refills  Refilled on 04/01/2021     o lisinopril-hydrochlorothiazide 20-25 mg oral tablet   SIG: take 1 tablet by oral route once daily for 90 days   DISP: (90) Tablet with 1 refills  Refilled on 04/01/2021     o Paxil 10 mg oral tablet   SIG: take 1 tablet (10 mg) by oral route once daily   DISP: (90) Tablet with 1 refills  Refilled on 04/01/2021     o trazodone 50 mg oral tablet   SIG: take 0.5-1 tablet by oral route once a day (at bedtime) for 90 days   DISP: (90) Tablet with 1 refills  Refilled on 04/01/2021     o Zetia 10 mg oral tablet   SIG: take 1 tablet (10 mg) by oral route once daily   DISP: (90) Tablet with 1 refills  Refilled on 04/01/2021     o Medications have been Reconciled  o Transition of Care or Provider Policy  · Instructions  o Advised that cheeses and other sources of dairy fats, animal fats, fast food, and the extras (candy, pasteries, pies, doughnuts and cookies) all contain LDL raising nutrients. Advised to increase fruits, vegetables, whole grains, and to monitor portion sizes.   o Take all medications as prescribed/directed.  o Patient was educated/instructed on their diagnosis, treatment and medications prior to discharge from the clinic today.  o Patient instructed to seek medical attention urgently for new or worsening symptoms.  o Call the office with any concerns or questions.  o Risks,  benefits, and alternatives were discussed with the patient. The patient is aware of risks associated with: BP and chol  o F.U in 6 months for labs and appt.I have reviewed all medications and at this time no medications changes need to be adjusted for all chronic conditions.  · Disposition  o Call or Return if symptoms worsen or persist.  o Return Visit Request in/on 6 months +/- 2 days ([id number]).            Electronically Signed by: PILAR Perez -Author on April 1, 2021 08:01:18 AM

## 2021-06-21 NOTE — TELEPHONE ENCOUNTER
Caller: Sabas Page    Relationship: Self     CALL BACK: [phone]    Medication needed:   Requested Prescriptions     Pending Prescriptions Disp Refills   • febuxostat (ULORIC) 40 MG tablet 90 tablet 1     Sig: Take 1 tablet by mouth Daily for 90 days.       When do you need the refill by: 06/21/2021    Does the patient have less than a 3 day supply:  [x] Yes  [] No    What is the patient's preferred pharmacy: [facility]/PHARMACY #[id number] - [place], [place] [facility] [address] [facility] [phone] [facility] [phone] FX       PATIENT STATED THAT VERA BENITEZ WOULD NEED TO CALL THE PHARMACY

## 2021-06-21 NOTE — TELEPHONE ENCOUNTER
Caller: TRACY FORTE    Relationship: [facility]    Best call back number:   PRIOR AUTHORIZATION NUMBER:  [phone]  FAX: [phone]    What is the best time to reach you: ANY    Who are you requesting to speak with (clinical staff, provider,  specific staff member): CLINICAL STAFF    What was the call regarding: TRACY FROM [employer] CALLED STATING THAT THE PATIENT NEEDS TO HAVE A PRIOR AUTHORIZATION FOR febuxostat (ULORIC) 40 MG tablet.     Do you require a callback: YES

## 2021-06-24 NOTE — TELEPHONE ENCOUNTER
Caller: Sabas Page    Relationship: Self    Best call back number: [phone]    What medication are you requesting: N/A    What are your current symptoms: GOUT     Have you had these symptoms before:    [x] Yes  [] No    Have you been treated for these symptoms before:   [x] Yes  [] No    If a prescription is needed, what is your preferred pharmacy and phone number: [facility]AC[facility][id number] [place] [facility] [address] [facility] [phone] [facility] [phone] FX     Additional notes: PATIENT STATES THE MEDICATION febuxostat (ULORIC) 40 MG tablet IS NOT COVERED BY INSURANCE

## 2021-07-15 NOTE — TELEPHONE ENCOUNTER
Caller: Sabas Page    Relationship: Self    Best call back number: [phone]     What is the best time to reach you: ANY    What was the call regarding: PT CALLED TO INFORM THE DOCTOR HIS INSURANCE ISN'T COVERING HIS RX.  HE WOULD LIKE A CALL BACK TO DISCUSS THE NEXT STEP.  PLEASE ADVISE, THANK YOU.    Do you require a callback: YES

## 2021-07-20 NOTE — TELEPHONE ENCOUNTER
Talked to pt states his gout med is to high and insurance wont cover it pt states he would like to make appt and discuss other option

## 2021-07-29 PROBLEM — M10.9 GOUT: Status: ACTIVE | Noted: 2020-03-31

## 2021-07-29 PROBLEM — F41.1 GAD (GENERALIZED ANXIETY DISORDER): Status: ACTIVE | Noted: 2020-03-31

## 2021-07-29 PROBLEM — E78.5 HYPERLIPIDEMIA: Status: ACTIVE | Noted: 2021-01-01

## 2021-07-29 PROBLEM — I10 HYPERTENSION: Status: ACTIVE | Noted: 2021-01-01

## 2021-07-29 PROBLEM — F43.9 STRESS: Status: ACTIVE | Noted: 2020-03-31

## 2021-07-29 NOTE — PROGRESS NOTES
Chief Complaint  Med Refill (gout med to expensive)    Subjective          Sabas Page presents to [facility]  History of Present Illness  He has not been on the gout med for about 1 month.  His uloric is too expensive.  He feels that he is getting gout back in both of his ankles.  He goes golfing daily or several times a week.  He is having pain around the ankle up to the middle of the foot.  There is no swelling that he can see or any redness that he can see.  He is not taking any other medications for gout.      Past Medical History:   • Broken bones   • DIMAS (generalized anxiety disorder)   • Gout   • HLD (hyperlipidemia)   • HTN (hypertension)   • Hyperlipemia   • Stress       Allergies  Patient has no known allergies.    Past Surgical History:   • KIDNEY STONE SURGERY   • KIDNEY SURGERY   • PERCUTANEOUS NEPHROSTOMY    cryoablation        Social History     Tobacco Use   • Smoking status: Never Smoker   • Smokeless tobacco: Former User     Types: Chew   Substance Use Topics   • Alcohol use: Never   • Drug use: Never       Family History   Problem Relation Age of Onset   • Heart disease Mother    • Hypertension Mother         VERY HIGH BP SHE WAS 80'S WHEN PASSED AWAY   • Stroke Mother         CVA; JUST BEFORE HER DEATH   • Heart attack Father 92   • Stroke Brother 70        Health Maintenance Due   Topic Date Due   • ANNUAL PHYSICAL  Never done   • COVID-19 Vaccine (1) Never done   • ZOSTER VACCINE (1 of 2) Never done   • LIPID PANEL  07/26/2021          Current Outpatient Medications:   •  benzocaine (CHLORASEPTIC) 15 MG lozenge lozenge, Take  by mouth Every 2 (Two) Hours As Needed., Disp: , Rfl:   •  Bystolic 20 MG tablet, , Disp: , Rfl:   •  ergocalciferol (ERGOCALCIFEROL) 1.25 MG (33652 UT) capsule, Take 50,000 Units by mouth., Disp: , Rfl:   •  ezetimibe (ZETIA) 10 MG tablet, Take 10 mg by mouth Daily., Disp: , Rfl:   •  lisinopril-hydrochlorothiazide  (PRINZIDE,ZESTORETIC) 20-25 MG per tablet, Take 1 tablet by mouth Daily., Disp: , Rfl:   •  PARoxetine (PAXIL) 10 MG tablet, Take 10 mg by mouth Daily., Disp: , Rfl:   •  allopurinol (Zyloprim) 300 MG tablet, Take 1 tablet by mouth Daily., Disp: 60 tablet, Rfl: 0  •  ezetimibe-simvastatin (Vytorin) 10-20 MG per tablet, Vytorin 10-20 10-20 mg oral tablet take 1 tablet by oral route QHS   Suspended, Disp: , Rfl:   •  predniSONE (DELTASONE) 20 MG tablet, Take 1 tablet by mouth Daily. 1 tab po bid for 5 days then daily for 5 days, Disp: 15 tablet, Rfl: 0    Medications Discontinued During This Encounter   Medication Reason   • febuxostat (ULORIC) 40 MG tablet Cost of medication       Immunization History   Administered Date(s) Administered   • Influenza, Unspecified 10/06/2020   • Tdap 10/09/2018       Review of Systems   Musculoskeletal: Positive for arthralgias.        Objective       Vitals:    07/29/21 0809   BP: 150/98   Pulse:    Resp:    Temp:    SpO2:      Body mass index is 32.36 kg/m².         Physical Exam  Vitals reviewed.   Constitutional:       Appearance: Normal appearance. He is well-developed.   Cardiovascular:      Rate and Rhythm: Normal rate and regular rhythm.      Heart sounds: Normal heart sounds. No murmur heard.     Pulmonary:      Effort: Pulmonary effort is normal.      Breath sounds: Normal breath sounds.   Musculoskeletal:         General: No swelling.   Skin:     Findings: No erythema.   Neurological:      Mental Status: He is alert and oriented to person, place, and time.      Cranial Nerves: No cranial nerve deficit.      Motor: No weakness.   Psychiatric:         Mood and Affect: Mood and affect normal.             Result Review :     The following data was reviewed by: PILAR Perez on 07/29/2021:    Uric Acid    Common Labsle 4/1/21   Uric Acid 7.4                          Assessment and Plan      Diagnoses and all orders for this visit:    1. Idiopathic chronic gout of  ankle without tophus, unspecified laterality (Primary)  -     predniSONE (DELTASONE) 20 MG tablet; Take 1 tablet by mouth Daily. 1 tab po bid for 5 days then daily for 5 days  Dispense: 15 tablet; Refill: 0  -     allopurinol (Zyloprim) 300 MG tablet; Take 1 tablet by mouth Daily.  Dispense: 60 tablet; Refill: 0            Follow Up     No follow-ups on file.   He has a follow-up in September with me already.  We will do blood work at that time.  Today we will start prednisone as a prevention for 10 days and then he will start his allopurinol.  He has been off the medications for gout for about a month and I want him to be ready for the allopurinol.  Drink plenty of water.  Paper information given regarding diet choices for gout.    Patient was given instructions and counseling regarding his condition or for health maintenance advice. Please see specific information pulled into the AVS if appropriate.   PILRA Perez

## 2021-08-12 NOTE — TELEPHONE ENCOUNTER
Caller: Sabas Page    Relationship: Self    Best call back number: [phone]     What additional details did the patient provide when requesting the medication: PATIENT REPORTS HE IS HAVING SOME INSURANCE ISSUES WITH Bystolic 20 MG tablet. PATIENT STATES THAT PHARMACY INFORMED HIM THAT FOR A 90 DAY SUPPLY WOULD COST HIM OVER $500.      PATIENT WOULD LIKE TO KNOW IF WE HAVE ANY SAMPLES HE COULD USE UNTIL HIS NEXT APPOINTMENT; PATIENT REPORTS HE HAS 3 PILLS LEFT.    PATIENT REQUESTS CALL BACK    Does the patient have less than a 3 day supply:  [x] Yes  [] No    What is the patient's preferred pharmacy:

## 2021-08-12 NOTE — TELEPHONE ENCOUNTER
It is ok to give him 1 month of samples if we have them.  Pt did come in the office to talk to me but I was heading to a mtg.  He should apply for the pt assistance with the drug company--he has to fill out the forms and then we fax them to the assistance program.  He can print them off from [employer]

## 2021-08-16 NOTE — TELEPHONE ENCOUNTER
Spoke to pt-he states he was given enough samples until his Sept appt, states he will talk to Daksha then.

## 2021-09-06 PROBLEM — U07.1 ACUTE HYPOXEMIC RESPIRATORY FAILURE DUE TO COVID-19 (HCC): Status: ACTIVE | Noted: 2021-01-01

## 2021-09-06 PROBLEM — J96.01 ACUTE HYPOXEMIC RESPIRATORY FAILURE DUE TO COVID-19 (HCC): Status: ACTIVE | Noted: 2021-01-01

## 2021-09-06 PROBLEM — U07.1 COVID-19: Status: ACTIVE | Noted: 2021-01-01

## 2021-09-06 PROBLEM — U07.1 COVID-19 VIRUS DETECTED: Status: ACTIVE | Noted: 2021-01-01

## 2021-09-06 NOTE — ED PROVIDER NOTES
Time: 01:11 EDT  Arrived by: Car  Chief Complaint: Fatigue, shortness of breath  History provided by: Patient and wife      History of Present Illness:  Patient is a 61 y.o. year old male that presents to the emergency department with complaint of worsening fatigue and weakness, cough/shortness of breath, nausea/vomiting, fever, and diarrhea.  Patient reports that he was diagnosed with Covid about 10 days ago.  He talked to his PCP and treated his symptoms with Mucinex, but states that he is feeling worse.  He was seen at urgent care prior to arrival to the ED.     used: No    Weakness - Generalized  Severity:  Moderate  Duration:  10 days  Timing:  Constant  Progression:  Worsening  Chronicity:  New  Context: dehydration    Relieved by:  Nothing  Worsened by:  Nothing  Ineffective treatments:  None tried  Associated symptoms: cough, diarrhea, fever, myalgias, nausea, shortness of breath and vomiting    Associated symptoms: no chest pain    Cough:     Cough characteristics:  Productive    Sputum characteristics:  Unable to specify    Severity:  Moderate    Onset quality:  Gradual    Timing:  Intermittent    Chronicity:  New  Nausea:     Severity:  Mild    Timing:  Intermittent  Shortness of breath:     Severity:  Moderate    Onset quality:  Gradual    Timing:  Constant    Progression:  Worsening  Vomiting:     Quality:  Unable to specify    Severity:  Moderate    Timing:  Intermittent    Progression:  Unchanged          Similar Symptoms Previously: No  Recently seen: Yes, PCP      Patient Care Team  Primary Care Provider: Emperatriz Isbell    Past Medical History:     No Known Allergies  Past Medical History:   Diagnosis Date   • Broken bones    • Elevated cholesterol    • DIMAS (generalized anxiety disorder) 03/31/2020   • Gout 03/31/2020   • HLD (hyperlipidemia)    • HTN (hypertension)    • Hyperlipemia    • Stress 03/31/2020     Past Surgical History:   Procedure Laterality Date   • KIDNEY STONE  SURGERY     • KIDNEY SURGERY     • PERCUTANEOUS NEPHROSTOMY      cryoablation      Family History   Problem Relation Age of Onset   • Heart disease Mother    • Hypertension Mother         VERY HIGH BP SHE WAS 80'S WHEN PASSED AWAY   • Stroke Mother         CVA; JUST BEFORE HER DEATH   • Heart attack Father 92   • Stroke Brother 70       Home Medications:  Prior to Admission medications    Medication Sig Start Date End Date Taking? Authorizing Provider   allopurinol (Zyloprim) 300 MG tablet Take 1 tablet by mouth Daily. 7/29/21   Daksha Arcos APRN   benzocaine (CHLORASEPTIC) 15 MG lozenge lozenge Take  by mouth Every 2 (Two) Hours As Needed.    Alessandro Austin MD   Bystolic 20 MG tablet  5/15/21   Alessandro Austin MD   ergocalciferol (ERGOCALCIFEROL) 1.25 MG (72288 UT) capsule Take 50,000 Units by mouth.    Alessandro Austin MD   ezetimibe (ZETIA) 10 MG tablet Take 10 mg by mouth Daily.    Alessandro Austin MD   ezetimibe-simvastatin (Vytorin) 10-20 MG per tablet Vytorin 10-20 10-20 mg oral tablet take 1 tablet by oral route QHS   Suspended    Alessandro Austin MD   lisinopril-hydrochlorothiazide (PRINZIDE,ZESTORETIC) 20-25 MG per tablet Take 1 tablet by mouth Daily. 7/6/21   Alessandro Austin MD   PARoxetine (PAXIL) 10 MG tablet Take 10 mg by mouth Daily. 7/7/21   Alessandro Austin MD   predniSONE (DELTASONE) 20 MG tablet Take 1 tablet by mouth Daily. 1 tab po bid for 5 days then daily for 5 days 7/29/21   Daksha Arcos APRN        Social History:   PT  reports that he has never smoked. He has quit using smokeless tobacco.  His smokeless tobacco use included chew. He reports that he does not drink alcohol and does not use drugs.    Record Review:  I have reviewed the patient's records in [employer].     Review of Systems  Review of Systems   Constitutional: Positive for appetite change, fatigue and fever.   HENT: Positive for congestion.    Respiratory: Positive for cough  "and shortness of breath.    Cardiovascular: Negative for chest pain.   Gastrointestinal: Positive for diarrhea, nausea and vomiting.   Genitourinary: Positive for decreased urine volume.   Musculoskeletal: Positive for myalgias.   Neurological: Positive for weakness.   All other systems reviewed and are negative.       Physical Exam  /55 (BP Location: Right arm, Patient Position: Lying)   Pulse 60   Temp 97.7 °F (36.5 °C) (Oral)   Resp 16   Ht 175.3 cm (69\")   Wt 95.6 kg (210 lb 12.2 oz)   SpO2 93%   BMI 31.12 kg/m²     Physical Exam  Vitals and nursing note reviewed.   Constitutional:       General: He is not in acute distress.     Appearance: Normal appearance. He is obese. He is not toxic-appearing.   HENT:      Head: Normocephalic and atraumatic.      Mouth/Throat:      Mouth: Mucous membranes are moist.   Eyes:      Extraocular Movements: Extraocular movements intact.      Pupils: Pupils are equal, round, and reactive to light.   Cardiovascular:      Rate and Rhythm: Normal rate and regular rhythm.      Pulses: Normal pulses.      Heart sounds: Normal heart sounds.   Pulmonary:      Effort: Pulmonary effort is normal. Tachypnea present. No respiratory distress.      Breath sounds: Decreased breath sounds present. No wheezing or rhonchi.   Abdominal:      General: Abdomen is flat. Bowel sounds are normal.      Palpations: Abdomen is soft.      Tenderness: There is no abdominal tenderness. There is no guarding or rebound.   Musculoskeletal:         General: Normal range of motion.      Cervical back: Normal range of motion and neck supple.   Skin:     General: Skin is warm and dry.   Neurological:      Mental Status: He is alert and oriented to person, place, and time. Mental status is at baseline.                  ED Course  /55 (BP Location: Right arm, Patient Position: Lying)   Pulse 60   Temp 97.7 °F (36.5 °C) (Oral)   Resp 16   Ht 175.3 cm (69\")   Wt 95.6 kg (210 lb 12.2 oz)   SpO2 " 93%   BMI 31.12 kg/m²   Results for orders placed or performed during the hospital encounter of 09/06/21   Comprehensive Metabolic Panel    Specimen: Blood   Result Value Ref Range    Glucose 158 (H) 65 - 99 mg/dL     (H) 8 - 23 mg/dL    Creatinine 12.38 (H) 0.76 - 1.27 mg/dL    Sodium 131 (L) 136 - 145 mmol/L    Potassium 4.4 3.5 - 5.2 mmol/L    Chloride 87 (L) 98 - 107 mmol/L    CO2 13.4 (L) 22.0 - 29.0 mmol/L    Calcium 9.7 8.6 - 10.5 mg/dL    Total Protein 7.9 6.0 - 8.5 g/dL    Albumin 3.70 3.50 - 5.20 g/dL    ALT (SGPT) 30 1 - 41 U/L    AST (SGOT) 44 (H) 1 - 40 U/L    Alkaline Phosphatase 58 39 - 117 U/L    Total Bilirubin 0.4 0.0 - 1.2 mg/dL    eGFR Non African Amer 4 (L) >60 mL/min/1.73    eGFR  African Amer      Globulin 4.2 gm/dL    A/G Ratio 0.9 g/dL    BUN/Creatinine Ratio 11.2 7.0 - 25.0    Anion Gap 30.6 (H) 5.0 - 15.0 mmol/L   BNP    Specimen: Blood   Result Value Ref Range    proBNP 298.3 0.0 - 900.0 pg/mL   Troponin    Specimen: Blood   Result Value Ref Range    Troponin T 0.028 0.000 - 0.030 ng/mL   CBC Auto Differential    Specimen: Blood   Result Value Ref Range    WBC 8.60 3.40 - 10.80 10*3/mm3    RBC 4.67 4.14 - 5.80 10*6/mm3    Hemoglobin 13.5 13.0 - 17.7 g/dL    Hematocrit 38.9 37.5 - 51.0 %    MCV 83.3 79.0 - 97.0 fL    MCH 28.9 26.6 - 33.0 pg    MCHC 34.7 31.5 - 35.7 g/dL    RDW 14.8 12.3 - 15.4 %    RDW-SD 44.9 37.0 - 54.0 fl    MPV 9.6 6.0 - 12.0 fL    Platelets 318 140 - 450 10*3/mm3    Neutrophil % 90.9 (H) 42.7 - 76.0 %    Lymphocyte % 4.4 (L) 19.6 - 45.3 %    Monocyte % 4.2 (L) 5.0 - 12.0 %    Eosinophil % 0.0 (L) 0.3 - 6.2 %    Basophil % 0.0 0.0 - 1.5 %    Immature Grans % 0.5 0.0 - 0.5 %    Neutrophils, Absolute 7.82 (H) 1.70 - 7.00 10*3/mm3    Lymphocytes, Absolute 0.38 (L) 0.70 - 3.10 10*3/mm3    Monocytes, Absolute 0.36 0.10 - 0.90 10*3/mm3    Eosinophils, Absolute 0.00 0.00 - 0.40 10*3/mm3    Basophils, Absolute 0.00 0.00 - 0.20 10*3/mm3    Immature Grans, Absolute 0.04  0.00 - 0.05 10*3/mm3    nRBC 0.0 0.0 - 0.2 /100 WBC   CK    Specimen: Blood   Result Value Ref Range    Creatine Kinase 826 (H) 20 - 200 U/L   Ferritin    Specimen: Blood   Result Value Ref Range    Ferritin 2,465.00 (H) 30.00 - 400.00 ng/mL   Lactate Dehydrogenase    Specimen: Blood   Result Value Ref Range     (H) 135 - 225 U/L   D-dimer, Quantitative    Specimen: Blood   Result Value Ref Range    D-Dimer, Quantitative 1.26 (H) 0.00 - 0.59 mg/L (FEU)   C-reactive Protein    Specimen: Blood   Result Value Ref Range    C-Reactive Protein 23.71 (H) 0.00 - 0.50 mg/dL   Lactate Dehydrogenase    Specimen: Blood   Result Value Ref Range     (H) 135 - 225 U/L   aPTT    Specimen: Blood   Result Value Ref Range    PTT 26.3 22.2 - 34.2 seconds   Procalcitonin    Specimen: Blood   Result Value Ref Range    Procalcitonin 9.03 (H) 0.00 - 0.25 ng/mL   proBNP    Specimen: Blood   Result Value Ref Range    proBNP 233.1 0.0 - 900.0 pg/mL   Renal Function Panel    Specimen: Blood   Result Value Ref Range    Glucose 145 (H) 65 - 99 mg/dL     (H) 8 - 23 mg/dL    Creatinine 9.51 (H) 0.76 - 1.27 mg/dL    Sodium 132 (L) 136 - 145 mmol/L    Potassium 4.1 3.5 - 5.2 mmol/L    Chloride 98 98 - 107 mmol/L    CO2 13.0 (L) 22.0 - 29.0 mmol/L    Calcium 8.4 (L) 8.6 - 10.5 mg/dL    Albumin 3.00 (L) 3.50 - 5.20 g/dL    Phosphorus 7.4 (H) 2.5 - 4.5 mg/dL    Anion Gap 21.0 (H) 5.0 - 15.0 mmol/L    BUN/Creatinine Ratio 13.2 7.0 - 25.0    eGFR Non African Amer 6 (L) >60 mL/min/1.73    eGFR  African Amer     CBC Auto Differential    Specimen: Blood   Result Value Ref Range    WBC 7.38 3.40 - 10.80 10*3/mm3    RBC 4.19 4.14 - 5.80 10*6/mm3    Hemoglobin 12.2 (L) 13.0 - 17.7 g/dL    Hematocrit 35.8 (L) 37.5 - 51.0 %    MCV 85.4 79.0 - 97.0 fL    MCH 29.1 26.6 - 33.0 pg    MCHC 34.1 31.5 - 35.7 g/dL    RDW 14.7 12.3 - 15.4 %    RDW-SD 46.0 37.0 - 54.0 fl    MPV 9.6 6.0 - 12.0 fL    Platelets 257 140 - 450 10*3/mm3    Neutrophil % 91.2  (H) 42.7 - 76.0 %    Lymphocyte % 5.6 (L) 19.6 - 45.3 %    Monocyte % 2.6 (L) 5.0 - 12.0 %    Eosinophil % 0.0 (L) 0.3 - 6.2 %    Basophil % 0.1 0.0 - 1.5 %    Immature Grans % 0.5 0.0 - 0.5 %    Neutrophils, Absolute 6.73 1.70 - 7.00 10*3/mm3    Lymphocytes, Absolute 0.41 (L) 0.70 - 3.10 10*3/mm3    Monocytes, Absolute 0.19 0.10 - 0.90 10*3/mm3    Eosinophils, Absolute 0.00 0.00 - 0.40 10*3/mm3    Basophils, Absolute 0.01 0.00 - 0.20 10*3/mm3    Immature Grans, Absolute 0.04 0.00 - 0.05 10*3/mm3    nRBC 0.0 0.0 - 0.2 /100 WBC   Renal Function Panel    Specimen: Blood   Result Value Ref Range    Glucose 153 (H) 65 - 99 mg/dL     (H) 8 - 23 mg/dL    Creatinine 8.28 (H) 0.76 - 1.27 mg/dL    Sodium 133 (L) 136 - 145 mmol/L    Potassium 4.2 3.5 - 5.2 mmol/L    Chloride 99 98 - 107 mmol/L    CO2 14.7 (L) 22.0 - 29.0 mmol/L    Calcium 8.5 (L) 8.6 - 10.5 mg/dL    Albumin 3.20 (L) 3.50 - 5.20 g/dL    Phosphorus 6.3 (H) 2.5 - 4.5 mg/dL    Anion Gap 19.3 (H) 5.0 - 15.0 mmol/L    BUN/Creatinine Ratio 14.9 7.0 - 25.0    eGFR Non African Amer 7 (L) >60 mL/min/1.73    eGFR  African Amer     Renal Function Panel    Specimen: Blood   Result Value Ref Range    Glucose 272 (H) 65 - 99 mg/dL     (H) 8 - 23 mg/dL    Creatinine 6.52 (H) 0.76 - 1.27 mg/dL    Sodium 136 136 - 145 mmol/L    Potassium 4.1 3.5 - 5.2 mmol/L    Chloride 101 98 - 107 mmol/L    CO2 16.4 (L) 22.0 - 29.0 mmol/L    Calcium 9.2 8.6 - 10.5 mg/dL    Albumin 3.30 (L) 3.50 - 5.20 g/dL    Phosphorus 6.5 (H) 2.5 - 4.5 mg/dL    Anion Gap 18.6 (H) 5.0 - 15.0 mmol/L    BUN/Creatinine Ratio 16.9 7.0 - 25.0    eGFR Non African Amer 9 (L) >60 mL/min/1.73    eGFR  African Amer     ECG 12 Lead   Result Value Ref Range    QT Interval 414 ms   Green Top (Gel)   Result Value Ref Range    Extra Tube Hold for add-ons.    Lavender Top   Result Value Ref Range    Extra Tube hold for add-on    Gold Top - SST   Result Value Ref Range    Extra Tube Hold for add-ons.    Light  Blue Top   Result Value Ref Range    Extra Tube hold for add-on      Medications   sodium chloride 0.9 % flush 10 mL (has no administration in time range)   heparin (porcine) 5000 UNIT/ML injection 5,000 Units (5,000 Units Subcutaneous Given 9/7/21 2343)   arformoterol (BROVANA) nebulizer solution 15 mcg (15 mcg Nebulization Given 9/7/21 1851)   ipratropium-albuterol (DUO-NEB) nebulizer solution 3 mL (3 mL Nebulization Given 9/8/21 0006)   albuterol (PROVENTIL) nebulizer solution 0.083% 2.5 mg/3mL (has no administration in time range)   dexamethasone (DECADRON) injection 8 mg (8 mg Intravenous Given 9/7/21 0843)   cefTRIAXone (ROCEPHIN) 1 g/100 mL 0.9% NS (MBP) (1 g Intravenous New Bag 9/7/21 2343)   azithromycin (ZITHROMAX) tablet 500 mg (500 mg Oral Given 9/7/21 2343)   sodium bicarbonate tablet 1,300 mg (1,300 mg Oral Given 9/7/21 2041)   lactated ringers infusion (125 mL/hr Intravenous New Bag 9/7/21 1746)   acetaminophen (TYLENOL) tablet 650 mg (650 mg Oral Given 9/7/21 1230)   sodium chloride 0.9 % flush 10 mL (10 mL Intravenous Not Given 9/8/21 0051)   sodium chloride 0.9 % flush 10 mL (has no administration in time range)   sodium chloride 0.9 % infusion (100 mL/hr Intravenous New Bag 9/8/21 0050)   levoFLOXacin (LEVAQUIN) 500 mg/100 mL D5W (premix) (LEVAQUIN) 500 mg (has no administration in time range)   sodium chloride 0.9 % bolus 1,000 mL (0 mL Intravenous Stopped 9/6/21 2206)   ondansetron (ZOFRAN) injection 4 mg (4 mg Intravenous Given 9/6/21 1800)   sodium chloride 0.9 % bolus 1,000 mL (0 mL Intravenous Stopped 9/6/21 2207)     CT Abdomen Pelvis Without Contrast    Result Date: 9/6/2021  Narrative: PROCEDURE: CT ABDOMEN PELVIS WO CONTRAST  COMPARISONS: [facility], CT, ABD PEL W/O CONTRAST, 5/22/2018, 9:34.  [facility], CR, XR CHEST 1 VW, 9/06/2021, 16:30.  INDICATIONS: CHINO.  TECHNIQUE: 747 CT images were created without intravenous contrast.   PROTOCOL:   Standard  imaging protocol performed    RADIATION:   Automated exposure control was utilized to minimize radiation dose.  FINDINGS: Bibasilar infiltrates are seen, which are suggestive of infectious multifocal pneumonia associated with COVID-19 infection.  Please correlate with pertinent lab values.  No pleural effusion.  No cardiac enlargement.  There is a small hiatal hernia.  There is diffuse hepatic steatosis.  No hepatomegaly.  No splenomegaly.  There is severe right hydronephrosis with a suspected stricture at the ureteropelvic junction (UPJ).  No obstructing ureteral calculus is identified on the right or the left.  There is atrophy and renal cortical scarring on the left.  There may be mild renal cortical scarring on the right.  There may be tiny nonobstructing right renal calyceal stones, measuring 3 mm or less.  No definite right nephrolithiasis is seen.  The gallbladder is contracted.  No definite gallstones.  No acute pancreatitis.  No urinary bladder wall thickening is suggested.  No urinary bladder calculi.  There are bilateral inguinal hernias, which contain fat and vessels and no bowel.  They measure about 3.6 cm in greatest diameter, probably slightly larger in transverse diameter on the left.  There are scattered colonic diverticula without acute diverticulitis.  No acute appendicitis.  No acute colitis.  No mechanical bowel obstruction.  No pneumoperitoneum.  No pneumatosis.  No adrenal mass is seen.  There is minimal atherosclerotic change of the aortoiliac arterial system without aneurysmal dilatation.  Mild atherosclerotic change involves the left common femoral artery.  Minimal degenerative changes involve the imaged spine.  No acute fracture.  No aggressive osseous lesion.  A benign bone island involves the right ilium, measuring about 8 mm, as seen on image 187 of series 201, unchanged since the prior study.  There is diffuse prostatomegaly.  Please correlate with serum PSA values.  Nonspecific  dependent hyperdensity is seen within the lumen of the cecum and may represent ingested hyperdense medication.  CONCLUSION:   1. Bibasilar infiltrates are seen, which are most suggestive of infectious multifocal pneumonia, as discussed.  2. There is severe right-sided hydronephrosis with a suspected congenital or acquired stricture at the UPJ.   3. Renal cortical scarring is seen bilaterally, greater on the left.  There is left renal atrophy.    4. There may be tiny nonobstructing left renal calyceal stones.  No definite right nephrolithiasis.  No ureterolithiasis or obstructive uropathy due to a ureteral calculus.  No urinary bladder calculi are seen.   5. There may be mild diffuse prostatomegaly.   6. Please see above comments for further detail.     ROSA GALICIA JR, MD       Electronically Signed and Approved By: ROSA GALICIA JR, MD on 9/06/2021 at 21:24             US Renal Limited    Result Date: 9/7/2021  Narrative: PROCEDURE: US RENAL LIMITED  COMPARISON: [facility], CT, CT ABDOMEN PELVIS WO CONTRAST, 9/06/2021, 20:55.  INDICATIONS: CHINO  FINDINGS:  Right kidney measures 14.9 centimeters in length.  Left kidney measures 11.7 centimeters in length.  There is moderate right-sided hydronephrosis.  The exam is limited by bowel gas.  No renal lesion is evident.  Left kidney demonstrates cortical thinning.  No left-sided hydronephrosis.  Bladder not fully distended but otherwise unremarkable.  Left-sided nephrolith better seen on recent CT.  CONCLUSION:  1. The appearance is similar to previous CT scan from 9/6/2021. 2. Moderate right-sided hydronephrosis which could partly relate to narrowing at the right UPJ.  Right ureter not seen on ultrasound. 3. Renal cortical thinning and/or scarring could be present. 4. Left-sided nephrolith  better seen on previous CT. 5. The bladder is not well-distended.      RICARDO FAULKNER MD       Electronically Signed and Approved By: RICARDO FAULKNER MD on  9/07/2021 at 9:04             XR Chest 1 View    Result Date: 9/6/2021  Narrative: PROCEDURE: XR CHEST 1 VW  COMPARISON: [facility], , CHEST AP/PA 1 VIEW, 5/29/2012, 19:53.  INDICATIONS: SHORTNESS OF BREATH SINCE TODAY  FINDINGS:  Severe bilateral airspace disease is noted predominantly involving the lung bases.  The cardiac and mediastinal silhouettes appear normal.  No effusion or pneumothorax is seen.  CONCLUSION:  1. Severe bilateral airspace disease consistent with pneumonia.  COVID-19 infection would be in the differential.       Miles Uribe M.D.       Electronically Signed and Approved By: Miles Uribe M.D. on 9/06/2021 at 16:59               Procedures/EKGs:  Procedures        Medical Decision Making:                     MDM  Number of Diagnoses or Management Options  CHINO (acute kidney injury) (CMS/HCC): new and requires workup  Pneumonia due to COVID-19 virus: new and requires workup     Amount and/or Complexity of Data Reviewed  Clinical lab tests: reviewed and ordered  Tests in the radiology section of CPT®: reviewed and ordered  Discussion of test results with the performing providers: yes (I consulted Dr. Christianson, he will consult on this case.  He request the patient receive fluid.  I informed him that patient was currently receiving second liter of normal saline, he requested nothing further.    I consulted a hospitalist Dr. Radford for admission.)  Decide to obtain previous medical records or to obtain history from someone other than the patient: yes (Compared to prior CMP from 4/1/2021 with normal renal function at that time.)    Risk of Complications, Morbidity, and/or Mortality  Presenting problems: high  Diagnostic procedures: high  Management options: high    Critical Care  Total time providing critical care: 30-74 minutes       Final diagnoses:   CHINO (acute kidney injury) (CMS/HCC)   Pneumonia due to COVID-19 virus        Disposition:  ED Disposition     ED Disposition Condition  Comment    Decision to Admit  Level of Care: Telemetry [5]   Diagnosis: COVID-19 [[id number]]   Admitting Physician: TOMMY PEDERSON [[id number]]   Certification: I Certify That Inpatient Hospital Services Are Medically Necessary For Greater Than 2 Midnights             Pat Kaplan, PILAR  09/08/21 0111

## 2021-09-07 PROBLEM — N13.30 HYDRONEPHROSIS: Status: ACTIVE | Noted: 2021-01-01

## 2021-09-07 PROBLEM — N17.9 AKI (ACUTE KIDNEY INJURY) (HCC): Status: ACTIVE | Noted: 2021-01-01

## 2021-09-07 NOTE — H&P
[facility]   Urology HISTORY AND PHYSICAL    Patient Name: Sabas Page  : 1960  MRN: [id number]  Primary Care Physician:  Daksha Arcos APRN  Date of admission: (Not on file)    Subjective   Subjective     Chief Complaint: Elvated creatinine, right hydronephrosis    Patient has covid pna and was admitted with elevated creatinine.  CT scan revealed severe right hydronephrosis.  He has left renal scarring.       Personal History     Past Medical History:   Diagnosis Date   • Broken bones    • Elevated cholesterol    • DIMAS (generalized anxiety disorder) 2020   • Gout 2020   • HLD (hyperlipidemia)    • HTN (hypertension)    • Hyperlipemia    • Stress 2020       Past Surgical History:   Procedure Laterality Date   • KIDNEY STONE SURGERY     • KIDNEY SURGERY     • PERCUTANEOUS NEPHROSTOMY      cryoablation        Family History: family history includes Heart attack (age of onset: 92) in his father; Heart disease in his mother; Hypertension in his mother; Stroke in his mother; Stroke (age of onset: 70) in his brother. Otherwise pertinent FHx was reviewed and not pertinent to current issue.    Social History:  reports that he has never smoked. He has quit using smokeless tobacco.  His smokeless tobacco use included chew. He reports that he does not drink alcohol and does not use drugs.    Home Medications:  PARoxetine, allopurinol, ergocalciferol, ezetimibe, febuxostat, lisinopril-hydrochlorothiazide, nebivolol, and traZODone    Allergies:  No Known Allergies    Objective    Objective     Vitals:   Temp:  [97.6 °F (36.4 °C)-100 °F (37.8 °C)] 97.6 °F (36.4 °C)  Heart Rate:  [] 69  Resp:  [22-31] 24  BP: ()/(47-96) 114/64  Flow (L/min):  [2-15] 15    Physical Exam  Constitutional:       Appearance: Normal appearance.   Cardiovascular:      Rate and Rhythm: Normal rate and regular rhythm.   Pulmonary:      Effort: Pulmonary effort is normal.   Neurological:      Mental  Status: He is alert. Mental status is at baseline.   Psychiatric:         Mood and Affect: Mood and affect normal.         Speech: Speech normal.         Judgment: Judgment normal.         Result Review    Result Review:  I have personally reviewed the results from the time of this admission to 9/7/2021 17:48 EDT and agree with these findings:  [x]  Laboratory  [x]  Microbiology  [x]  Radiology  []  EKG/Telemetry   []  Cardiology/Vascular   []  Pathology  [x]  Old records  []  Other:      Assessment/Plan   Assessment / Plan       Active Hospital Problems:  There are no active hospital problems to display for this patient.      Plan: Cystoscopy and right ureteral stent placement.   Risks and benefits discussed with patient and they are agreeable to proceed.    DVT prophylaxis:  No DVT prophylaxis order currently exists.    CODE STATUS:           Electronically signed by Yesenia Roy MD, 09/07/21, 5:48 PM EDT.

## 2021-09-07 NOTE — CONSULTS
Patient Care Team:  Daksha Arcos APRN as PCP - General (Nurse Practitioner)    Chief complaint: CHINO/CKD3    Subjective     History of Present Illness   60yo with h/o HTN and CKD with baseline creatinine of 1.6 earlier this year.  He had been diagnosed with COVID 19 about 10 days ago and presented to ER with increased dyspnea and weakness.  He also had increased n/v and diarrhea.  He had creatinine of 12.3 and BUN of 139 on presentation.  He had been on HCTZ and lisinopril at home along with prednisone.  He had noted hypotension in ER and was given several liters of fluid with improvement.     Review of Systems   Constitutional: Positive for fatigue and fever. Negative for chills.   Respiratory: Positive for cough and shortness of breath.    Cardiovascular: Negative for chest pain and leg swelling.   Gastrointestinal: Negative for abdominal pain.   Genitourinary: Negative for dysuria.        Past Medical History:   Diagnosis Date   • Broken bones    • Elevated cholesterol    • DIMAS (generalized anxiety disorder) 03/31/2020   • Gout 03/31/2020   • HLD (hyperlipidemia)    • HTN (hypertension)    • Hyperlipemia    • Stress 03/31/2020   ,   Past Surgical History:   Procedure Laterality Date   • KIDNEY STONE SURGERY     • KIDNEY SURGERY     • PERCUTANEOUS NEPHROSTOMY      cryoablation    ,   Family History   Problem Relation Age of Onset   • Heart disease Mother    • Hypertension Mother         VERY HIGH BP SHE WAS 80'S WHEN PASSED AWAY   • Stroke Mother         CVA; JUST BEFORE HER DEATH   • Heart attack Father 92   • Stroke Brother 70   ,   Social History     Socioeconomic History   • Marital status:      Spouse name: Not on file   • Number of children: Not on file   • Years of education: Not on file   • Highest education level: Not on file   Tobacco Use   • Smoking status: Never Smoker   • Smokeless tobacco: Former User     Types: Chew   Substance and Sexual Activity   • Alcohol use: Never   • Drug  use: Never   • Sexual activity: Defer     E-cigarette/Vaping     E-cigarette/Vaping Substances     E-cigarette/Vaping Devices       ,   Medications Prior to Admission   Medication Sig Dispense Refill Last Dose   • allopurinol (Zyloprim) 300 MG tablet Take 1 tablet by mouth Daily. 60 tablet 0    • benzocaine (CHLORASEPTIC) 15 MG lozenge lozenge Take  by mouth Every 2 (Two) Hours As Needed.      • Bystolic 20 MG tablet       • ergocalciferol (ERGOCALCIFEROL) 1.25 MG (88518 UT) capsule Take 50,000 Units by mouth.      • ezetimibe (ZETIA) 10 MG tablet Take 10 mg by mouth Daily.      • ezetimibe-simvastatin (Vytorin) 10-20 MG per tablet Vytorin 10-20 10-20 mg oral tablet take 1 tablet by oral route QHS   Suspended      • lisinopril-hydrochlorothiazide (PRINZIDE,ZESTORETIC) 20-25 MG per tablet Take 1 tablet by mouth Daily.      • PARoxetine (PAXIL) 10 MG tablet Take 10 mg by mouth Daily.      • predniSONE (DELTASONE) 20 MG tablet Take 1 tablet by mouth Daily. 1 tab po bid for 5 days then daily for 5 days 15 tablet 0    , Scheduled Meds:  arformoterol, 15 mcg, Nebulization, BID - RT  azithromycin, 500 mg, Oral, Q24H  cefTRIAXone, 1 g, Intravenous, Q24H  dexamethasone, 8 mg, Intravenous, Daily  heparin (porcine), 5,000 Units, Subcutaneous, Q8H  ipratropium-albuterol, 3 mL, Nebulization, 4x Daily - RT  sodium bicarbonate, 1,300 mg, Oral, TID    , Continuous Infusions:  dextrose 5 % and sodium chloride 0.45 %, 125 mL/hr, Last Rate: 125 mL/hr (09/07/21 0103)    , PRN Meds:  •  albuterol  •  sodium chloride and Allergies:  Patient has no known allergies.    Objective     Vital Signs  Temp:  [98.2 °F (36.8 °C)-99.7 °F (37.6 °C)] 99 °F (37.2 °C)  Heart Rate:  [] 92  Resp:  [19-31] 22  BP: ()/(47-96) 151/64    No intake/output data recorded.  I/O last 3 completed shifts:  In: 4200 [I.V.:2000; IV Piggyback:2200]  Out: -     Physical Exam  HENT:      Head: Normocephalic.      Mouth/Throat:      Mouth: Mucous membranes  are moist.   Eyes:      Pupils: Pupils are equal, round, and reactive to light.   Cardiovascular:      Rate and Rhythm: Normal rate and regular rhythm.      Pulses: Normal pulses.   Pulmonary:      Effort: Pulmonary effort is normal.      Breath sounds: Rhonchi present.   Abdominal:      General: Abdomen is flat.      Palpations: Abdomen is soft.   Musculoskeletal:         General: Normal range of motion.      Cervical back: Normal range of motion.   Skin:     General: Skin is warm and dry.   Neurological:      Mental Status: He is alert.         Results Review:    I reviewed the patient's new clinical results.    WBC WBC   Date Value Ref Range Status   09/07/2021 7.38 3.40 - 10.80 10*3/mm3 Final   09/06/2021 8.60 3.40 - 10.80 10*3/mm3 Final      HGB Hemoglobin   Date Value Ref Range Status   09/07/2021 12.2 (L) 13.0 - 17.7 g/dL Final   09/06/2021 13.5 13.0 - 17.7 g/dL Final      HCT Hematocrit   Date Value Ref Range Status   09/07/2021 35.8 (L) 37.5 - 51.0 % Final   09/06/2021 38.9 37.5 - 51.0 % Final      Platlets No results found for: LABPLAT   MCV MCV   Date Value Ref Range Status   09/07/2021 85.4 79.0 - 97.0 fL Final   09/06/2021 83.3 79.0 - 97.0 fL Final          Sodium Sodium   Date Value Ref Range Status   09/07/2021 132 (L) 136 - 145 mmol/L Final   09/06/2021 131 (L) 136 - 145 mmol/L Final      Potassium Potassium   Date Value Ref Range Status   09/07/2021 4.1 3.5 - 5.2 mmol/L Final   09/06/2021 4.4 3.5 - 5.2 mmol/L Final      Chloride Chloride   Date Value Ref Range Status   09/07/2021 98 98 - 107 mmol/L Final   09/06/2021 87 (L) 98 - 107 mmol/L Final      CO2 CO2   Date Value Ref Range Status   09/07/2021 13.0 (L) 22.0 - 29.0 mmol/L Final   09/06/2021 13.4 (L) 22.0 - 29.0 mmol/L Final      BUN BUN   Date Value Ref Range Status   09/07/2021 126 (H) 8 - 23 mg/dL Final   09/06/2021 139 (H) 8 - 23 mg/dL Final      Creatinine Creatinine   Date Value Ref Range Status   09/07/2021 9.51 (H) 0.76 - 1.27 mg/dL  Final   09/06/2021 12.38 (H) 0.76 - 1.27 mg/dL Final      Calcium Calcium   Date Value Ref Range Status   09/07/2021 8.4 (L) 8.6 - 10.5 mg/dL Final   09/06/2021 9.7 8.6 - 10.5 mg/dL Final      PO4 No results found for: CAPO4   Albumin Albumin   Date Value Ref Range Status   09/07/2021 3.00 (L) 3.50 - 5.20 g/dL Final   09/06/2021 3.70 3.50 - 5.20 g/dL Final      Magnesium No results found for: MG   Uric Acid No results found for: URICACID         Assessment/Plan       COVID-19    Acute hypoxemic respiratory failure due to COVID-19 (CMS/HCC)    COVID-19 virus detected      Assessment & Plan   CHINO/CKD-  He had CHINO due to hypovolemia/hypotension with significant improvement of creatinine to 9.5 today.  Urine studies ordered, pending at this time.  CT with noted severe right-sided hydronephrosis and left renal atrophy.  Would rec Urology consult for evaluation.  Continue supportive care at this time.  Changing IVF's to LR.  Hyponatremia-  Mild at this time, hypovolemic.  Improved today with IVF's.  Will change to LR from 1/2NS.  Met Acidosis-  Due to uremia.  On oral bicarb, continue same.  Covid PNA-  On steroids  H/o HTN-  Lisinopril/hctz on hold.  H/o Gout-    I discussed the patients findings and my recommendations with patient    Jacques Christianson MD  09/07/21  08:41 EDT

## 2021-09-07 NOTE — CASE MANAGEMENT/SOCIAL WORK
Discharge Planning Assessment   [place]     Patient Name: Sabas Page  MRN: [id number]  Today's Date: 9/7/2021    Admit Date: 9/6/2021    Discharge Needs Assessment     Row Name 09/07/21 1100       Living Environment    Lives With  spouse    Name(s) of Who Lives With Patient  Ms. Page    Current Living Arrangements  home/apartment/condo    Primary Care Provided by  self    Provides Primary Care For  no one    Family Caregiver if Needed  spouse    Quality of Family Relationships  supportive    Able to Return to Prior Arrangements  yes       Resource/Environmental Concerns    Resource/Environmental Concerns  none       Transition Planning    Patient/Family Anticipates Transition to  home with family    Patient/Family Anticipated Services at Transition  none    Transportation Anticipated  family or friend will provide       Discharge Needs Assessment    Readmission Within the Last 30 Days  no previous admission in last 30 days    Equipment Currently Used at Home  none    Concerns to be Addressed  discharge planning    Equipment Needed After Discharge  oxygen;nebulizer        Discharge Plan     Row Name 09/07/21 1101       Plan    Plan  SW attempted to contact pts room. Pt on airvo at this time. SW completed assessment with pts wife. Wife states that pt is normally independent. Pt will use [facility] pharmacy at discharge. Pt has PCP and wife will be transporting at discharge. Wife plans for pt to return home, unless recommended otherwise. Wife is agreeable to HHC or rehab if needed. SW will continue to follow for discharge planning and DME arrangement.        Continued Care and Services - Admitted Since 9/6/2021    Coordination has not been started for this encounter.         Demographic Summary     Row Name 09/07/21 1059       General Information    Admission Type  inpatient    Arrived From  emergency department    Reason for Consult  discharge planning    Preferred Language  English     Used During  This Interaction  no       Contact Information    Contact Information Comments  SW placed call to pts wife-Ms. Page.        Functional Status     Row Name 09/07/21 1059       Functional Status    Usual Activity Tolerance  good       Functional Status, IADL    Medications  independent    Meal Preparation  independent    Housekeeping  independent    Laundry  independent    Shopping  independent       Employment/    Employment Status  retired    Employment/ Comments  Wife states pt is normally independent and continues to drive self. Wife is able to assist if needed.        Psychosocial    No documentation.       Abuse/Neglect    No documentation.       Legal    No documentation.       Substance Abuse    No documentation.       Patient Forms    No documentation.           ORLIN Card

## 2021-09-07 NOTE — PLAN OF CARE
Goal Outcome Evaluation:  Plan of Care Reviewed With: patient        Progress: no change  Outcome Summary: Patient was asleep most of the day. Pt had no complaints at this time. Patient is still on oxygen.

## 2021-09-07 NOTE — PAYOR COMM NOTE
"Sabas Flanagan (61 y.o. Male)     Date of Birth Social Security Number Address Home Phone MRN    1960  [address]  [place] [address] [phone] [id number]    Jainism Marital Status          None        Admission Date Admission Type Admitting Provider Attending Provider Department, Room/Bed    9/6/21 Emergency Austen Whitman DO Schulz, Jamie, DO [facility] 4TH FLOOR MEDICAL TELEMETRY UNIT, 407/1    Discharge Date Discharge Disposition Discharge Destination                       Attending Provider: Austen Whitman DO    Allergies: No Known Allergies    Isolation: Contact Air   Infection: COVID (confirmed) (09/07/21)   Code Status: CPR    Ht: 175.3 cm (69\")   Wt: 95.6 kg (210 lb 12.2 oz)    Admission Cmt: None   Principal Problem: None                Active Insurance as of 9/6/2021     Primary Coverage     Payor Plan Insurance Group Employer/Plan Group    [employer] [id number]     Payor Plan Address Payor Plan Phone Number Payor Plan Fax Number Effective Dates    [address] [phone]  6/1/2021 - None Entered    [place] [id number]       Subscriber Name Subscriber Birth Date Member ID       SABAS FLANAGAN 1960 [id number]                 Emergency Contacts      (Rel.) Home Phone Work Phone Mobile Phone    WARREN FLANAGAN (Spouse) -- -- [phone]        LOC:Acute Adult-Infection: COVID-19 by Tracy White       In Progress (Acute Met): Reviewed on 9/7/2021 by Tracy White       Created Using Review Status Review Entered   [employer]ure[employer]  In Primary 9/7/2021 11:29       Criteria Set Name - Subset   LOC:Acute Adult-Infection: COVID-19      Criteria Review   REVIEW SUMMARY     Patient: SABAS FLANAGAN  Review Number: [id number]  Review Status: In Primary     Condition Specific: Yes        OUTCOMES  Outcome Type: Primary     Benchmark Length of Stay: 6.0 days (IQ, COVID-19)        REVIEW " DETAILS     Product: LOC:Acute Adult  Subset: Infection: COVID-19      (Symptom or finding within 24h)         (Excludes PO medications unless noted)          [X] Select Day, One:              [X] Episode Day 1, One:                  [X] ACUTE, One:                      [X] COVID-19 test performed and, >= One:                          [X] COVID-19 test positive or high clinical suspicion and, >= One:                              [X] Pneumonia confirmed by imaging (CXR or CT) and, Both:                                  [X] Finding, >= One:                                      [X] O2 sat < 90%(0.90) and < baseline                                  [X] Intervention, >= One:                                      [X] COVID-19 targeted therapy (including approved therapy, emergency use authorization, or clinical trials)     Version: myBarrister® 2020  myBarrister® and myBarrister®  © 2020 [employer] and/or one of its subsidiaries.  All Rights Reserved.  CPT only © 2019 American Medical Association.  All Rights Reserved.         Problem List         Codes Noted - Resolved       Hospital    COVID-19 ICD-10-CM: U07.1  ICD-9-CM: 079.89 9/6/2021 - Present    Acute hypoxemic respiratory failure due to COVID-19 (CMS/[facility]) ICD-10-CM: U07.1, J96.01  ICD-9-CM: 518.81, 079.89, 799.02 9/6/2021 - Present    COVID-19 virus detected ICD-10-CM: U07.1  ICD-9-CM: 079.89 9/6/2021 - Present       Non-Hospital    Hyperlipidemia ICD-10-CM: E78.5  ICD-9-CM: 272.4 7/29/2021 - Present    Hypertension ICD-10-CM: I10  ICD-9-CM: 401.9 7/29/2021 - Present    DIMAS (generalized anxiety disorder) ICD-10-CM: F41.1  ICD-9-CM: 300.02 3/31/2020 - Present    Gout ICD-10-CM: M10.9  ICD-9-CM: 274.9 3/31/2020 - Present    Stress ICD-10-CM: F43.9  ICD-9-CM: V62.89 3/31/2020 - Present             History & Physical      Evangelina Radford MD at 09/06/21 2010           [facility] HISTORY AND PHYSICAL  Date: 9/6/2021   Patient Name:  Sabas Page  : 1960  MRN: [id number]  Primary Care Physician:  Daksha Arcos APRN  Date of admission: 2021    Subjective   Subjective     Chief Complaint: Dyspnea, weakness, diarrhea, nausea and vomiting    HPI:    Sabas Page is a 61 y.o. male with history of hypertension hyperlipidemia and gout who presented to the ED with diagnosis of COVID-19 10 days ago who is becoming more short of breath associated with weakness diarrhea nausea and vomiting.  Patient states that he has been having diarrhea 3-5 times a day for the past couple of days.  He cannot keep anything inside his body with nausea and vomiting.  Patient said he has been running fevers at home along with productive cough.  Patient was given Mucinex by his doctor but that did not help.  Patient never took his vaccine.  Patient is accompanied by his wife who also had COVID-19.    Patient was alert and oriented on exam.    His labs significant for severe CHINO creatinine of 12 bicarb of 13 potassium of 4.1.  Patient was given 2 L of fluids by the ED.    Patient has a history of kidney stones and I instructed the ED doctor to order a CT scan of the abdomen and pelvis.    Patient's says he has been having low urine output denies hematuria or abdominal pain.      Personal History     Past Medical History:   Diagnosis Date   • Broken bones    • DIMAS (generalized anxiety disorder) 2020   • Gout 2020   • HLD (hyperlipidemia)    • HTN (hypertension)    • Hyperlipemia    • Stress 2020         Past Surgical History:   Procedure Laterality Date   • KIDNEY STONE SURGERY     • KIDNEY SURGERY     • PERCUTANEOUS NEPHROSTOMY      cryoablation          Family History   Problem Relation Age of Onset   • Heart disease Mother    • Hypertension Mother         VERY HIGH BP SHE WAS 80'S WHEN PASSED AWAY   • Stroke Mother         CVA; JUST BEFORE HER DEATH   • Heart attack Father 92   • Stroke Brother 70         Social History      Socioeconomic History   • Marital status:      Spouse name: Not on file   • Number of children: Not on file   • Years of education: Not on file   • Highest education level: Not on file   Tobacco Use   • Smoking status: Never Smoker   • Smokeless tobacco: Former User     Types: Chew   Substance and Sexual Activity   • Alcohol use: Never   • Drug use: Never   • Sexual activity: Defer         Home Medications:  PARoxetine, allopurinol, benzocaine, ergocalciferol, ezetimibe, ezetimibe-simvastatin, lisinopril-hydrochlorothiazide, nebivolol, and predniSONE    Allergies:  No Known Allergies    Review of Systems   Constitutional: Positive for appetite change, chills and fever. Negative for fatigue.   HENT: Negative for postnasal drip, sinus pain, sore throat and trouble swallowing.    Eyes: Negative for pain, discharge and itching.   Respiratory: Negative for cough, chest tightness, shortness of breath and wheezing.    Cardiovascular: Negative for chest pain, palpitations and leg swelling.   Gastrointestinal: Positive for diarrhea, nausea and vomiting. Negative for abdominal pain, blood in stool and constipation.   Genitourinary: Negative for difficulty urinating, dysuria, frequency, hematuria and urgency.   Skin: Negative for color change, rash and wound.   Neurological: Negative for dizziness, tremors, seizures, syncope, facial asymmetry, speech difficulty, weakness, light-headedness, numbness and headaches.   Psychiatric/Behavioral: Negative for agitation and confusion. The patient is not nervous/anxious and is not hyperactive.         Objective   Objective     Vitals:   Temp:  [98.2 °F (36.8 °C)] 98.2 °F (36.8 °C)  Heart Rate:  [69-87] 87  Resp:  [19] 19  BP: ()/(51-96) 129/96    Physical Exam    Constitutional: Awake, alert, no acute distress   Eyes: Pupils equal, sclerae anicteric, no conjunctival injection   HENT: NCAT, mucous membranes moist   Neck: Supple, no thyromegaly, no lymphadenopathy,  trachea midline   Respiratory: Clear to auscultation bilaterally, nonlabored respirations    Cardiovascular: RRR, no murmurs, rubs, or gallops, palpable pedal pulses bilaterally   Gastrointestinal: Positive bowel sounds, soft, nontender, nondistended   Musculoskeletal: No bilateral ankle edema, no clubbing or cyanosis to extremities   Psychiatric: Appropriate affect, cooperative   Neurologic: Oriented x 3, strength symmetric in all extremities, Cranial Nerves grossly intact to confrontation, speech clear   Skin: No rashes     Result Review    Result Review:  I have personally reviewed the results from the time of this admission to 9/6/2021 20:10 EDT and agree with these findings:  [x]  Laboratory  []  Microbiology  [x]  Radiology  []  EKG/Telemetry   []  Cardiology/Vascular   []  Pathology  []  Old records  []  Other:    Imaging Results (Last 24 Hours)     Procedure Component Value Units Date/Time    XR Chest 1 View [[id number]] Collected: 09/06/21 1659     Updated: 09/06/21 1704    Narrative:      PROCEDURE: XR CHEST 1 VW     COMPARISON: [facility], , CHEST AP/PA 1 VIEW, 5/29/2012, 19:53.     INDICATIONS: SHORTNESS OF BREATH SINCE TODAY     FINDINGS:   Severe bilateral airspace disease is noted predominantly involving the lung bases.  The cardiac and   mediastinal silhouettes appear normal.  No effusion or pneumothorax is seen.     CONCLUSION:   1. Severe bilateral airspace disease consistent with pneumonia.  COVID-19 infection would be in the   differential.                  Miles Uribe M.D.         Electronically Signed and Approved By: Miles Uribe M.D. on 9/06/2021 at 16:59                            Assessment/Plan   Assessment / Plan     Assessment  #COVID-19 pneumonia  #Acute hypoxic respiratory failure  #Severe CHINO  #History of hypertension  #History of gout    Plan:   -Continue ceftriaxone azithromycin  -Decadron 8 mg daily  -Brovana DuoNebs albuterol  -Urine Legionella and strep pneumo  antigen  -Covid labs every 48 hours  -Titrate oxygen above 92%  -Admit to telemetry bed  -DVT prophylaxis  -Urine electrolytes  -Limited ultrasound of the kidneys  -CT of abdomen and pelvis pending  -1300 3 times daily bicarb according to Dr. Christianson.   -Nephrology consult  -Does not qualify to put on remdesivir because of CHINO  -Morning labs  -125 mL/h of dextrose half-normal saline  -Stop his home medications lisinopril and HCTZ along with allopurinol because of his severe CHINO.  -Twice daily renal panel      DVT prophylaxis:  No DVT prophylaxis order currently exists.    CODE STATUS:    Level Of Support Discussed With: Patient  Code Status: CPR  Medical Interventions (Level of Support Prior to Arrest): Full      Admission Status:  I believe this patient meets inpatient status.    Evangelina Radford MD               Electronically signed by Evangelina Radford MD at 09/06/21 2024       Emergency Department Notes    No notes of this type exist for this encounter.         Vital Signs (last day)     Date/Time   Temp   Temp src   Pulse   Resp   BP   Patient Position   SpO2    09/07/21 1201   --   --   95   24   --   --   91    09/07/21 1100   100 (37.8)   Oral   88   22   121/60   Lying   90    09/07/21 0730   99 (37.2)   Oral   92   22   151/64   Lying   90    09/07/21 0611   99.7 (37.6)   Oral   90   22   148/72   Lying   90    09/07/21 0545   --   --   93   --   --   --   93    09/07/21 0540   --   --   97   --   --   --   (!) 89    09/07/21 0535   --   --   (!) 134   --   --   --   (!) 89    09/07/21 0530   --   --   93   --   133/72   --   91    09/07/21 0525   --   --   92   --   --   --   92    09/07/21 0520   --   --   94   --   --   --   91    09/07/21 0515   --   --   101   --   --   --   (!) 81    09/07/21 0510   --   --   90   --   --   --   90    09/07/21 0505   --   --   77   --   --   --   91 09/07/21 0500   --   --   78   --   90/47   --   93    09/07/21 0445   --   --   90   --   --   --   93 09/07/21 0437    --   --   95   --   140/72   --   90    09/07/21 0415   --   --   99   --   --   --   (!) 87    09/07/21 0400   --   --   91   --   --   --   (!) 87    09/07/21 0345   --   --   92   --   --   --   (!) 88    09/07/21 0330   --   --   95   --   108/96   --   (!) 89    09/07/21 0315   --   --   93   --   --   --   (!) 87    09/07/21 0300   --   --   100   --   --   --   (!) 80    09/07/21 0245   --   --   82   --   --   --   91    09/07/21 0230   --   --   102   --   --   --   --    09/07/21 0215   --   --   101   --   --   --   (!) 82    09/07/21 0200   --   --   91   --   --   --   (!) 86    09/07/21 0145   --   --   80   --   --   --   (!) 88    09/07/21 0130   --   --   89   --   --   --   (!) 70    09/07/21 0115   --   --   90   --   --   --   (!) 82    09/07/21 0100   --   --   78   --   116/68   --   91    09/07/21 0045   --   --   93   --   --   --   91    09/07/21 0030   --   --   78   --   121/72   --   (!) 88    09/07/21 0015   --   --   81   --   --   --   (!) 87    09/07/21 0000   --   --   74   --   --   --   90 09/06/21 2356   --   --   --   (!) 31   --   --   --    09/06/21 2345   --   --   74   --   --   --   91    09/06/21 2330   --   --   75   --   --   --   (!) 89    09/06/21 2315   --   --   75   --   --   --   90 09/06/21 2300   --   --   67   --   --   --   93    09/06/21 2204   --   --   67   --   100/57   --   91    09/06/21 2200   --   --   68   --   --   --   90    09/06/21 2145   --   --   66   --   --   --   (!) 88    09/06/21 2130   --   --   71   --   104/59   --   (!) 88    09/06/21 2126   --   --   72   --   --   --   90    09/06/21 2115   --   --   71   --   --   --   (!) 89    09/06/21 2100   --   --   81   --   --   --   (!) 84    09/06/21 2045   --   --   71   --   --   --   92    09/06/21 2030   --   --   69   --   91/54   --   93    09/06/21 2023   --   --   71   --   --   --   91    09/06/21 2015   --   --   74   --   --   --   91    09/06/21 2000   --   --   76   --    107/66   --   91    09/06/21 1945   --   --   83   --   --   --   91    09/06/21 1930   --   --   87   --   129/96   --   (!) 83    09/06/21 1925   --   --   83   --   --   --   (!) 82    09/06/21 1915   --   --   79   --   --   --   94    09/06/21 1900   --   --   78   --   101/63   --   (!) 88    09/06/21 1845   --   --   74   --   --   --   92    09/06/21 1830   --   --   69   --   (!) 78/63   --   93    09/06/21 1815   --   --   72   --   --   --   91    09/06/21 1800   --   --   75   --   97/61   --   90    09/06/21 1745   --   --   78   --   --   --   90    09/06/21 1730   --   --   79   --   (!) 80/56   --   91    09/06/21 1715   --   --   76   --   --   --   92    09/06/21 1700   --   --   79   --   (!) 89/74   --   (!) 89    09/06/21 1645   --   --   78   --   --   --   94    09/06/21 1630   --   --   79   --   108/51   --   90    09/06/21 1604   98.2 (36.8)   Oral   80   19   94/70   Sitting   (!) 86              Oxygen Therapy (last day)     Date/Time   SpO2   Device (Oxygen Therapy)   Flow (L/min)   Oxygen Concentration (%)   ETCO2 (mmHg)    09/07/21 1201   91   high-flow nasal cannula   15   --   --    09/07/21 1100   90   high-flow nasal cannula   --   --   --    09/07/21 0730   90   high-flow nasal cannula   --   --   --    09/07/21 0611   90   high-flow nasal cannula   --   --   --    09/07/21 0545   93   --   --   --   --    09/07/21 0540   (!) 89   --   --   --   --    09/07/21 0535   (!) 89   --   --   --   --    09/07/21 0530   91   --   --   --   --    09/07/21 0525   92   --   --   --   --    09/07/21 0520   91   --   --   --   --    09/07/21 0515   (!) 81   --   --   --   --    09/07/21 0510   90   --   --   --   --    09/07/21 0505   91   --   --   --   --    09/07/21 0500   93   --   --   --   --    09/07/21 0445   93   --   --   --   --    09/07/21 0430   90   --   --   --   --    09/07/21 0415   (!) 87   --   --   --   --    09/07/21 0400   (!) 87   --   --   --   --    09/07/21 0345   (!)  88   --   --   --   --    09/07/21 0330   (!) 89   --   --   --   --    09/07/21 0315   (!) 87   --   --   --   --    09/07/21 0300   (!) 80   --   --   --   --    09/07/21 0245   91   --   --   --   --    09/07/21 0215   (!) 82   --   --   --   --    09/07/21 0200   (!) 86   --   --   --   --    09/07/21 0145   (!) 88   --   --   --   --    09/07/21 0130   (!) 70   --   --   --   --    09/07/21 0115   (!) 82   --   --   --   --    09/07/21 0100   91   --   --   --   --    09/07/21 0045   91   --   --   --   --    09/07/21 0030   (!) 88   --   --   --   --    09/07/21 0015   (!) 87   --   --   --   --    09/07/21 0000   90   --   --   --   --    09/06/21 2356   --   nasal cannula   2   --   --    09/06/21 2345   91   --   --   --   --    09/06/21 2330   (!) 89   --   --   --   --    09/06/21 2315   90   --   --   --   --    09/06/21 2300   93   --   --   --   --    09/06/21 2204   91   --   --   --   --    09/06/21 2200   90   --   --   --   --    09/06/21 2145   (!) 88   --   --   --   --    09/06/21 2130   (!) 88   --   --   --   --    09/06/21 2126   90   --   --   --   --    09/06/21 2115   (!) 89   --   --   --   --    09/06/21 2100   (!) 84   --   --   --   --    09/06/21 2045   92   --   --   --   --    09/06/21 2030   93   --   --   --   --    09/06/21 2023   91   --   --   --   --    09/06/21 2015 91   --   --   --   --    09/06/21 2000 91   --   --   --   --    09/06/21 1945   91   --   --   --   --    09/06/21 1930   (!) 83   --   --   --   --    09/06/21 1925   (!) 82   --   --   --   --    09/06/21 1915   94   --   --   --   --    09/06/21 1900   (!) 88   --   --   --   --    09/06/21 1845   92   --   --   --   --    09/06/21 1830   93   --   --   --   --    09/06/21 1815   91   --   --   --   --    09/06/21 1800   90   --   --   --   --    09/06/21 1745   90   --   --   --   --    09/06/21 1730   91   --   --   --   --    09/06/21 1715   92   --   --   --   --    09/06/21 1700   (!) 89   --   --    --   --    09/06/21 1645   94   --   --   --   --    09/06/21 1630   90   --   --   --   --    09/06/21 1604   (!) 86   room air   --   --   --                Facility-Administered Medications as of 9/7/2021   Medication Dose Route Frequency Provider Last Rate Last Admin   • acetaminophen (TYLENOL) tablet 650 mg  650 mg Oral Q6H PRN Austen Whitman DO   650 mg at 09/07/21 1230   • albuterol (PROVENTIL) nebulizer solution 0.083% 2.5 mg/3mL  2.5 mg Nebulization Q6H PRN Evangelina Radford MD       • arformoterol (BROVANA) nebulizer solution 15 mcg  15 mcg Nebulization BID - RT Evangelina Radford MD   15 mcg at 09/06/21 2355   • azithromycin (ZITHROMAX) tablet 500 mg  500 mg Oral Q24H Evangelina Radford MD   500 mg at 09/07/21 0022   • cefTRIAXone (ROCEPHIN) 1 g/100 mL 0.9% NS (MBP)  1 g Intravenous Q24H Evangelina Radford MD   Stopped at 09/07/21 0103   • dexamethasone (DECADRON) injection 8 mg  8 mg Intravenous Daily Evangelina Radford MD   8 mg at 09/07/21 0843   • heparin (porcine) 5000 UNIT/ML injection 5,000 Units  5,000 Units Subcutaneous Q8H Evangelina Radford MD   5,000 Units at 09/07/21 0636   • ipratropium-albuterol (DUO-NEB) nebulizer solution 3 mL  3 mL Nebulization 4x Daily - RT Evangelina Radford MD   3 mL at 09/07/21 1201   • lactated ringers infusion  125 mL/hr Intravenous Continuous Jacques Christianson  mL/hr at 09/07/21 0925 125 mL/hr at 09/07/21 0925   • [COMPLETED] ondansetron (ZOFRAN) injection 4 mg  4 mg Intravenous Once Pta Kaplan APRN   4 mg at 09/06/21 1800   • sodium bicarbonate tablet 1,300 mg  1,300 mg Oral TID Evangelina Radford MD   1,300 mg at 09/07/21 0844   • [COMPLETED] sodium chloride 0.9 % bolus 1,000 mL  1,000 mL Intravenous Once Pat Kaplan APRN   Stopped at 09/06/21 2206   • [COMPLETED] sodium chloride 0.9 % bolus 1,000 mL  1,000 mL Intravenous Once Pat Kaplan APRN   Stopped at 09/06/21 2207   • sodium chloride 0.9 % flush 10 mL  10 mL Intravenous PRN Evangelina Radford MD            Respiratory  Therapy (last 24 hours)      Respiratory Therapy Flowsheet     Row Name 09/07/21 1201 09/07/21 1100 09/07/21 0815 09/07/21 0730 09/07/21 0611       $ Oximetry Charges    $ O2 Pt/System Assessment  yes  --  --  --  --       Oxygen Therapy    SpO2  91 %  90 %  --  90 %  90 %    Pulse Oximetry Type  Intermittent  Intermittent  --  Intermittent  Intermittent    Device (Oxygen Therapy)  high-flow nasal cannula  high-flow nasal cannula  --  high-flow nasal cannula  high-flow nasal cannula    Daily Review of Necessity (O2 Therapy)  completed  --  --  --  --    Flow (L/min)  15  --  --  --  --       Vital Signs    Temp  --  100 °F (37.8 °C)  --  99 °F (37.2 °C)  99.7 °F (37.6 °C)    Temp src  --  Oral  --  Oral  Oral    Pulse  95  88  --  92  90    Heart Rate Source  --  Monitor  --  Monitor  Monitor    Resp  24  22  --  22 22    Resp Rate Source  --  Visual  --  Visual  Visual    BP  --  121/60  --  151/64  148/72    Noninvasive MAP (mmHg)  --  75  --  86  86    BP Location  --  Right arm  --  Right arm  Right arm    BP Method  --  Automatic  --  Automatic  Automatic    Patient Position  --  Lying  --  Lying  Lying       Assessment    Respiratory WDL  --  --  WDL  --  --    Cough Type  --  --  dry  --  --       Breath Sounds Post-Respiratory Treatment    Breath Sounds Posttreatment All Fields  All Fields  --  --  --  --    Breath Sounds Posttreatment All Fields  diminished  --  --  --  --       Treatment/Therapy    Cough And Deep Breathing  --  --  done independently per patient  --  --       Aerosol Therapy (SVN)    $ Mini Neb Initial  yes  --  --  --  --    $ Mini Neb Subsequent  yes  --  --  --  --    Daily Review of Necessity (SVN)  completed  --  --  --  --    Respiratory Treatment Status (SVN)  given  --  --  --  --    Treatment Route (SVN)  mouthpiece  --  --  --  --    Patient Position (SVN)  Dalton's  --  --  --  --    Posttreatment Assessment (SVN)  breath sounds unchanged  --  --  --  --    Signs of Intolerance  (SVN)  none  --  --  --  --    Row Name 09/07/21 0545 09/07/21 0540 09/07/21 0535 09/07/21 0530 09/07/21 0525       Oxygen Therapy    SpO2  93 %  (!) 89 %  (!) 89 %  91 %  92 %       Vital Signs    Pulse  93  97  (!) 134  93  92    BP  --  --  --  133/72  --    Noninvasive MAP (mmHg)  --  --  --  89  --    Row Name 09/07/21 0520 09/07/21 0515 09/07/21 0510 09/07/21 0505 09/07/21 0500       Oxygen Therapy    SpO2  91 %  (!) 81 %  90 %  91 %  93 %       Vital Signs    Pulse  94  101  90  77  78    BP  --  --  --  --  90/47    Noninvasive MAP (mmHg)  --  --  --  --  60    Row Name 09/07/21 0445 09/07/21 0430 09/07/21 0415 09/07/21 0400 09/07/21 0345       Oxygen Therapy    SpO2  93 %  90 %  (!) 87 %  (!) 87 %  (!) 88 %       Vital Signs    Pulse  90  95  99  91  92    BP  --  140/72  --  --  --    Noninvasive MAP (mmHg)  --  92  --  --  --    [facility] Name 09/07/21 0330 09/07/21 0315 09/07/21 0300 09/07/21 0245 09/07/21 0230       Oxygen Therapy    SpO2  (!) 89 %  (!) 87 %  (!) 80 %  91 %  --       Vital Signs    Pulse  95  93  100  82  102    BP  108/96  --  --  --  --    Noninvasive MAP (mmHg)  102  --  --  --  --    [facility] Name 09/07/21 0215 09/07/21 0204 09/07/21 0200 09/07/21 0145 09/07/21 0130       Oxygen Therapy    SpO2  (!) 82 %  --  (!) 86 %  (!) 88 %  (!) 70 %    SF Ratio  --  182  --  --  --       Vital Signs    Pulse  101  --  91  80  89    [facility] Name 09/07/21 0115 09/07/21 0100 09/07/21 0045 09/07/21 0030 09/07/21 0015       Oxygen Therapy    SpO2  (!) 82 %  91 %  91 %  (!) 88 %  (!) 87 %       Vital Signs    Pulse  90  78  93  78  81    BP  --  116/68  --  121/72  --    Noninvasive MAP (mmHg)  --  82  --  87  --    Row Name 09/07/21 0000 09/06/21 2356 09/06/21 2346 09/06/21 2336 09/06/21 2314       $ Oximetry Charges    $ O2 Pt/System Assessment  --  yes  --  --  --       Oxygen Therapy    SpO2  90 %  --  91 %  (!) 89 %  90 %    Pulse Oximetry Type  --  Continuous  --  --  --    Device (Oxygen Therapy)  --  nasal  cannula  --  --  --    Daily Review of Necessity (O2 Therapy)  --  completed  --  --  --    Flow (L/min)  --  2  --  --  --       Vital Signs    Pulse  74  --  74  75  75    Resp  --  (!) 31  --  --  --       Assessment    Respiratory WDL  --  WDL  --  --  --       Breath Sounds Post-Respiratory Treatment    Breath Sounds Posttreatment All Fields  --  All Fields  --  --  --    Breath Sounds Posttreatment All Fields  --  Anterior:;Lateral:;diminished  --  --  --       Aerosol Therapy (SVN)    $ Mini Neb Initial  --  yes  --  --  --    Daily Review of Necessity (SVN)  --  completed  --  --  --    Respiratory Treatment Status (SVN)  --  given  --  --  --    Treatment Route (SVN)  --  mouthpiece  --  --  --    Patient Position (SVN)  --  semi-Dalton's  --  --  --    Posttreatment Assessment (SVN)  --  breath sounds unchanged  --  --  --    Signs of Intolerance (SVN)  --  none  --  --  --    Row Name 09/06/21 2300 09/06/21 2204 09/06/21 2200 09/06/21 2145 09/06/21 2130       Oxygen Therapy    SpO2  93 %  91 %  90 %  (!) 88 %  (!) 88 %       Vital Signs    Pulse  67  67  68  66  71    BP  --  100/57  --  --  104/59    Noninvasive MAP (mmHg)  --  71  --  --  73    Row Name 09/06/21 2126 09/06/21 2115 09/06/21 2100 09/06/21 2045 09/06/21 2030       Oxygen Therapy    SpO2  90 %  (!) 89 %  (!) 84 %  92 %  93 %       Vital Signs    Pulse  72  71  81  71  69    BP  --  --  --  --  91/54    Noninvasive MAP (mmHg)  --  --  --  --  66    Row Name 09/06/21 2023 09/06/21 2015 09/06/21 2000 09/06/21 1945 09/06/21 1930       Oxygen Therapy    SpO2  91 %  91 %  91 %  91 %  (!) 83 %       Vital Signs    Pulse  71  74  76  83  87    BP  --  --  107/66  --  129/96    Noninvasive MAP (mmHg)  --  --  77  --  109    Row Name 09/06/21 1925 09/06/21 1915 09/06/21 1900 09/06/21 1845 09/06/21 1830       Oxygen Therapy    SpO2  (!) 82 %  94 %  (!) 88 %  92 %  93 %       Vital Signs    Pulse  83  79  78  74  69    BP  --  --  101/63  --  (!) 78/63     Noninvasive MAP (mmHg)  --  --  74  --  68    Row Name 09/06/21 1815 09/06/21 1800 09/06/21 1745 09/06/21 17:40:51 09/06/21 1730       Oxygen Therapy    SpO2  91 %  90 %  90 %  --  91 %       Vital Signs    Pulse  72  75  78  --  79    BP  --  97/61  --  --  (!) 80/56    Noninvasive MAP (mmHg)  --  72  --  --  65       Assessment    Cough Type  --  --  --  dry  --    Row Name 09/06/21 1715 09/06/21 1700 09/06/21 1645 09/06/21 1630 09/06/21 1604       Oxygen Therapy    SpO2  92 %  (!) 89 %  94 %  90 %  (!) 86 %    Device (Oxygen Therapy)  --  --  --  --  room air       Vital Signs    Temp  --  --  --  --  98.2 °F (36.8 °C)    Temp src  --  --  --  --  Oral    Pulse  76  79  78  79  80    Heart Rate Source  --  --  --  --  Monitor    Resp  --  --  --  --  19    Resp Rate Source  --  --  --  --  Visual    BP  --  (!) 89/74  --  108/51  94/70    Noninvasive MAP (mmHg)  --  81  --  61  --    BP Location  --  --  --  --  Left arm    BP Method  --  --  --  --  Automatic    Patient Position  --  --  --  --  Sitting        Physician Progress Notes (last 24 hours) (Notes from 09/06/21 1238 through 09/07/21 1238)    No notes of this type exist for this encounter.            Consult Notes (last 24 hours) (Notes from 09/06/21 1238 through 09/07/21 1238)      Jacques Christianson MD at 09/07/21 0841            Patient Care Team:  Daksha Arcos APRN as PCP - General (Nurse Practitioner)    Chief complaint: CHINO/CKD3    Subjective     History of Present Illness   62yo with h/o HTN and CKD with baseline creatinine of 1.6 earlier this year.  He had been diagnosed with COVID 19 about 10 days ago and presented to ER with increased dyspnea and weakness.  He also had increased n/v and diarrhea.  He had creatinine of 12.3 and BUN of 139 on presentation.  He had been on HCTZ and lisinopril at home along with prednisone.  He had noted hypotension in ER and was given several liters of fluid with improvement.     Review of Systems    Constitutional: Positive for fatigue and fever. Negative for chills.   Respiratory: Positive for cough and shortness of breath.    Cardiovascular: Negative for chest pain and leg swelling.   Gastrointestinal: Negative for abdominal pain.   Genitourinary: Negative for dysuria.        Past Medical History:   Diagnosis Date   • Broken bones    • Elevated cholesterol    • DIMAS (generalized anxiety disorder) 03/31/2020   • Gout 03/31/2020   • HLD (hyperlipidemia)    • HTN (hypertension)    • Hyperlipemia    • Stress 03/31/2020   ,   Past Surgical History:   Procedure Laterality Date   • KIDNEY STONE SURGERY     • KIDNEY SURGERY     • PERCUTANEOUS NEPHROSTOMY      cryoablation    ,   Family History   Problem Relation Age of Onset   • Heart disease Mother    • Hypertension Mother         VERY HIGH BP SHE WAS 80'S WHEN PASSED AWAY   • Stroke Mother         CVA; JUST BEFORE HER DEATH   • Heart attack Father 92   • Stroke Brother 70   ,   Social History     Socioeconomic History   • Marital status:      Spouse name: Not on file   • Number of children: Not on file   • Years of education: Not on file   • Highest education level: Not on file   Tobacco Use   • Smoking status: Never Smoker   • Smokeless tobacco: Former User     Types: Chew   Substance and Sexual Activity   • Alcohol use: Never   • Drug use: Never   • Sexual activity: Defer     E-cigarette/Vaping     E-cigarette/Vaping Substances     E-cigarette/Vaping Devices       ,   Medications Prior to Admission   Medication Sig Dispense Refill Last Dose   • allopurinol (Zyloprim) 300 MG tablet Take 1 tablet by mouth Daily. 60 tablet 0    • benzocaine (CHLORASEPTIC) 15 MG lozenge lozenge Take  by mouth Every 2 (Two) Hours As Needed.      • Bystolic 20 MG tablet       • ergocalciferol (ERGOCALCIFEROL) 1.25 MG (34926 UT) capsule Take 50,000 Units by mouth.      • ezetimibe (ZETIA) 10 MG tablet Take 10 mg by mouth Daily.      • ezetimibe-simvastatin (Vytorin) 10-20 MG per  tablet Vytorin 10-20 10-20 mg oral tablet take 1 tablet by oral route QHS   Suspended      • lisinopril-hydrochlorothiazide (PRINZIDE,ZESTORETIC) 20-25 MG per tablet Take 1 tablet by mouth Daily.      • PARoxetine (PAXIL) 10 MG tablet Take 10 mg by mouth Daily.      • predniSONE (DELTASONE) 20 MG tablet Take 1 tablet by mouth Daily. 1 tab po bid for 5 days then daily for 5 days 15 tablet 0    , Scheduled Meds:  arformoterol, 15 mcg, Nebulization, BID - RT  azithromycin, 500 mg, Oral, Q24H  cefTRIAXone, 1 g, Intravenous, Q24H  dexamethasone, 8 mg, Intravenous, Daily  heparin (porcine), 5,000 Units, Subcutaneous, Q8H  ipratropium-albuterol, 3 mL, Nebulization, 4x Daily - RT  sodium bicarbonate, 1,300 mg, Oral, TID    , Continuous Infusions:  dextrose 5 % and sodium chloride 0.45 %, 125 mL/hr, Last Rate: 125 mL/hr (09/07/21 0103)    , PRN Meds:  •  albuterol  •  sodium chloride and Allergies:  Patient has no known allergies.    Objective     Vital Signs  Temp:  [98.2 °F (36.8 °C)-99.7 °F (37.6 °C)] 99 °F (37.2 °C)  Heart Rate:  [] 92  Resp:  [19-31] 22  BP: ()/(47-96) 151/64    No intake/output data recorded.  I/O last 3 completed shifts:  In: 4200 [I.V.:2000; IV Piggyback:2200]  Out: -     Physical Exam  HENT:      Head: Normocephalic.      Mouth/Throat:      Mouth: Mucous membranes are moist.   Eyes:      Pupils: Pupils are equal, round, and reactive to light.   Cardiovascular:      Rate and Rhythm: Normal rate and regular rhythm.      Pulses: Normal pulses.   Pulmonary:      Effort: Pulmonary effort is normal.      Breath sounds: Rhonchi present.   Abdominal:      General: Abdomen is flat.      Palpations: Abdomen is soft.   Musculoskeletal:         General: Normal range of motion.      Cervical back: Normal range of motion.   Skin:     General: Skin is warm and dry.   Neurological:      Mental Status: He is alert.         Results Review:    I reviewed the patient's new clinical results.    WBC WBC    Date Value Ref Range Status   09/07/2021 7.38 3.40 - 10.80 10*3/mm3 Final   09/06/2021 8.60 3.40 - 10.80 10*3/mm3 Final      HGB Hemoglobin   Date Value Ref Range Status   09/07/2021 12.2 (L) 13.0 - 17.7 g/dL Final   09/06/2021 13.5 13.0 - 17.7 g/dL Final      HCT Hematocrit   Date Value Ref Range Status   09/07/2021 35.8 (L) 37.5 - 51.0 % Final   09/06/2021 38.9 37.5 - 51.0 % Final      Platlets No results found for: LABPLAT   MCV MCV   Date Value Ref Range Status   09/07/2021 85.4 79.0 - 97.0 fL Final   09/06/2021 83.3 79.0 - 97.0 fL Final          Sodium Sodium   Date Value Ref Range Status   09/07/2021 132 (L) 136 - 145 mmol/L Final   09/06/2021 131 (L) 136 - 145 mmol/L Final      Potassium Potassium   Date Value Ref Range Status   09/07/2021 4.1 3.5 - 5.2 mmol/L Final   09/06/2021 4.4 3.5 - 5.2 mmol/L Final      Chloride Chloride   Date Value Ref Range Status   09/07/2021 98 98 - 107 mmol/L Final   09/06/2021 87 (L) 98 - 107 mmol/L Final      CO2 CO2   Date Value Ref Range Status   09/07/2021 13.0 (L) 22.0 - 29.0 mmol/L Final   09/06/2021 13.4 (L) 22.0 - 29.0 mmol/L Final      BUN BUN   Date Value Ref Range Status   09/07/2021 126 (H) 8 - 23 mg/dL Final   09/06/2021 139 (H) 8 - 23 mg/dL Final      Creatinine Creatinine   Date Value Ref Range Status   09/07/2021 9.51 (H) 0.76 - 1.27 mg/dL Final   09/06/2021 12.38 (H) 0.76 - 1.27 mg/dL Final      Calcium Calcium   Date Value Ref Range Status   09/07/2021 8.4 (L) 8.6 - 10.5 mg/dL Final   09/06/2021 9.7 8.6 - 10.5 mg/dL Final      PO4 No results found for: CAPO4   Albumin Albumin   Date Value Ref Range Status   09/07/2021 3.00 (L) 3.50 - 5.20 g/dL Final   09/06/2021 3.70 3.50 - 5.20 g/dL Final      Magnesium No results found for: MG   Uric Acid No results found for: URICACID         Assessment/Plan       COVID-19    Acute hypoxemic respiratory failure due to COVID-19 (CMS/HCC)    COVID-19 virus detected      Assessment & Plan   CHINO/CKD-  He had CHINO due to  hypovolemia/hypotension with significant improvement of creatinine to 9.5 today.  Urine studies ordered, pending at this time.  CT with noted severe right-sided hydronephrosis and left renal atrophy.  Would rec Urology consult for evaluation.  Continue supportive care at this time.  Changing IVF's to LR.  Hyponatremia-  Mild at this time, hypovolemic.  Improved today with IVF's.  Will change to LR from 1/2NS.  Met Acidosis-  Due to uremia.  On oral bicarb, continue same.  Covid PNA-  On steroids  H/o HTN-  Lisinopril/hctz on hold.  H/o Gout-    I discussed the patients findings and my recommendations with patient    Jacques Christianson MD  09/07/21  08:41 EDT            Electronically signed by Jacques Christianson MD at 09/07/21 0856       Respiratory Therapy Notes (last 24 hours) (Notes from 09/06/21 1238 through 09/07/21 1238)    No notes exist for this encounter.

## 2021-09-07 NOTE — PROGRESS NOTES
[facility]   Hospitalist Progress Note  Date: 2021  Patient Name: Sabas Page  : 1960  MRN: [id number]  Date of admission: 2021      Subjective   Subjective     Chief Complaint: Covid pneumonia weakness diarrhea nausea    Summary: 61 y.o. male with history of hypertension hyperlipidemia and gout who presented to the ED with diagnosis of COVID-19 10 days ago who is becoming more short of breath associated with weakness diarrhea nausea and vomiting.  Patient states that he has been having diarrhea 3-5 times a day for the past couple of days.  He cannot keep anything inside his body with nausea and vomiting.  Patient said he has been running fevers at home along with productive cough.  Patient was given Mucinex by his doctor but that did not help.  Patient never took his vaccine.  Patient is accompanied by his wife who also had COVID-19.       His labs significant for severe CHINO creatinine of 12 bicarb of 13 potassium of 4.1.  Patient was given 2 L of fluids by the ED. hospital service was asked for admission    Interval Followup: Patient states he is feeling a little bit better today.  He remains on 15 L high flow cannula.  Patient's creatinine is down to 8.2 today with IV fluid hydration.  Patient denies any nausea vomiting abdominal pain.  Patient is anxious to start eating.  His potassium is normal range.  Renal ultrasound showsModerate right-sided hydronephrosis which could partly relate to narrowing at the right UPJ.    Right ureter not seen on ultrasound.  3. Renal cortical thinning and/or scarring could be present.  4. Left-sided nephrolith  better seen on previous CT.    Review of Systems  History obtained from the patient  General ROS:  Negative for - chills, fever, malaise, or night sweats  Psychological ROS: negative for - anxiety, depression, hallucinations, or mood swings  Ophthalmic ROS: negative for - blurry vision, double vision, or itchy eyes  ENT ROS: negative for -  headaches, nasal congestion, sneezing, or sore throat  Hematological and Lymphatic ROS: negative for - bleeding problems, bruising, or jaundice  Endocrine ROS: negative for - malaise/lethargy, polydipsia/polyuria, or temperature intolerance  Respiratory ROS: Positive for shortness of breath and cough  Cardiovascular ROS: negative for - chest pain, dyspnea on exertion, edema, palpitations, or paroxysmal nocturnal dyspnea  Gastrointestinal ROS: negative for - abdominal pain, constipation, diarrhea, heartburn, hematemesis, or nausea/vomiting  Genito-Urinary ROS: negative for - change in urinary stream, hematuria, incontinence, or urinary frequency/urgency  Musculoskeletal ROS: negative for - joint stiffness, joint swelling, muscle pain, or muscular weakness  Neurological ROS: negative for - confusion, dizziness, gait disturbance, headaches, impaired coordination/balance, memory loss, numbness/tingling, seizures, or visual changes  Dermatological ROS: negative for dry skin and rash       Objective   Objective     Vitals:   Temp:  [97.6 °F (36.4 °C)-100 °F (37.8 °C)] 97.6 °F (36.4 °C)  Heart Rate:  [] 69  Resp:  [22-31] 24  BP: ()/(47-96) 114/64  Flow (L/min):  [2-15] 15  Physical Exam    Constitutional: Awake, alert, no acute distress.  Lying in bed   Eyes: Pupils equal, sclerae anicteric, no conjunctival injection   HENT: NCAT, mucous membranes moist   Neck: Supple,   Respiratory: Clear to auscultation bilaterally, nonlabored respirations.  No wheezing rales or rhonchi   Cardiovascular: RRR, no murmurs,   Gastrointestinal: Positive bowel sounds, soft, nontender, nondistended   Musculoskeletal: No bilateral ankle edema, no clubbing or cyanosis to extremities   Psychiatric: Appropriate affect, cooperative   Neurologic: Oriented x 3, strength symmetric in all extremities, Cranial Nerves grossly intact to confrontation, speech clear   Skin: No rashes     Result Review    Result Review:  I have personally  reviewed the results from the time of this admission to 9/7/2021 17:00 EDT and agree with these findings:  [x]  Laboratory  [x]  Microbiology  [x]  Radiology  [x]  EKG/Telemetry   [x]  Cardiology/Vascular   []  Pathology  []  Old records  []  Other:    Assessment/Plan   Assessment / Plan     Assessment:  • SARS-CoV-2 COVID-19 pneumonia  • Acute hypoxemic respiratory failure from COVID-19 pneumonia requiring high flow oxygen  • Severe chronic kidney injury with a creatinine of 12  • Hypertension  • Gout   • Severe right-sided hydronephrosis    PLAN:   Continue patient on ceftriaxone and azithromycin given patient's elevated procalcitonin  Continue patient on Decadron a day 2 out of 10  Urinary antigens have not been collected  Continue to wean oxygen  Ultrasound of the kidneys reviewed.  CT of the abdomen reviewed  Nephrology consulted  Continue patient on bicarb 3 times daily  Patient does not qualify for or them severe secondary to acute kidney injury  Continue patient on IV fluids D5 half-normal saline at 125 an hour  Continue to hold his nephrotoxic medication including his lisinopril and hydrochlorothiazide along with allopurinol  Will obtain urology consult given his severe right-sided hydronephrosis       Discussed plan with RN.    DVT prophylaxis:  Medical DVT prophylaxis orders are present.    CODE STATUS:   Level Of Support Discussed With: Patient  Code Status: CPR  Medical Interventions (Level of Support Prior to Arrest): Full        Electronically signed by Austen Whitman DO, 09/07/21, 5:00 PM EDT.

## 2021-09-07 NOTE — H&P
[facility]  Date[facility]   Patient Name: Sabas Page  : 1960  MRN: [id number]  Primary Care Physician:  Daksha Arcos APRN  Date of admission: 2021    Subjective   Subjective     Chief Complaint: Dyspnea, weakness, diarrhea, nausea and vomiting    HPI:    Sabas Page is a 61 y.o. male with history of hypertension hyperlipidemia and gout who presented to the ED with diagnosis of COVID-19 10 days ago who is becoming more short of breath associated with weakness diarrhea nausea and vomiting.  Patient states that he has been having diarrhea 3-5 times a day for the past couple of days.  He cannot keep anything inside his body with nausea and vomiting.  Patient said he has been running fevers at home along with productive cough.  Patient was given Mucinex by his doctor but that did not help.  Patient never took his vaccine.  Patient is accompanied by his wife who also had COVID-19.    Patient was alert and oriented on exam.    His labs significant for severe CHINO creatinine of 12 bicarb of 13 potassium of 4.1.  Patient was given 2 L of fluids by the ED.    Patient has a history of kidney stones and I instructed the ED doctor to order a CT scan of the abdomen and pelvis.    Patient's says he has been having low urine output denies hematuria or abdominal pain.      Personal History     Past Medical History:   Diagnosis Date   • Broken bones    • DIMAS (generalized anxiety disorder) 2020   • Gout 2020   • HLD (hyperlipidemia)    • HTN (hypertension)    • Hyperlipemia    • Stress 2020         Past Surgical History:   Procedure Laterality Date   • KIDNEY STONE SURGERY     • KIDNEY SURGERY     • PERCUTANEOUS NEPHROSTOMY      cryoablation          Family History   Problem Relation Age of Onset   • Heart disease Mother    • Hypertension Mother         VERY HIGH BP SHE WAS 80'S WHEN PASSED AWAY   • Stroke Mother         CVA; JUST BEFORE HER  DEATH   • Heart attack Father 92   • Stroke Brother 70         Social History     Socioeconomic History   • Marital status:      Spouse name: Not on file   • Number of children: Not on file   • Years of education: Not on file   • Highest education level: Not on file   Tobacco Use   • Smoking status: Never Smoker   • Smokeless tobacco: Former User     Types: Chew   Substance and Sexual Activity   • Alcohol use: Never   • Drug use: Never   • Sexual activity: Defer         Home Medications:  PARoxetine, allopurinol, benzocaine, ergocalciferol, ezetimibe, ezetimibe-simvastatin, lisinopril-hydrochlorothiazide, nebivolol, and predniSONE    Allergies:  No Known Allergies    Review of Systems   Constitutional: Positive for appetite change, chills and fever. Negative for fatigue.   HENT: Negative for postnasal drip, sinus pain, sore throat and trouble swallowing.    Eyes: Negative for pain, discharge and itching.   Respiratory: Negative for cough, chest tightness, shortness of breath and wheezing.    Cardiovascular: Negative for chest pain, palpitations and leg swelling.   Gastrointestinal: Positive for diarrhea, nausea and vomiting. Negative for abdominal pain, blood in stool and constipation.   Genitourinary: Negative for difficulty urinating, dysuria, frequency, hematuria and urgency.   Skin: Negative for color change, rash and wound.   Neurological: Negative for dizziness, tremors, seizures, syncope, facial asymmetry, speech difficulty, weakness, light-headedness, numbness and headaches.   Psychiatric/Behavioral: Negative for agitation and confusion. The patient is not nervous/anxious and is not hyperactive.         Objective   Objective     Vitals:   Temp:  [98.2 °F (36.8 °C)] 98.2 °F (36.8 °C)  Heart Rate:  [69-87] 87  Resp:  [19] 19  BP: ()/(51-96) 129/96    Physical Exam    Constitutional: Awake, alert, no acute distress   Eyes: Pupils equal, sclerae anicteric, no conjunctival injection   HENT: NCAT,  mucous membranes moist   Neck: Supple, no thyromegaly, no lymphadenopathy, trachea midline   Respiratory: Clear to auscultation bilaterally, nonlabored respirations    Cardiovascular: RRR, no murmurs, rubs, or gallops, palpable pedal pulses bilaterally   Gastrointestinal: Positive bowel sounds, soft, nontender, nondistended   Musculoskeletal: No bilateral ankle edema, no clubbing or cyanosis to extremities   Psychiatric: Appropriate affect, cooperative   Neurologic: Oriented x 3, strength symmetric in all extremities, Cranial Nerves grossly intact to confrontation, speech clear   Skin: No rashes     Result Review    Result Review:  I have personally reviewed the results from the time of this admission to 9/6/2021 20:10 EDT and agree with these findings:  [x]  Laboratory  []  Microbiology  [x]  Radiology  []  EKG/Telemetry   []  Cardiology/Vascular   []  Pathology  []  Old records  []  Other:    Imaging Results (Last 24 Hours)     Procedure Component Value Units Date/Time    XR Chest 1 View [[id number]] Collected: 09/06/21 1659     Updated: 09/06/21 1704    Narrative:      PROCEDURE: XR CHEST 1 VW     COMPARISON: [facility], , CHEST AP/PA 1 VIEW, 5/29/2012, 19:53.     INDICATIONS: SHORTNESS OF BREATH SINCE TODAY     FINDINGS:   Severe bilateral airspace disease is noted predominantly involving the lung bases.  The cardiac and   mediastinal silhouettes appear normal.  No effusion or pneumothorax is seen.     CONCLUSION:   1. Severe bilateral airspace disease consistent with pneumonia.  COVID-19 infection would be in the   differential.                  Miles Uribe M.D.         Electronically Signed and Approved By: Miles Uribe M.D. on 9/06/2021 at 16:59                            Assessment/Plan   Assessment / Plan     Assessment  #COVID-19 pneumonia  #Acute hypoxic respiratory failure  #Severe CHINO  #History of hypertension  #History of gout    Plan:   -Continue ceftriaxone azithromycin  -Decadron 8  mg daily  -Brovana DuoNebs albuterol  -Urine Legionella and strep pneumo antigen  -Covid labs every 48 hours  -Titrate oxygen above 92%  -Admit to telemetry bed  -DVT prophylaxis  -Urine electrolytes  -Limited ultrasound of the kidneys  -CT of abdomen and pelvis pending  -1300 3 times daily bicarb according to Dr. Christianson.   -Nephrology consult  -Does not qualify to put on remdesivir because of CHINO  -Morning labs  -125 mL/h of dextrose half-normal saline  -Stop his home medications lisinopril and HCTZ along with allopurinol because of his severe CHINO.  -Twice daily renal panel      DVT prophylaxis:  No DVT prophylaxis order currently exists.    CODE STATUS:    Level Of Support Discussed With: Patient  Code Status: CPR  Medical Interventions (Level of Support Prior to Arrest): Full      Admission Status:  I believe this patient meets inpatient status.    Evangelina Radford MD

## 2021-09-08 NOTE — SIGNIFICANT NOTE
09/08/21 1105   Coping/Psychosocial   Observed Emotional State calm   Verbalized Emotional State hopefulness   Trust Relationship/Rapport empathic listening provided   Involvement in Care at bedside;interacting with patient   Spiritual Care   Spiritual Care Visit Type initial   Spiritual Care Source  initiative   Receptivity to Spiritual Care visit welcomed   Spiritual Care Interventions supportive conversation provided   Response to Spiritual Care receptive of support;thanks expressed   Use of Spiritual Resources non-Confucianism use of spiritual care   Spiritual Care Follow-Up follow-up, none required as presently assessed

## 2021-09-08 NOTE — ANESTHESIA PREPROCEDURE EVALUATION
Anesthesia Evaluation     Patient summary reviewed and Nursing notes reviewed   no history of anesthetic complications:  NPO Solid Status: > 8 hours  NPO Liquid Status: > 2 hours           Airway   Mallampati: III  TM distance: >3 FB  Neck ROM: full  No difficulty expected  Dental    (+) poor dentition    Pulmonary - negative pulmonary ROS and normal exam    breath sounds clear to auscultation  Cardiovascular - normal exam  Exercise tolerance: good (4-7 METS)    Patient on routine beta blocker and Beta blocker given within 24 hours of surgery  Rhythm: regular    (+) hypertension, hyperlipidemia,       Neuro/Psych  (+) psychiatric history,     GI/Hepatic/Renal/Endo    (+)   renal disease ARF,     Musculoskeletal (-) negative ROS    Abdominal    Substance History - negative use     OB/GYN negative ob/gyn ROS         Other - negative ROS       ROS/Med Hx Other: Pt is Covid Positive      Phys Exam Other: Pt with horrible dentition, beard could be difficult mask, winded easily during interview     Results for MOUNA FLANAGAN (MRN [id number]) as of 9/8/2021 15:26   Ref. Range 9/8/2021 14:14   Glucose Latest Ref Range: 65 - 99 mg/dL 240 (H)   Sodium Latest Ref Range: 136 - 145 mmol/L 140   Potassium Latest Ref Range: 3.5 - 5.2 mmol/L 4.2   CO2 Latest Ref Range: 22.0 - 29.0 mmol/L 15.4 (L)   Chloride Latest Ref Range: 98 - 107 mmol/L 107   Anion Gap Latest Ref Range: 5.0 - 15.0 mmol/L 17.6 (H)   Creatinine Latest Ref Range: 0.76 - 1.27 mg/dL 3.77 (H)   BUN Latest Ref Range: 8 - 23 mg/dL 92 (H)   BUN/Creatinine Ratio Latest Ref Range: 7.0 - 25.0  24.4   Calcium Latest Ref Range: 8.6 - 10.5 mg/dL 9.3   eGFR Non  Am Latest Ref Range: >60 mL/min/1.73 16 (L)   Albumin Latest Ref Range: 3.50 - 5.20 g/dL 3.40 (L)   Phosphorus Latest Ref Range: 2.5 - 4.5 mg/dL 4.3     Results for MOUNA FLANAGAN (MRN [id number]) as of 9/8/2021 15:26   Ref. Range 9/7/2021 03:59   D-Dimer, Quant Latest Ref Range: 0.00 - 0.59 mg/L  (FEU) 1.26 (H)   PTT Latest Ref Range: 22.2 - 34.2 seconds 26.3     Results for MOUNA FLANAGAN (MRN [id number]) as of 9/8/2021 15:26   Ref. Range 9/8/2021 05:26   WBC Latest Ref Range: 3.40 - 10.80 10*3/mm3 8.53   RBC Latest Ref Range: 4.14 - 5.80 10*6/mm3 4.17   Hemoglobin Latest Ref Range: 13.0 - 17.7 g/dL 12.2 (L)   Hematocrit Latest Ref Range: 37.5 - 51.0 % 35.5 (L)   RDW Latest Ref Range: 12.3 - 15.4 % 14.8   MCV Latest Ref Range: 79.0 - 97.0 fL 85.1   MCH Latest Ref Range: 26.6 - 33.0 pg 29.3   MCHC Latest Ref Range: 31.5 - 35.7 g/dL 34.4   MPV Latest Ref Range: 6.0 - 12.0 fL 9.7   Platelets Latest Ref Range: 140 - 450 10*3/mm3 305   RDW-SD Latest Ref Range: 37.0 - 54.0 fl 46.2       ekg nsr rbbb           Anesthesia Plan    ASA 4 - emergent     general and MAC   (Patient understands anesthesia not responsible for dental damage.    Discussed risks benefits pt with significant hydronephrosis and worsening kidney function.)  intravenous induction     Anesthetic plan, all risks, benefits, and alternatives have been provided, discussed and informed consent has been obtained with: patient.  Use of blood products discussed with patient .   Plan discussed with CRNA.

## 2021-09-08 NOTE — PLAN OF CARE
Goal Outcome Evaluation:  Plan of Care Reviewed With: patient        Progress: no change  Outcome Summary: Patient on 12 liters highflow prior to procedure. Patient had cysto with right stent placement. When patient came back up he was sleeping heavily and unable to keep o2 sats up. Patient put on a non rebreather and continous pluse ox was ordered. Will continue to monitor.

## 2021-09-08 NOTE — CONSULTS
[facility]   Urology Consult Note    Patient Name: Sabas Page  : 1960  MRN: [id number]  Primary Care Physician:  Daksha Arcos APRN  Referring Physician: No ref. provider found  Date of admission: 2021    Inpatient Urology Consult  Consult performed by: Yesenia Roy MD  Consult ordered by: Austen Whitman DO  Reason for consult: Acute renal failure, right hydronephrosis, left renal atrophy  Assessment/Recommendations: To OR for cystoscopy and right ureteral stent placement tomorrow        Subjective   Subjective     Reason for Consult/ Chief Complaint: Acute renal failure, right hydronephrosis, left renal atrophy    Weakness - Generalized  Associated symptoms include arthralgias, chills, fatigue, a fever, nausea and vomiting.     Sabas Page is a 61 y.o. male with a history of stones.  He presented to ED with recent diagnosis of COVID-19 and worsening weakness and shortness of breath.  His creatinine was noted to be 12 up from a baseline of 1.3 in 2021.  He had a CT scan which revealed severe right hydronephrosis and left renal atrophy.  No ureteral dilation was noted and no obstructing stones were seen.  He has a history of kidney stones with surgery by Dr. Valdez in 2018.  He denies flank pain.  He has had nausea and vomiting.     Review of Systems   Constitutional: Positive for activity change, chills, fatigue and fever.   Respiratory: Positive for chest tightness and shortness of breath.    Gastrointestinal: Positive for diarrhea, nausea and vomiting.   Genitourinary: Negative for difficulty urinating and flank pain.   Musculoskeletal: Positive for arthralgias.        Personal History     Past Medical History:   Diagnosis Date   • Broken bones    • Elevated cholesterol    • DIMAS (generalized anxiety disorder) 2020   • Gout 2020   • HLD (hyperlipidemia)    • HTN (hypertension)    • Hyperlipemia    • Stress 2020       Past Surgical History:    Procedure Laterality Date   • KIDNEY STONE SURGERY     • KIDNEY SURGERY     • PERCUTANEOUS NEPHROSTOMY      cryoablation        Family History: family history includes Heart attack (age of onset: 92) in his father; Heart disease in his mother; Hypertension in his mother; Stroke in his mother; Stroke (age of onset: 70) in his brother. Otherwise pertinent FHx was reviewed and not pertinent to current issue.    Social History:  reports that he has never smoked. He has quit using smokeless tobacco.  His smokeless tobacco use included chew. He reports that he does not drink alcohol and does not use drugs.    Home Medications:   PARoxetine, allopurinol, ergocalciferol, ezetimibe, febuxostat, lisinopril-hydrochlorothiazide, nebivolol, and traZODone    Allergies:  No Known Allergies    Objective    Objective     Vitals:  Temp:  [97.6 °F (36.4 °C)-100 °F (37.8 °C)] 97.7 °F (36.5 °C)  Heart Rate:  [] 74  Resp:  [20-31] 20  BP: ()/(47-96) 117/64  Flow (L/min):  [2-15] 15    Physical Exam  Cardiovascular:      Rate and Rhythm: Normal rate.   Pulmonary:      Effort: Pulmonary effort is normal.   Neurological:      Mental Status: He is alert.   Psychiatric:         Mood and Affect: Mood normal.         Behavior: Behavior normal.         Result Review    Result Review:  I have personally reviewed the results from the time of this admission to 9/7/2021 22:31 EDT and agree with these findings:  [x]  Laboratory  [x]  Microbiology  [x]  Radiology  []  EKG/Telemetry   []  Cardiology/Vascular   []  Pathology  [x]  Old records  []  Other:    Assessment/Plan   Assessment / Plan       Active Hospital Problems:  Active Hospital Problems    Diagnosis    • COVID-19    • Acute hypoxemic respiratory failure due to COVID-19 (CMS/HCC)    • COVID-19 virus detected    • CHINO (acute kidney injury) (CMS/HCC)      Added automatically from request for surgery [id number]     • Hydronephrosis      Added automatically from request for surgery  [id number]       Plan:  I discussed his acute renal failure and right hydronephrosis with the patient.  His creatinine is improved but given his persistent right hydronephrosis and left renal atrophy and creatinine today of 8, I will take the patient to the OR tomorrow for cystoscopy and right ureteral stent placement.  Risks and benefits discussed with patient who is agreeable to proceed.  NPO after midnight.        Electronically signed by Yesenia Roy MD, 09/07/21, 10:31 PM EDT.

## 2021-09-08 NOTE — PROGRESS NOTES
[facility]   Hospitalist Progress Note  Date: 2021  Patient Name: Sabas Page  : 1960  MRN: [id number]  Date of admission: 2021      Subjective   Subjective     Chief Complaint: Covid pneumonia weakness diarrhea nausea    Summary: 61 y.o. male with history of hypertension hyperlipidemia and gout who presented to the ED with diagnosis of COVID-19 10 days ago who is becoming more short of breath associated with weakness diarrhea nausea and vomiting.  Patient states that he has been having diarrhea 3-5 times a day for the past couple of days.  He cannot keep anything inside his body with nausea and vomiting.  Patient said he has been running fevers at home along with productive cough.  Patient was given Mucinex by his doctor but that did not help.  Patient never took his vaccine.  Patient is accompanied by his wife who also had COVID-19.       His labs significant for severe CHINO creatinine of 12 bicarb of 13 potassium of 4.1.  Patient was given 2 L of fluids by the ED. hospital service was asked for admission    Interval Followup: Patient states he is feeling a little bit better today.  His oxygen down to 11 L high flow cannula.  Patient's creatinine is down to 4 today with IV fluid hydration.  Patient denies any nausea vomiting abdominal pain.  Patient is tolerating diet his potassium is normal range.  Renal ultrasound showsModerate right-sided hydronephrosis which could partly relate to narrowing at the right UPJ.  Right ureter not seen on ultrasound.3. Renal cortical thinning and/or scarring could be present.4. Left-sided nephrolith  better seen on previous CT.  Patient is awaiting cystoscopy with stent placement by urology    Review of Systems  History obtained from the patient  General ROS:  Negative for - chills, fever, malaise, or night sweats  Psychological ROS: negative for - anxiety, depression, hallucinations, or mood swings  Ophthalmic ROS: negative for - blurry vision, double  vision, or itchy eyes  ENT ROS: negative for - headaches, nasal congestion, sneezing, or sore throat  Hematological and Lymphatic ROS: negative for - bleeding problems, bruising, or jaundice  Endocrine ROS: negative for - malaise/lethargy, polydipsia/polyuria, or temperature intolerance  Respiratory ROS: Positive for shortness of breath and cough  Cardiovascular ROS: negative for - chest pain, dyspnea on exertion, edema, palpitations, or paroxysmal nocturnal dyspnea  Gastrointestinal ROS: negative for - abdominal pain, constipation, diarrhea, heartburn, hematemesis, or nausea/vomiting  Genito-Urinary ROS: negative for - change in urinary stream, hematuria, incontinence, or urinary frequency/urgency  Musculoskeletal ROS: negative for - joint stiffness, joint swelling, muscle pain, or muscular weakness  Neurological ROS: negative for - confusion, dizziness, gait disturbance, headaches, impaired coordination/balance, memory loss, numbness/tingling, seizures, or visual changes  Dermatological ROS: negative for dry skin and rash       Objective   Objective     Vitals:   Temp:  [97.6 °F (36.4 °C)-100.4 °F (38 °C)] 100.4 °F (38 °C)  Heart Rate:  [] 90  Resp:  [16-24] 20  BP: (109-148)/(55-81) 148/79  Flow (L/min):  [11-15] 11  Physical Exam    Constitutional: Awake, alert, no acute distress.  Lying in bed   Eyes: Pupils equal, sclerae anicteric, no conjunctival injection   HENT: NCAT, mucous membranes moist   Neck: Supple,   Respiratory: Clear to auscultation bilaterally, nonlabored respirations.  No wheezing rales or rhonchi   Cardiovascular: RRR, no murmurs,   Gastrointestinal: Positive bowel sounds, soft, nontender, nondistended   Musculoskeletal: No bilateral ankle edema, no clubbing or cyanosis to extremities   Psychiatric: Appropriate affect, cooperative   Neurologic: Oriented x 3, strength symmetric in all extremities, Cranial Nerves grossly intact to confrontation, speech clear   Skin: No rashes     Result  Review    Result Review:  I have personally reviewed the results from the time of this admission to 9/8/2021 14:24 EDT and agree with these findings:  [x]  Laboratory  [x]  Microbiology  [x]  Radiology  [x]  EKG/Telemetry   [x]  Cardiology/Vascular   []  Pathology  []  Old records  []  Other:    Assessment/Plan   Assessment / Plan     Assessment:  • SARS-CoV-2 COVID-19 pneumonia  • Acute hypoxemic respiratory failure from COVID-19 pneumonia requiring high flow oxygen  • Severe chronic kidney injury with a creatinine of 12  • Hypertension  • Gout   • Severe right-sided hydronephrosis    PLAN:   Continue patient on ceftriaxone and azithromycin given patient's elevated procalcitonin  Continue patient on Decadron a day 3 out of 10  Urinary antigens have not been collected  Continue to wean oxygen now down to 11  Ultrasound of the kidneys reviewed.  CT of the abdomen reviewed  Nephrology consulted  Continue patient on bicarb 3 times daily  Patient does not qualify redemsivir secondary to acute kidney injury  Continue patient on IV fluids D5 half-normal saline at 125 an hour  Continue to hold his nephrotoxic medication including his lisinopril and hydrochlorothiazide along with allopurinol  Urology planning for cystoscopy today       Discussed plan with RN.    DVT prophylaxis:  Medical DVT prophylaxis orders are present.    CODE STATUS:   Level Of Support Discussed With: Patient  Code Status: CPR  Medical Interventions (Level of Support Prior to Arrest): Full

## 2021-09-08 NOTE — ANESTHESIA POSTPROCEDURE EVALUATION
Patient: Sabas Page    Procedure Summary     Date: 09/08/21 Room / Location: [facility] OR 06 / [facility] MAIN OR    Anesthesia Start: 1600 Anesthesia Stop: 1644    Procedure: CYSTOSCOPY URETERAL CATHETER INSERTION (Right Ureter) Diagnosis:       CHINO (acute kidney injury) (CMS/HCC)      Hydronephrosis      (CHINO (acute kidney injury) (CMS/HCC) [N17.9])      (Hydronephrosis [N13.30])    Surgeons: Yesenia Roy MD Provider: Darshan Blevins MD    Anesthesia Type: general, MAC ASA Status: 4 - Emergent          Anesthesia Type: general, MAC    Vitals  No vitals data found for the desired time range.          Post Anesthesia Care and Evaluation    Patient location during evaluation: bedside  Patient participation: complete - patient cannot participate  Level of consciousness: awake, agitated, combative, responsive to verbal stimuli and responsive to physical stimuli  Pain score: 0  Pain management: adequate  Airway patency: patent  Anesthetic complications: No anesthetic complications  PONV Status: none  Cardiovascular status: acceptable and stable  Respiratory status: face mask and spontaneous ventilation  Hydration status: acceptable    Comments: An Anesthesiologist personally participated in the most demanding procedures (including induction and emergence if applicable) in the anesthesia plan, monitored the course of anesthesia administration at frequent intervals and remained physically present and available for immediate diagnosis and treatment of emergencies.          Pt with spo2 in mid to low 80s.  Pt presented to or with spo2 in mid to high 80s.  Pt has periods of combativeness (thought secondary to severe urosepsis, tachypnea, and poor oxygenation).  Pt is covid positive.  Will discharge to previous room, but pt's condition may worsen due to covid and increased work of breathing.

## 2021-09-08 NOTE — OP NOTE
CYSTOSCOPY URETERAL CATHETER/STENT INSERTION  Procedure Report    Patient Name:  Sabas Page  YOB: 1960    Date of Surgery:  9/8/2021     Pre-op Diagnosis:   CHINO (acute kidney injury) (CMS/[facility]) [N17.9]  Hydronephrosis [N13.30]       Post-Op Diagnosis Codes:     * CHINO (acute kidney injury) (CMS/[facility]) [N17.9]     * Hydronephrosis [N13.30]    Procedure/CPT® Codes:    Procedure(s):  CYSTOSCOPY RIGHT URETERAL CATHETER INSERTION    Staff:  Surgeon(s):  Yesenia Roy MD       Anesthesia: Sedation    Estimated Blood Loss: none      Specimen:          None        Complications: None    Description of Procedure: After proper consent was obtained, patient was taken to operating room and after induction of MAC anesthesia the patient was placed in the dorsal lithotomy position and prepped and draped in the normal sterile fashion for cystoscopy.      A 22 Romansh rigid cystoscope was inserted into the bladder.  The bladder was inspected in a systemic meridian fashion using a 30 degree lens.  Both ureteral orifices were normal in appearance.      A glidewire was passed up the right ureter and over this a 4.5 Romansh 26 cm right ureteral stent was placed.  The stent was seen in good position under fluoroscopy.  The bladder was emptied and the scope was removed      Yesenia Roy MD     Date: 9/8/2021  Time: 16:35 EDT

## 2021-09-08 NOTE — PROGRESS NOTES
[facility]     Nephrology Progress Note      Patient Name: Sabas Page  : 1960  MRN: [id number]  Primary Care Physician:  Daksha Arcos APRN  Date of admission: 2021    Subjective   Subjective     Interval History:  Patient Reports feeling better.  No new complaints.  N.p.o. waiting for cystoscopy today.  Voiding well.    Review of Systems   All systems were reviewed and negative except for: What is mentioned above.    Objective   Objective     Vitals:   Temp:  [97.6 °F (36.4 °C)-100.4 °F (38 °C)] 100.4 °F (38 °C)  Heart Rate:  [] 90  Resp:  [16-24] 20  BP: (109-148)/(55-81) 148/79  Flow (L/min):  [11-15] 11  Physical Exam:   Constitutional: Awake, alert   Eyes: sclerae anicteric, no conjunctival injection   HENT: mucous membranes moist   Neck: Supple, no thyromegaly, no lymphadenopathy, trachea midline, No JVD   Respiratory: Decreased to auscultation bilaterally, nonlabored respirations    Cardiovascular: RRR, no murmurs, rubs, or gallops.   Gastrointestinal: Positive bowel sounds, soft, nontender, nondistended   Musculoskeletal: No edema, no clubbing or cyanosis   Psychiatric: Appropriate affect, cooperative   Neurologic: Oriented x 3, moving all extremities, Cranial Nerves grossly intact, speech clear   Skin: warm and dry, no rashes     Result Review    Result Reviewed:  I have personally reviewed the results from the time of this admission to 2021 14:03 EDT and agree with these findings:  [x]  Laboratory  []  Microbiology  [x]  Radiology  []  EKG/Telemetry   []  Cardiology/Vascular   []  Pathology  []  Old records  []  Other:  Lab Results   Component Value Date    GLUCOSE 198 (H) 2021    CALCIUM 9.2 2021     2021    K 4.5 2021    CO2 16.6 (L) 2021     2021     (H) 2021    CREATININE 4.61 (H) 2021    EGFRIFAFRI  2021      Comment:      <15 Indicative of kidney failure.    EGFRIFNONA 13 (L)  09/08/2021    BCR 21.9 09/08/2021    ANIONGAP 15.4 (H) 09/08/2021     Lab Results   Component Value Date    CALCIUM 9.2 09/08/2021    PHOS 4.9 (H) 09/08/2021      Results from last 7 days   Lab Units 09/08/21  0526   MAGNESIUM mg/dL 2.0      US Renal Limited    Result Date: 9/7/2021  PROCEDURE: US RENAL LIMITED  COMPARISON: [facility], CT, CT ABDOMEN PELVIS WO CONTRAST, 9/06/2021, 20:55.  INDICATIONS: CHINO  FINDINGS:  Right kidney measures 14.9 centimeters in length.  Left kidney measures 11.7 centimeters in length.  There is moderate right-sided hydronephrosis.  The exam is limited by bowel gas.  No renal lesion is evident.  Left kidney demonstrates cortical thinning.  No left-sided hydronephrosis.  Bladder not fully distended but otherwise unremarkable.  Left-sided nephrolith better seen on recent CT.  CONCLUSION:  1. The appearance is similar to previous CT scan from 9/6/2021. 2. Moderate right-sided hydronephrosis which could partly relate to narrowing at the right UPJ.  Right ureter not seen on ultrasound. 3. Renal cortical thinning and/or scarring could be present. 4. Left-sided nephrolith  better seen on previous CT. 5. The bladder is not well-distended.      RICARDO FAULKNER MD       Electronically Signed and Approved By: RICARDO FAULKNER MD on 9/07/2021 at 9:04              Most notable findings include: Creatinine down to 4.6 from 12 on admission.  Potassium 4.5 and bicarb of 16.    Assessment/Plan   Assessment / Plan       Active Hospital Problems:  Active Hospital Problems    Diagnosis    • COVID-19    • Acute hypoxemic respiratory failure due to COVID-19 (CMS/[facility])    • COVID-19 virus detected    • CHINO (acute kidney injury) (CMS/[facility])      Added automatically from request for surgery [id number]     • Hydronephrosis      Added automatically from request for surgery [id number]         Assessment and Plan:  - Acute kidney injury most likely secondary to volume depletion and obstructive uropathy.   Has right hydro and supposed to go for cystoscopy and stent placement today by urology.  Creatinine is much better with good urine output.  Continue IV fluid either LR or normal saline at 75 to 100 cc/h.  - History of chronic kidney disease stage III, baseline creatinine around 1.7.  Further work-up may be needed eventually.  - Hyponatremia with hypovolemia, resolved.  - None anion gap metabolic acidosis secondary to renal failure, on p.o. bicarb, continue to monitor for improvement.  - Covid pneumonia.  - History of hypertension, ACE inhibitor and hydrochlorothiazide are on hold for now.  - History of gout.  Discussed with nursing staff.        Electronically signed by Negrito Fulton MD, 09/08/21, 2:03 PM EDT.

## 2021-09-09 NOTE — PLAN OF CARE
Goal Outcome Evaluation:      Patient would remove airvo throughout the day. Educated importance of leaving airvo on to ensure he gets proper amount of oxygen. No other significant changes.

## 2021-09-09 NOTE — PROGRESS NOTES
[facility]     Nephrology Progress Note      Patient Name: Sabas Page  : 1960  MRN: [id number]  Primary Care Physician:  Daksha Arcos APRN  Date of admission: 2021    Subjective   Subjective     Interval History:  Patient Reports feeling better.  No new complaints.  Voiding well.    Review of Systems   All systems were reviewed and negative except for: What is mentioned above.    Objective   Objective     Vitals:   Temp:  [98.7 °F (37.1 °C)-100.8 °F (38.2 °C)] 98.8 °F (37.1 °C)  Heart Rate:  [] 86  Resp:  [12-28] 28  BP: (130-151)/() 135/76  Flow (L/min):  [10-60] 60  Physical Exam:   Constitutional: Awake, alert   Eyes: sclerae anicteric, no conjunctival injection   HENT: mucous membranes moist   Neck: Supple, no thyromegaly, no lymphadenopathy, trachea midline, No JVD   Respiratory: Decreased to auscultation bilaterally, nonlabored respirations    Cardiovascular: RRR, no murmurs, rubs, or gallops.   Gastrointestinal: Positive bowel sounds, soft, nontender, nondistended   Musculoskeletal: No edema, no clubbing or cyanosis   Psychiatric: Appropriate affect, cooperative   Neurologic: Oriented x 3, moving all extremities, Cranial Nerves grossly intact, speech clear   Skin: warm and dry, no rashes     Result Review    Result Reviewed:  I have personally reviewed the results from the time of this admission to 2021 11:02 EDT and agree with these findings:  [x]  Laboratory  []  Microbiology  [x]  Radiology  []  EKG/Telemetry   []  Cardiology/Vascular   []  Pathology  []  Old records  []  Other:  Lab Results   Component Value Date    GLUCOSE 226 (H) 2021    CALCIUM 9.1 2021     2021    K 4.9 2021    CO2 15.1 (L) 2021     (H) 2021    BUN 81 (H) 2021    CREATININE 3.06 (H) 2021    EGFRIFAFRI  2021      Comment:      <15 Indicative of kidney failure.    EGFRIFNONA 21 (L) 2021    BCR 26.5 (H) 2021     ANIONGAP 15.9 (H) 09/09/2021     Lab Results   Component Value Date    CALCIUM 9.1 09/09/2021    PHOS 3.6 09/09/2021      Results from last 7 days   Lab Units 09/09/21  0520   MAGNESIUM mg/dL 1.7      US Renal Limited    Result Date: 9/7/2021  PROCEDURE: US RENAL LIMITED  COMPARISON: [facility], CT, CT ABDOMEN PELVIS WO CONTRAST, 9/06/2021, 20:55.  INDICATIONS: CHINO  FINDINGS:  Right kidney measures 14.9 centimeters in length.  Left kidney measures 11.7 centimeters in length.  There is moderate right-sided hydronephrosis.  The exam is limited by bowel gas.  No renal lesion is evident.  Left kidney demonstrates cortical thinning.  No left-sided hydronephrosis.  Bladder not fully distended but otherwise unremarkable.  Left-sided nephrolith better seen on recent CT.  CONCLUSION:  1. The appearance is similar to previous CT scan from 9/6/2021. 2. Moderate right-sided hydronephrosis which could partly relate to narrowing at the right UPJ.  Right ureter not seen on ultrasound. 3. Renal cortical thinning and/or scarring could be present. 4. Left-sided nephrolith  better seen on previous CT. 5. The bladder is not well-distended.      RICARDO FAULKNER MD       Electronically Signed and Approved By: RICARDO FAULKNER MD on 9/07/2021 at 9:04              Most notable findings include: Creatinine down to 4.6 from 12 on admission.  Potassium 4.5 and bicarb of 16.    Assessment/Plan   Assessment / Plan       Active Hospital Problems:  Active Hospital Problems    Diagnosis    • COVID-19    • Acute hypoxemic respiratory failure due to COVID-19 (CMS/[facility])    • COVID-19 virus detected    • CHINO (acute kidney injury) (CMS/[facility])      Added automatically from request for surgery [id number]     • Hydronephrosis      Added automatically from request for surgery [id number]         Assessment and Plan:  - Acute kidney injury most likely secondary to volume depletion and obstructive uropathy.  Has right hydro s/p cystoscopy and stent  placement. Creatinine is much better to 3.0 with good urine output.  Tolerating po, will hold IVF's today.  - History of chronic kidney disease stage III, baseline creatinine around 1.7.  Further work-up may be needed eventually.  - Hyponatremia with hypovolemia, resolved.  -gap metabolic acidosis secondary to renal failure, on p.o. bicarb, continue to monitor for improvement.  - Covid pneumonia.  - History of hypertension, ACE inhibitor and hydrochlorothiazide are on hold for now.  - History of gout.

## 2021-09-09 NOTE — PROGRESS NOTES
[facility]   Hospitalist Progress Note  Date: 2021  Patient Name: Sabas Page  : 1960  MRN: [id number]  Date of admission: 2021      Subjective   Subjective     Chief Complaint: Covid pneumonia weakness diarrhea nausea    Summary: 61 y.o. male with history of hypertension hyperlipidemia and gout who presented to the ED with diagnosis of COVID-19 10 days ago who is becoming more short of breath associated with weakness diarrhea nausea and vomiting.  Patient states that he has been having diarrhea 3-5 times a day for the past couple of days.  He cannot keep anything inside his body with nausea and vomiting.  Patient said he has been running fevers at home along with productive cough.  Patient was given Mucinex by his doctor but that did not help.  Patient never took his vaccine.  Patient is accompanied by his wife who also had COVID-19.       His labs significant for severe CHINO creatinine of 12 bicarb of 13 potassium of 4.1.  Patient was given 2 L of fluids by the ED. hospital service was asked for admission    Interval Followup:   Patient continues to endorse shortness of breath.  Today patient is on Airvo he is currently at 60 L and 93%  Patient is status post cystoscopy with stent placement.  Patient's creatinine is much improved  Patient denies any nausea vomiting      Review of Systems  History obtained from the patient  General ROS:  Negative for - chills, fever, malaise, or night sweats  Psychological ROS: negative for - anxiety, depression, hallucinations, or mood swings  Ophthalmic ROS: negative for - blurry vision, double vision, or itchy eyes  ENT ROS: negative for - headaches, nasal congestion, sneezing, or sore throat  Hematological and Lymphatic ROS: negative for - bleeding problems, bruising, or jaundice  Endocrine ROS: negative for - malaise/lethargy, polydipsia/polyuria, or temperature intolerance  Respiratory ROS: Positive for shortness of breath and cough  Cardiovascular  ROS: negative for - chest pain, dyspnea on exertion, edema, palpitations, or paroxysmal nocturnal dyspnea  Gastrointestinal ROS: negative for - abdominal pain, constipation, diarrhea, heartburn, hematemesis, or nausea/vomiting  Genito-Urinary ROS: negative for - change in urinary stream, hematuria, incontinence, or urinary frequency/urgency  Musculoskeletal ROS: negative for - joint stiffness, joint swelling, muscle pain, or muscular weakness  Neurological ROS: negative for - confusion, dizziness, gait disturbance, headaches, impaired coordination/balance, memory loss, numbness/tingling, seizures, or visual changes  Dermatological ROS: negative for dry skin and rash       Objective   Objective     Vitals:   Temp:  [98.7 °F (37.1 °C)-99.3 °F (37.4 °C)] 99 °F (37.2 °C)  Heart Rate:  [] 83  Resp:  [12-28] 22  BP: (130-164)/() 164/94  Flow (L/min):  [10-60] 60  Physical Exam    Constitutional: Awake, alert, no acute distress.  Lying in bed with Airvo in place    Eyes: Pupils equal, sclerae anicteric, no conjunctival injection   HENT: NCAT, mucous membranes moist   Neck: Supple,   Respiratory: Clear to auscultation bilaterally, nonlabored respirations.  No wheezing rales or rhonchi   Cardiovascular: RRR, no murmurs,   Gastrointestinal: Positive bowel sounds, soft, nontender, nondistended   Musculoskeletal: No bilateral ankle edema, no clubbing or cyanosis to extremities   Psychiatric: Appropriate affect, cooperative   Neurologic: Oriented x 3, strength symmetric in all extremities, Cranial Nerves grossly intact to confrontation, speech clear   Skin: No rashes     Result Review    Result Review:  I have personally reviewed the results from the time of this admission to 9/9/2021 16:27 EDT and agree with these findings:  [x]  Laboratory  [x]  Microbiology  [x]  Radiology  [x]  EKG/Telemetry   [x]  Cardiology/Vascular   []  Pathology  []  Old records  []  Other:    Assessment/Plan   Assessment / Plan      Assessment:  • SARS-CoV-2 COVID-19 pneumonia  • Acute hypoxemic respiratory failure from COVID-19 pneumonia requiring high flow oxygen  • Severe chronic kidney injury with a creatinine of 12  • Hypertension  • Gout   • Severe right-sided hydronephrosis    PLAN:   Continue patient on ceftriaxone and azithromycin given patient's elevated procalcitonin.  Repeat procalcitonin is coming down  Continue patient on Decadron a day 4 out of 10.  For some reason it got discontinued however I have restarted it  Urinary antigens have not been collected  Continue to wean oxygen. Requirement has worsened and now on Airvo   Ultrasound of the kidneys reviewed.  CT of the abdomen reviewed  Nephrology following  S/p cystoscopy with stent placement on the right  Continue patient on bicarb 3 times daily  Patient does not qualify redemsivir secondary to acute kidney injury  IV fluids have been discontinued  Continue to hold his nephrotoxic medication including his lisinopril and hydrochlorothiazide along with allopurinol   Will repeat patient's chest x-ray.  At this time patient is not able to lay flat for a CT of the chest. Cannot not have contrast due to renal function.  May need to obtain a CT without contrast       Discussed plan with RN.    DVT prophylaxis:  Medical DVT prophylaxis orders are present.    CODE STATUS:   Level Of Support Discussed With: Patient  Code Status: CPR  Medical Interventions (Level of Support Prior to Arrest): Full

## 2021-09-09 NOTE — PROGRESS NOTES
[facility]   Urology Progress Note    Patient Name: Sabas Page  : 1960  MRN: [id number]  Primary Care Physician:  Daksha Arcos APRN  Date of admission: 2021    Subjective   Subjective     Chief Complaint: CHINO, SOA    HPI: s/p right ureteral stent placement yesterday.  Creatinine improved overnight.        Objective   Objective     Vitals:   Temp:  [98.7 °F (37.1 °C)-100.8 °F (38.2 °C)] 99.1 °F (37.3 °C)  Heart Rate:  [] 60  Resp:  [12-26] 26  BP: (130-151)/() 130/71  Flow (L/min):  [10-60] 60  Physical Exam    Constitutional: Awake, alert   Respiratory:  Nonlabored respirations   GI:  Abd soft, approp tender to palpation   Psychiatric: Appropriate affect, cooperative   Neurologic: Oriented x 3, strength symmetric in all extremities, Cranial Nerves grossly intact to confrontation, speech clear   Skin: No rashes     Result Review    Result Review:  I have personally reviewed the results from the time of this admission to 2021 08:35 EDT and agree with these findings:  [x]  Laboratory  []  Microbiology  [x]  Radiology  []  EKG/Telemetry   []  Cardiology/Vascular   []  Pathology  []  Old records  []  Other:    Assessment/Plan   Assessment / Plan       Active Hospital Problems:  Active Hospital Problems    Diagnosis    • COVID-19    • Acute hypoxemic respiratory failure due to COVID-19 (CMS/[facility])    • COVID-19 virus detected    • CHINO (acute kidney injury) (CMS/[facility])      Added automatically from request for surgery [id number]     • Hydronephrosis      Added automatically from request for surgery [id number]           Plan: I had difficulty advancing a 6 Dominican stent past the right UPJ and had to use a smaller 4.5 Dominican stent.  He will need a ureteroscopy after recovery from COVID to examine his right UPJ.  This can be arranged as an outpatient.  Please call if any further issues while admitted.      Electronically signed by Yesenia Roy MD, 21, 8:35 AM EDT.

## 2021-09-10 NOTE — PROGRESS NOTES
[facility]   Hospitalist Progress Note  Date: 9/10/2021  Patient Name: Sabas Page  : 1960  MRN: [id number]  Date of admission: 2021      Subjective   Subjective     Chief Complaint: Covid pneumonia weakness diarrhea nausea    Summary: 61 y.o. male with history of hypertension hyperlipidemia and gout who presented to the ED with diagnosis of COVID-19 10 days ago who is becoming more short of breath associated with weakness diarrhea nausea and vomiting.  Patient states that he has been having diarrhea 3-5 times a day for the past couple of days.  He cannot keep anything inside his body with nausea and vomiting.  Patient said he has been running fevers at home along with productive cough.  Patient was given Mucinex by his doctor but that did not help.  Patient never took his vaccine.  Patient is accompanied by his wife who also had COVID-19. His labs significant for severe CHINO creatinine of 12 bicarb of 13 potassium of 4.1.  Patient was given 2 L of fluids by the ED. hospital service was asked for admission    Interval Followup:   Patient continues to endorse shortness of breath.  Today patient is on Airvo he is currently at 60 L and 93%  Patient is status post cystoscopy with stent placement.  Patient's creatinine is much improved  Patient denies any nausea vomiting  Pulmonary is following patient.  Patient may require intubation.  Patient is okay with that.  I did call and update his wife.      Review of Systems  History obtained from the patient  General ROS:  Negative for - chills, fever, malaise, or night sweats  Psychological ROS: negative for - anxiety, depression, hallucinations, or mood swings  Ophthalmic ROS: negative for - blurry vision, double vision, or itchy eyes  ENT ROS: negative for - headaches, nasal congestion, sneezing, or sore throat  Hematological and Lymphatic ROS: negative for - bleeding problems, bruising, or jaundice  Endocrine ROS: negative for - malaise/lethargy,  polydipsia/polyuria, or temperature intolerance  Respiratory ROS: Positive for shortness of breath and cough  Cardiovascular ROS: negative for - chest pain, dyspnea on exertion, edema, palpitations, or paroxysmal nocturnal dyspnea  Gastrointestinal ROS: negative for - abdominal pain, constipation, diarrhea, heartburn, hematemesis, or nausea/vomiting  Genito-Urinary ROS: negative for - change in urinary stream, hematuria, incontinence, or urinary frequency/urgency  Musculoskeletal ROS: negative for - joint stiffness, joint swelling, muscle pain, or muscular weakness  Neurological ROS: negative for - confusion, dizziness, gait disturbance, headaches, impaired coordination/balance, memory loss, numbness/tingling, seizures, or visual changes  Dermatological ROS: negative for dry skin and rash       Objective   Objective     Vitals:   Temp:  [97.6 °F (36.4 °C)-98.8 °F (37.1 °C)] 97.6 °F (36.4 °C)  Heart Rate:  [0-89] 80  Resp:  [20-24] 20  BP: (145-159)/(68-91) 159/91  Flow (L/min):  [60] 60  Physical Exam    Constitutional: Awake, alert, no acute distress.  Lying in bed with Airvo in place patient does appear uncomfortable with his respiratory status however he denies feeling poorly   Eyes: Pupils equal, sclerae anicteric, no conjunctival injection   HENT: NCAT, mucous membranes moist   Neck: Supple,   Respiratory: Clear to auscultation bilaterally, labored respirations.  No wheezing rales or rhonchi   Cardiovascular: RRR, no murmurs,   Gastrointestinal: Positive bowel sounds, soft, nontender, nondistended   Musculoskeletal: No bilateral ankle edema, no clubbing or cyanosis to extremities   Psychiatric: Appropriate affect, cooperative   Neurologic: Oriented x 3, strength symmetric in all extremities, Cranial Nerves grossly intact to confrontation, speech clear   Skin: No rashes     Result Review    Result Review:  I have personally reviewed the results from the time of this admission to 9/10/2021 13:46 EDT and agree  with these findings:  [x]  Laboratory  [x]  Microbiology  [x]  Radiology  [x]  EKG/Telemetry   [x]  Cardiology/Vascular   []  Pathology  []  Old records  []  Other:    Assessment/Plan   Assessment / Plan     Assessment:  SARS-CoV-2 COVID-19 pneumonia  Acute hypoxemic respiratory failure from COVID-19 pneumonia requiring high flow oxygen  Severe chronic kidney injury with a creatinine of 12  Hypertension  Gout   Severe right-sided hydronephrosis    PLAN:   Continue patient on ceftriaxone and azithromycin given patient's elevated procalcitonin.  Repeat procalcitonin is coming down  Continue patient on Decadron a day 5 out of 10.   Continue to wean oxygen. Requirement has worsened and now on Airvo and nonrebreather.  At this time will obtain pulmonary consult to see if patient is a candidate for Actemra  Ultrasound of the kidneys reviewed.  CT of the abdomen reviewed  Nephrology following   We will give patient dose of IV Lasix  S/p cystoscopy with stent placement on the right  Continue patient on bicarb 3 times daily  Patient does not qualify redemsivir secondary to acute kidney injury  IV fluids have been discontinued   Continue to hold  his lisinopril and hydrochlorothiazide along with allopurinol    At this time patient is not able to lay flat for a CT of the chest. Cannot not have contrast due to renal function.  May need to obtain a CT without contrast       Discussed plan with RN.    DVT prophylaxis:  Medical DVT prophylaxis orders are present.  Continue Lovenox     CODE STATUS:   Level Of Support Discussed With: Patient  Code Status: CPR  Medical Interventions (Level of Support Prior to Arrest): Full      30 minutes of critical care time spent managing this patient's COVID-19 pneumonia, acute hypoxemic respiratory failure requiring air Vo, ARDS.

## 2021-09-10 NOTE — PROGRESS NOTES
[facility]     Nephrology Progress Note      Patient Name: Sabas Page  : 1960  MRN: [id number]  Primary Care Physician:  Daksha Arcos APRN  Date of admission: 2021    Subjective   Subjective     Interval History:  Patient is feeling okay.  More short of breath today.  Voiding well.    Seen earlier this morning.  This is a late note entry.    Review of Systems   All systems were reviewed and negative except for: What is mentioned above.    Objective   Objective     Vitals:   Temp:  [97.6 °F (36.4 °C)-98.8 °F (37.1 °C)] 97.6 °F (36.4 °C)  Heart Rate:  [0-89] 80  Resp:  [20-24] 20  BP: (145-159)/(68-91) 159/91  Flow (L/min):  [60] 60  Physical Exam:   Constitutional: Awake, alert, having worse respiratory difficulties today.  100% nonrebreather mask.   Eyes: sclerae anicteric, no conjunctival injection   HENT: mucous membranes moist   Neck: Supple, no thyromegaly, no lymphadenopathy, trachea midline, No JVD   Respiratory: Decreased to auscultation bilaterally, labored respiration.   Cardiovascular: RR, tachycardic, no murmurs, rubs, or gallops.   Gastrointestinal: Positive bowel sounds, soft, nontender, nondistended   Musculoskeletal: No edema, no clubbing or cyanosis   Psychiatric: Appropriate affect, cooperative   Neurologic: moving all extremities, Cranial Nerves grossly intact   Skin: warm and dry, no rashes     Result Review    Result Reviewed:  I have personally reviewed the results from the time of this admission to 9/10/2021 14:34 EDT and agree with these findings:  [x]  Laboratory  []  Microbiology  [x]  Radiology  []  EKG/Telemetry   []  Cardiology/Vascular   []  Pathology  []  Old records  []  Other:  Lab Results   Component Value Date    GLUCOSE 282 (H) 09/10/2021    CALCIUM 9.6 09/10/2021     09/10/2021    K 4.6 09/10/2021    CO2 17.8 (L) 09/10/2021     (H) 09/10/2021    BUN 77 (H) 09/10/2021    CREATININE 2.69 (H) 09/10/2021    EGFRIFAFRI  2021       Comment:      <15 Indicative of kidney failure.    EGFRIFNONA 24 (L) 09/10/2021    BCR 28.6 (H) 09/10/2021    ANIONGAP 14.2 09/10/2021     Lab Results   Component Value Date    CALCIUM 9.6 09/10/2021    PHOS 3.6 09/09/2021      Results from last 7 days   Lab Units 09/10/21  0523   MAGNESIUM mg/dL 1.9      US Renal Limited    Result Date: 9/7/2021  PROCEDURE: US RENAL LIMITED  COMPARISON: [facility], CT, CT ABDOMEN PELVIS WO CONTRAST, 9/06/2021, 20:55.  INDICATIONS: CHINO  FINDINGS:  Right kidney measures 14.9 centimeters in length.  Left kidney measures 11.7 centimeters in length.  There is moderate right-sided hydronephrosis.  The exam is limited by bowel gas.  No renal lesion is evident.  Left kidney demonstrates cortical thinning.  No left-sided hydronephrosis.  Bladder not fully distended but otherwise unremarkable.  Left-sided nephrolith better seen on recent CT.  CONCLUSION:  1. The appearance is similar to previous CT scan from 9/6/2021. 2. Moderate right-sided hydronephrosis which could partly relate to narrowing at the right UPJ.  Right ureter not seen on ultrasound. 3. Renal cortical thinning and/or scarring could be present. 4. Left-sided nephrolith  better seen on previous CT. 5. The bladder is not well-distended.      RICARDO FAULKNER MD       Electronically Signed and Approved By: RICARDO FAULKNER MD on 9/07/2021 at 9:04                Assessment/Plan   Assessment / Plan       Active Hospital Problems:  Active Hospital Problems    Diagnosis    • COVID-19    • Acute hypoxemic respiratory failure due to COVID-19 (CMS/[facility])    • COVID-19 virus detected    • CHINO (acute kidney injury) (CMS/[facility])      Added automatically from request for surgery [id number]     • Hydronephrosis      Added automatically from request for surgery [id number]         Assessment and Plan:  - Acute kidney injury most likely secondary to volume depletion and obstructive uropathy.  Has right hydro s/p cystoscopy and stent  placement. Creatinine is much better, down to 2.6 today. good urine output.  Off IV fluid.  Volume status is adequate.  As needed Lasix for respiratory status.  - History of chronic kidney disease stage III, baseline creatinine around 1.7.  Further work-up may be needed eventually.  - Hyponatremia with hypovolemia, resolved.  -  Metabolic acidosis secondary to renal failure, continue p.o. bicarb and monitor.   - Covid pneumonia with worsening respiratory status.  - History of hypertension, ACE inhibitor and hydrochlorothiazide are on hold for now.  - History of gout.  Will follow.

## 2021-09-10 NOTE — PLAN OF CARE
Goal Outcome Evaluation:           Progress: improving  Outcome Summary: Pt is extremely difficult. He is alert and oriented. continued to take his airvo out constantly thoroughout the night. He was on airvo and non-rebreather multiple times tonight. Pt struggled to recover at times. Deneen Haney RN

## 2021-09-10 NOTE — CONSULTS
Pulmonary / Critical Care Consult Note      Patient Name: Sabas Page  : 1960  MRN: [id number]  Primary Care Physician:  Daksha Arcos APRN  Referring Physician: Austen Whitman DO  Date of admission: 2021    Subjective   Subjective     Reason for Consult/ Chief Complaint:   Worsening hypoxic respiratory failure secondary to COVID-19     HPI:  Sabas Page is a 61 y.o. male with past medical history of HTN and hyperlipidemia presented to the ED for worsening shortness of breath after being diagnosed with COVID-19 approximately 10 days ago.  He was admitted to hospital and was also found to be in acute renal failure with Cr 12.3.  He underwent cystoscopy with right ureteral stent placement and Cr has improved to 2.69.  Over the last several days he has required increasing oxygen demand.  Our services was consulted for further evaluation and treatment of worsening hypoxic respiratory failure.  Chest x-ray reveals diffuse bilateral opacities.  Pro-Ventura is elevated at 2.09, WBC 7.86, and BNP 1884.  Upon exam he is lying in bed on air Vo 60 L 100% FiO2 and 100% nonrebreather with O2 sats 94%.  Airvo was not even in patient nose.  Placed air Vo back on and removed 100% nonrebreather and O2 sats maintained at 95%.  He has a safety sitter at bedside due to pulling oxygen off yesterday.  He is somewhat lethargic but is able to answer all questions appropriately and he is alert and oriented x3.  His wife is at home battling COVID-19.  He reports loss of taste and smell but has since regained.  He continues to have dyspnea at rest but denies any fever, chills, chest pain, nausea, vomiting, or diarrhea.     Review of Systems  Constitutional symptoms:  + fatigue, otherwise denied complaints   Ear, nose, throat: Denied complaints  Cardiovascular:  + GRAHAM, otherwise denied complaints  Respiratory: + cough, + dyspnea, otherwise denied complaints  Gastrointestinal: Denied complaints  Musculoskeletal:  Denied complaints  Genitourinary: Denied complaints  Allergy / Immunology: Denied complaints  Hematologic: Denied complaints  Neurologic: Denied complaints  Skin: Denied complaints  Endocrine: Denied complaints  Psychiatric: Denied complaints    Personal History     Past Medical History:   Diagnosis Date   • Broken bones    • Elevated cholesterol    • DIMAS (generalized anxiety disorder) 03/31/2020   • Gout 03/31/2020   • HLD (hyperlipidemia)    • HTN (hypertension)    • Hyperlipemia    • Stress 03/31/2020       Past Surgical History:   Procedure Laterality Date   • CYSTOSCOPY W/ URETERAL STENT PLACEMENT Right 9/8/2021    Procedure: CYSTOSCOPY URETERAL CATHETER INSERTION;  Surgeon: Yesenia Roy MD;  Location: [facility] MAIN OR;  Service: Urology;  Laterality: Right;   • KIDNEY STONE SURGERY     • KIDNEY SURGERY     • PERCUTANEOUS NEPHROSTOMY      cryoablation        Family History: family history includes Heart attack (age of onset: 92) in his father; Heart disease in his mother; Hypertension in his mother; Stroke in his mother; Stroke (age of onset: 70) in his brother. Otherwise pertinent FHx was reviewed and not pertinent to current issue.    Social History:  reports that he has never smoked. He has quit using smokeless tobacco.  His smokeless tobacco use included chew. He reports that he does not drink alcohol and does not use drugs.    Home Medications:  PARoxetine, allopurinol, ergocalciferol, ezetimibe, febuxostat, lisinopril-hydrochlorothiazide, nebivolol, and traZODone    Allergies:  No Known Allergies    Objective    Objective     Vitals:   Temp:  [97.6 °F (36.4 °C)-98.8 °F (37.1 °C)] 97.6 °F (36.4 °C)  Heart Rate:  [0-89] 80  Resp:  [20-24] 20  BP: (145-159)/(68-91) 159/91  Flow (L/min):  [60] 60    Physical Exam:  Physical Exam:  Vital Signs Reviewed   General: Obese male, sleepy, NAD on Airvo  HEENT:  PERRL, EOMI.  OP, nares clear, no sinus tenderness  Neck:  Supple, no JVD, no thyromegaly  Chest:  good  aeration, diminished with coarse crackles and rhonchi bilaterally, tympanic to percussion bilaterally, increased work of breathing noted on air Vo  CV: RRR, no MGR, pulses 2+, equal  Abd:  Soft, NT, ND, + BS, no HSM  EXT:  no clubbing, no cyanosis, no edema  Neuro:  A&Ox3, CN grossly intact, no focal deficits  Skin: No rashes or lesions noted     Result Review    Result Review:  I have personally reviewed the results from the time of this admission to 9/10/2021 12:42 EDT and agree with these findings:  [x]  Laboratory  [x]  Microbiology  [x]  Radiology  [x]  EKG/Telemetry   []  Cardiology/Vascular   []  Pathology  []  Old records  []  Other:  Most notable findings include: WBC 7.86, Procal 2.09, BNP 1884, Cr 2.69, K+ 4.6, Mg+ 1.9  CXR bilateral opacities.    COVID LABS:  Results From Last 14 Days   Lab Units 09/09/21  0520 09/07/21  0359 09/06/21  1754   PROBNP pg/mL 1,884.0* 233.1 298.3   CK TOTAL U/L  --   --  826*   CRP mg/dL  --   --  23.71*   D DIMER QUANT mg/L (FEU) 1.50* 1.26*  --    FERRITIN ng/mL  --   --  2,465.00*   LDH U/L 723* 552* 587*   PROCALCITONIN ng/mL 2.09*  --  9.03*   TROPONIN T ng/mL  --   --  0.028       Assessment/Plan   Assessment / Plan     Active Hospital Problems:  Active Hospital Problems    Diagnosis    • COVID-19    • Acute hypoxemic respiratory failure due to COVID-19 (CMS/[facility])    • COVID-19 virus detected    • CHINO (acute kidney injury) (CMS/[facility])      Added automatically from request for surgery [id number]     • Hydronephrosis      Added automatically from request for surgery [id number]       Impression:  Acute hypoxic respiratory failure requiring Airvo  COVID pneumonia  ARDS  Question community acquired pneumonia of unspecified organism  Acute cardiogenic pulmonary edema  Volume overload  Acute decompensated diastolic congestive heart failure  CHINO on CKD III, baseline Cr 1.7  HTN  Hyperlipidemia  Gout     Plan:  On Airvo.  Continue to wean O2 to keep O2 sats >90%.  Not a candidate for  Remdesivir d/t elevated renal function and greater than 10 day onset.  Give Actemra x1 today.  Monitor for clinical response  Monitor daily renal panel and LFTs.   Continue to trend inflammatory markers.  Will increase Decadron to 10 mg BID for a minimum of 10 days.  Give Lasix 40 mg IV x1.  Trend renal panel and electrolytes.  Procal 2.07.  Strep and legionella urinary antigen negative.  Will check sputum and MRSA PCR.  Continue Azithromycin and Ceftriaxone and will de-escalate based off cultures.  Will add Pulmicort and Brovana nebulizers.  Will add bronchopulmonary hygiene.  Encourage IS and flutter valve.  Encourage to prone and lay side to side.  Up to chair if not in prone position.  Nephrology on board and appreciate their assistance.    DVT prophylaxis:  Medical DVT prophylaxis orders are present.     Code Status and Medical Interventions:   Ordered at: 09/06/21 2010     Level Of Support Discussed With:    Patient     Code Status:    CPR     Medical Interventions (Level of Support Prior to Arrest):    Full      Labs, microbiology, imaging, notes and medications personally reviewed.  Discussed with primary service and bedside RN.    30 minutes of critical care time spent managing this patient's acute hypoxemic respiratory failure requiring air Vo, ARDS, COVID-19.  This was excluding procedures.    Electronically signed by PILAR Hua, 09/10/21, 3:32 PM EDT.  Electronically signed by Khoi Quintero MD, 09/10/21, 4:36 PM EDT.

## 2021-09-11 NOTE — PROGRESS NOTES
[facility]     Nephrology Progress Note      Patient Name: Sabas Page  : 1960  MRN: [id number]  Primary Care Physician:  Daksha Arcos APRN  Date of admission: 2021    Subjective   Subjective     Interval History:    Patient on high flow oxygen  Laying down on the side  Comfortable watching TV  Continues to have episode of severe desaturation on exertion  Decreased appetite, around 10% of his tray  No fevers or chills    Review of Systems   All systems were reviewed and negative except for: What is mentioned above.    Objective   Objective     Vitals:   Temp:  [97.3 °F (36.3 °C)-97.9 °F (36.6 °C)] 97.3 °F (36.3 °C)  Heart Rate:  [59-79] 77  Resp:  [18-20] 18  BP: (135-156)/(79-95) 154/95  Flow (L/min):  [50-60] 60  Physical Exam:   Constitutional: Awake, alert, having worse respiratory difficulties today.  100% nonrebreather mask.   Eyes: sclerae anicteric, no conjunctival injection   HENT: mucous membranes moist   Neck: Supple, no thyromegaly, no lymphadenopathy, trachea midline, No JVD   Respiratory: Decreased to auscultation bilaterally, labored respiration.   Cardiovascular: RR, tachycardic, no murmurs, rubs, or gallops.   Gastrointestinal: Positive bowel sounds, soft, nontender, nondistended   Musculoskeletal: No edema, no clubbing or cyanosis   Psychiatric: Appropriate affect, cooperative   Neurologic: moving all extremities, Cranial Nerves grossly intact   Skin: warm and dry, no rashes     Result Review    Result Reviewed:  I have personally reviewed the results from the time of this admission to 2021 19:38 EDT and agree with these findings:    Results from last 7 days   Lab Units 21  0504 09/10/21  0523 21  0520   WBC 10*3/mm3 6.33 7.86 9.30   HEMOGLOBIN g/dL 11.9* 12.2* 12.3*   HEMATOCRIT % 36.1* 36.5* 36.7*   PLATELETS 10*3/mm3 249 280 290     Results from last 7 days   Lab Units 21  0504 09/10/21  0523 21  0520 21  0359 21  1750    SODIUM mmol/L 140 141 141   < > 131*   POTASSIUM mmol/L 4.7 4.6 4.9   < > 4.4   CHLORIDE mmol/L 106 109* 110*   < > 87*   CO2 mmol/L 20.3* 17.8* 15.1*   < > 13.4*   BUN mg/dL 77* 77* 81*   < > 139*   CREATININE mg/dL 2.43* 2.69* 3.06*   < > 12.38*   CALCIUM mg/dL 9.6 9.6 9.1   < > 9.7   BILIRUBIN mg/dL 0.3  --   --   --  0.4   ALK PHOS U/L 61  --   --   --  58   ALT (SGPT) U/L 28  --   --   --  30   AST (SGOT) U/L 34  --   --   --  44*   GLUCOSE mg/dL 398* 282* 226*   < > 158*    < > = values in this interval not displayed.         Lab Results   Component Value Date    CALCIUM 9.6 09/11/2021    PHOS 3.6 09/09/2021      Results from last 7 days   Lab Units 09/11/21  0504   MAGNESIUM mg/dL 1.9      US Renal Limited    Result Date: 9/7/2021  PROCEDURE: US RENAL LIMITED  COMPARISON: [facility], CT, CT ABDOMEN PELVIS WO CONTRAST, 9/06/2021, 20:55.  INDICATIONS: CHINO  FINDINGS:  Right kidney measures 14.9 centimeters in length.  Left kidney measures 11.7 centimeters in length.  There is moderate right-sided hydronephrosis.  The exam is limited by bowel gas.  No renal lesion is evident.  Left kidney demonstrates cortical thinning.  No left-sided hydronephrosis.  Bladder not fully distended but otherwise unremarkable.  Left-sided nephrolith better seen on recent CT.  CONCLUSION:  1. The appearance is similar to previous CT scan from 9/6/2021. 2. Moderate right-sided hydronephrosis which could partly relate to narrowing at the right UPJ.  Right ureter not seen on ultrasound. 3. Renal cortical thinning and/or scarring could be present. 4. Left-sided nephrolith  better seen on previous CT. 5. The bladder is not well-distended.      RICARDO FAULKNER MD       Electronically Signed and Approved By: RICARDO FAULKNER MD on 9/07/2021 at 9:04                Assessment/Plan   Assessment / Plan       Active Hospital Problems:  Active Hospital Problems    Diagnosis    • COVID-19    • Acute hypoxemic respiratory failure  due to COVID-19 (CMS/[facility])    • COVID-19 virus detected    • CHINO (acute kidney injury) (CMS/[facility])      Added automatically from request for surgery [id number]     • Hydronephrosis      Added automatically from request for surgery [id number]         Assessment and Plan:    Acute kidney injury w CKD stage III  ( baseline cr of 1.7 )   -Creatinine peaked at 8.2 , currently 2.4  - secondary to volume depletion and obstructive uropathy.   - right hydronephrosis  s/p cystoscopy and stent placement. 9/8/21  -Creatinine is much better, down to 2.6 today.   -good urine output  -As needed Lasix for respiratory status.    Metabolic acidosis   -secondary to renal failure,   -continue p.o. bicarb and monitor.   -CO2 improving from 13 to 21    Covid pneumonia with respiratory distress   -On high supplemental oxygen  -On dexamethasone, Brovana, Pulmicort  -Respiratory therapy with incentive spirometer    Hyponatremia with hypovolemia  -resolved.    PLAN   -Follow renal function closely  -Avoid nephrotoxins  -Adjust medications to GFR

## 2021-09-11 NOTE — PLAN OF CARE
Goal Outcome Evaluation:           Progress: no change   Safety sitter at bedside. Pt restless throughout the night. Continues to remove airvo from nose multiple times despite redirection. Desats significantly with minimal activity.

## 2021-09-11 NOTE — PROGRESS NOTES
Pulmonary / Critical Care Progress Note      Patient Name: Sabas Page  : 1960  MRN: [id number]  Attending:  Austen Whitman DO  Date of admission: 2021    Subjective   Subjective   Critically ill with hypoxic respiratory failure secondary to COVID-19.     21--> COVID +.  No Remdesivir d/t out of window and elevated renal function.  Received Tocilizumab x1 9/10/21.     Over past 24 hours, continues maxed out on Airvo.  Continues on steroids and nebulizers.  Continues with safety sitter due to confusion and pulling off oxygen.     No acute events overnight.     This morning,   Lying in bed  On Airvo 60L FIO2 100% with O2 sats 96%  Decreased to 50L FIO2 90%  Awake and alert but intermittently confused  Dyspnea improving  Nonproductive cough  Using IS up to 1500  -1.7 L fluid balance  No chest pain  No fever or chills  Weak and fatigued     Review of Systems  General: + fatigue, otherwise denied complaints  Cardiovascular:  + GRAHAM, otherwise denied complaints  Respiratory:+ cough, + dyspnea, otherwise denied complaints  Gastrointestinal: Denied complaints       Objective   Objective     Vitals:   Temp:  [97.3 °F (36.3 °C)-98.1 °F (36.7 °C)] 97.9 °F (36.6 °C)  Heart Rate:  [62-80] 62  Resp:  [18-20] 18  BP: (135-159)/(79-92) 156/79  Flow (L/min):  [50-60] 50    Physical Exam   Vital Signs Reviewed   General: Obese male, Awake and  Alert, NAD on Airvo   HEENT:  PERRL, EOMI.  OP, nares clear  Chest:  good aeration, diminished with coarse crackles and rhonchi bilaterally, tympanic to percussion bilaterally, no work of breathing noted on Airvo  CV: RRR, no MGR, pulses 2+, equal.  Abd:  Obese, Soft, NT, ND, + BS, no HSM  EXT:  no clubbing, no cyanosis, no edema  Neuro:  A&Ox3, CN grossly intact, no focal deficits  Skin: No rashes or lesions noted     Result Review    Result Review:  I have personally reviewed the results from the time of this admission to 2021 10:37 EDT and agree with these  findings:  [x]  Laboratory  [x]  Microbiology  [x]  Radiology  [x]  EKG/Telemetry   []  Cardiology/Vascular   []  Pathology  []  Old records  []  Other:  Most notable findings include: WBC 6.3, Cr 2.43, K+ 4.7, Mg+ 1.9    Assessment/Plan   Assessment / Plan     Active Hospital Problems:  Active Hospital Problems    Diagnosis    • COVID-19    • Acute hypoxemic respiratory failure due to COVID-19 (CMS/[facility])    • COVID-19 virus detected    • CHINO (acute kidney injury) (CMS/[facility])      Added automatically from request for surgery [id number]     • Hydronephrosis      Added automatically from request for surgery [id number]       Impression:  Acute hypoxic respiratory failure requiring Airvo  COVID pneumonia  ARDS  Question community acquired pneumonia of unspecified organism  Acute cardiogenic pulmonary edema  Volume overload  Acute decompensated diastolic congestive heart failure  CHINO on CKD III, baseline Cr 1.7  HTN  Hyperlipidemia  Gout     Plan:  On Airvo.  Continue to wean O2 to keep O2 sats >90%.  Not a candidate for Remdesivir d/t elevated renal function and greater than 10 day onset.  Received Tocilizumab x1 9/10/21.  Monitor for clinical response.  Monitor daily renal panel and LFTs.   Continue to trend inflammatory markers.  Continue Decadron to 10 mg BID for a minimum of 10 days.  Give Lasix 40 mg IV x1.  Trend renal panel and electrolytes.  Procal trending down 9.03-->2.09 .  Infectious work-up negative. Completed course of Azithromycin and Ceftriaxone.  Continue nebulizers and bronchopulmonary hygeine.  Encourage IS and flutter valve.  Encourage to prone and lay side to side.  Up to chair if not in prone position.  Nephrology on board and appreciate their assistance.    DVT prophylaxis:  Medical DVT prophylaxis orders are present.    CODE STATUS:   Level Of Support Discussed With: Patient  Code Status: CPR  Medical Interventions (Level of Support Prior to Arrest): Full      Labs, microbiology, imaging, notes and  medications personally reviewed.  Discussed with primary service and bedside RN.     30 minutes of critical care time spent managing this patient's acute hypoxemic respiratory failure requiring air Vo, ARDS, COVID-19.  This was excluding procedures.    Electronically signed by PILAR Hua, 09/11/21, 10:30 AM EDT.  Electronically signed by Khoi Quintero MD, 09/11/21, 10:37 AM EDT.

## 2021-09-11 NOTE — PROGRESS NOTES
[facility]   Hospitalist Progress Note  Date: 2021  Patient Name: Sabas Page  : 1960  MRN: [id number]  Date of admission: 2021      Subjective   Subjective     Chief Complaint: Covid pneumonia weakness diarrhea nausea    Summary: 61 y.o. male with history of hypertension hyperlipidemia and gout who presented to the ED with diagnosis of COVID-19 10 days ago who is becoming more short of breath associated with weakness diarrhea nausea and vomiting.  Patient states that he has been having diarrhea 3-5 times a day for the past couple of days.  He cannot keep anything inside his body with nausea and vomiting.  Patient said he has been running fevers at home along with productive cough.  Patient was given Mucinex by his doctor but that did not help.  Patient never took his vaccine.  Patient is accompanied by his wife who also had COVID-19. His labs significant for severe CHINO creatinine of 12 bicarb of 13 potassium of 4.1.  Patient was given 2 L of fluids by the ED. hospital service was asked for admission    Interval Followup:   Patient continues to endorse shortness of breath.  Today patient is on Airvo he is currently at 60 L and 100%  Patient is status post cystoscopy with stent placement.  Patient's creatinine is much improved  Patient denies any nausea vomiting  Pulmonary is following patient.  Patient may require intubation.  Patient is okay with that.  Patient has a safety sitter secondary to pulling off his air Vo        Review of Systems  History obtained from the patient  General ROS:  Negative for - chills, fever, malaise, or night sweats  Psychological ROS: negative for - anxiety, depression, hallucinations, or mood swings  Ophthalmic ROS: negative for - blurry vision, double vision, or itchy eyes  ENT ROS: negative for - headaches, nasal congestion, sneezing, or sore throat  Hematological and Lymphatic ROS: negative for - bleeding problems, bruising, or jaundice  Endocrine ROS:  negative for - malaise/lethargy, polydipsia/polyuria, or temperature intolerance  Respiratory ROS: Positive for shortness of breath and cough  Cardiovascular ROS: negative for - chest pain, dyspnea on exertion, edema, palpitations, or paroxysmal nocturnal dyspnea  Gastrointestinal ROS: negative for - abdominal pain, constipation, diarrhea, heartburn, hematemesis, or nausea/vomiting  Genito-Urinary ROS: negative for - change in urinary stream, hematuria, incontinence, or urinary frequency/urgency  Musculoskeletal ROS: negative for - joint stiffness, joint swelling, muscle pain, or muscular weakness  Neurological ROS: negative for - confusion, dizziness, gait disturbance, headaches, impaired coordination/balance, memory loss, numbness/tingling, seizures, or visual changes  Dermatological ROS: negative for dry skin and rash       Objective   Objective     Vitals:   Temp:  [97.3 °F (36.3 °C)-97.9 °F (36.6 °C)] 97.3 °F (36.3 °C)  Heart Rate:  [59-79] 79  Resp:  [18-20] 18  BP: (135-156)/(79-95) 154/95  Flow (L/min):  [50-60] 50  Physical Exam    Constitutional: Awake, alert, no acute distress.  Lying in bed with Airvo in place patient does appear uncomfortable with his respiratory status however he denies feeling poorly   Eyes: Pupils equal, sclerae anicteric, no conjunctival injection   HENT: NCAT, mucous membranes moist   Neck: Supple,   Respiratory: Clear to auscultation bilaterally, labored respirations.  No wheezing rales or rhonchi   Cardiovascular: RRR, no murmurs,   Gastrointestinal: Positive bowel sounds, soft, nontender, nondistended   Musculoskeletal: No bilateral ankle edema, no clubbing or cyanosis to extremities   Psychiatric: Appropriate affect, cooperative   Neurologic: Oriented x 3, strength symmetric in all extremities, Cranial Nerves grossly intact to confrontation, speech clear   Skin: No rashes     Result Review    Result Review:  I have personally reviewed the results from the time of this admission  to 9/11/2021 16:16 EDT and agree with these findings:  [x]  Laboratory  [x]  Microbiology  [x]  Radiology  [x]  EKG/Telemetry   [x]  Cardiology/Vascular   []  Pathology  []  Old records  []  Other:    Assessment/Plan   Assessment / Plan     Assessment:  SARS-CoV-2 COVID-19 pneumonia  Acute hypoxemic respiratory failure from COVID-19 pneumonia requiring high flow oxygen  Severe chronic kidney injury with a creatinine of 12  Hypertension  Gout   Severe right-sided hydronephrosis    PLAN:   Completed course of ceftriaxone and azithromycin. Repeat procalcitonin is coming down  Continue patient on Decadron a day 6/10  Continue to wean oxygen. Requirement has worsened and now on Airvo and nonrebreather.  s/p Actemra on 9-10  Ultrasound of the kidneys reviewed.  CT of the abdomen reviewed  Nephrology following   S/p 1 time dose of Lasix on 9-10 and today   S/p cystoscopy with stent placement on the right  Continue patient on bicarb 3 times daily  Patient does not qualify redemsivir secondary to acute kidney injury   Encourage IS and flutter valve.   Encourage to prone and lay side to side.  Up to chair if not in prone position  .IV fluids have been discontinued   Continue to hold  his lisinopril and hydrochlorothiazide along with allopurinol    At this time patient is not able to lay flat for a CT of the chest. Cannot not have contrast due to renal function.  May need to obtain a CT without contrast       Discussed plan with RN.    DVT prophylaxis:  Medical DVT prophylaxis orders are present.  Continue Lovenox     CODE STATUS:   Level Of Support Discussed With: Patient  Code Status: CPR  Medical Interventions (Level of Support Prior to Arrest): Full      30 minutes of critical care time spent managing this patient's COVID-19 pneumonia, acute hypoxemic respiratory failure requiring air Vo, ARDS.

## 2021-09-12 NOTE — PROGRESS NOTES
[facility]   Hospitalist Progress Note  Date: 2021  Patient Name: Sabas Page  : 1960  MRN: [id number]  Date of admission: 2021      Subjective   Subjective     Chief Complaint: Covid pneumonia weakness diarrhea nausea    Summary: 61 y.o. male with history of hypertension hyperlipidemia and gout who presented to the ED with diagnosis of COVID-19 10 days ago who is becoming more short of breath associated with weakness diarrhea nausea and vomiting.  Patient states that he has been having diarrhea 3-5 times a day for the past couple of days.  He cannot keep anything inside his body with nausea and vomiting.  Patient said he has been running fevers at home along with productive cough.  Patient was given Mucinex by his doctor but that did not help.  Patient never took his vaccine.  Patient is accompanied by his wife who also had COVID-19. His labs significant for severe CHINO creatinine of 12 bicarb of 13 potassium of 4.1.  Patient was given 2 L of fluids by the ED. hospital service was asked for admission    Interval Followup:   Patient has worsening confusion. He is now in TATS. He will not keep Airvo on  Patient was given Haldol, Morphine and Ativan and is currently sleeping        Review of Systems  Unable to obtain due to patient sleeping due to medication       Objective   Objective     Vitals:   Temp:  [97.2 °F (36.2 °C)-97.7 °F (36.5 °C)] 97.5 °F (36.4 °C)  Heart Rate:  [] 111  Resp:  [18-24] 24  BP: (107-165)/() 107/62  Flow (L/min):  [60] 60  Physical Exam    Constitutional:sleeping. In TATs. Airvo and non-rebreather in place    Eyes: Pupils equal, sclerae anicteric, no conjunctival injection   HENT: NCAT, mucous membranes moist   Neck: Supple,   Respiratory: Clear to auscultation bilaterally, labored respirations.  No wheezing rales or rhonchi   Cardiovascular: RRR, no murmurs,   Gastrointestinal: Positive bowel sounds, soft, nontender, nondistended   Musculoskeletal: No  bilateral ankle edema, no clubbing or cyanosis to extremities   Psychiatric: Sleeping    Neurologic: no focal deficits    Skin: No rashes     Result Review    Result Review:  I have personally reviewed the results from the time of this admission to 9/12/2021 15:04 EDT and agree with these findings:  [x]  Laboratory  [x]  Microbiology  [x]  Radiology  [x]  EKG/Telemetry   [x]  Cardiology/Vascular   []  Pathology  []  Old records  []  Other:    Assessment/Plan   Assessment / Plan     Assessment:  SARS-CoV-2 COVID-19 pneumonia  Acute hypoxemic respiratory failure from COVID-19 pneumonia requiring high flow oxygen  Severe chronic kidney injury with a creatinine of 12  Hypertension  Gout   Severe right-sided hydronephrosis  Worsening delirium     PLAN:   Completed course of ceftriaxone and azithromycin. Repeat procalcitonin is coming down  Continue patient on Decadron a day 7/10  Continue to wean oxygen. Requirement has worsened and now on Airvo and nonrebreather.  s/p Actemra on 9-10  Ultrasound of the kidneys reviewed.  CT of the abdomen reviewed  Nephrology following   S/p 1 time dose of Lasix on 9-10 and 9-11  S/p cystoscopy with stent placement on the right  Continue patient on bicarb 3 times daily  Patient does not qualify redemsivir secondary to acute kidney injury   Encourage IS and flutter valve.  .IV fluids have been discontinued   Continue to hold  his lisinopril and hydrochlorothiazide along with allopurinol    At this time patient is not able to lay flat for a CT of the chest. Cannot not have contrast due to renal function.  May need to obtain a CT without contrast  Ativan, Haldol and Morphine for agitation -WILL MOVE TO ICU GIVEN WORSENING MENTAL STATUS, RESPIRATORY STATUS AND NEED FOR CLOSER MONITORING        Discussed plan with RN.    DVT prophylaxis:  Medical DVT prophylaxis orders are present.  Continue Lovenox     CODE STATUS:   Level Of Support Discussed With: Patient  Code Status: CPR  Medical  Interventions (Level of Support Prior to Arrest): Full      30 minutes of critical care time spent managing this patient's COVID-19 pneumonia, acute hypoxemic respiratory failure requiring air Vo, ARDS.

## 2021-09-12 NOTE — PROGRESS NOTES
[facility]     Nephrology Progress Note      Patient Name: Sabas Page  : 1960  MRN: [id number]  Primary Care Physician:  Daksha Arcos APRN  Date of admission: 2021    Subjective   Subjective     Interval History:    Patient currently on NPPV/NIV   Persist dyspneic w/ decreased appetite   No fevers or chills    Review of Systems   All systems were reviewed and negative except for: What is mentioned above.    Objective   Objective     Vitals:   Temp:  [97.2 °F (36.2 °C)-97.7 °F (36.5 °C)] 97.5 °F (36.4 °C)  Heart Rate:  [] 74  Resp:  [14-24] 20  BP: (107-165)/() 107/62  Flow (L/min):  [60] 60  Physical Exam:   Constitutional: Awake, alert, on NPPV/ NIV    Eyes: sclerae anicteric, no conjunctival injection   HENT: mucous membranes moist   Neck: Supple, no thyromegaly, no lymphadenopathy, trachea midline, No JVD   Respiratory: Decreased to auscultation bilaterally, labored respiration.   Cardiovascular: RRR no murmurs, rubs, or gallops.   Gastrointestinal: Positive bowel sounds, soft, nontender, nondistended   Musculoskeletal: No edema, no clubbing or cyanosis   Psychiatric: Appropriate affect, cooperative   Neurologic: moving all extremities, Cranial Nerves grossly intact   Skin: warm and dry, no rashes     Result Review    Result Reviewed:  I have personally reviewed the results from the time of this admission to 2021 17:00 EDT and agree with these findings:    Results from last 7 days   Lab Units 21  0429 21  0504 09/10/21  0523   WBC 10*3/mm3 11.60* 6.33 7.86   HEMOGLOBIN g/dL 13.1 11.9* 12.2*   HEMATOCRIT % 39.8 36.1* 36.5*   PLATELETS 10*3/mm3 287 249 280     Results from last 7 days   Lab Units 21  0431 21  0504 09/10/21  0523 21  0359 21  1754   SODIUM mmol/L 138 140 141   < > 131*   POTASSIUM mmol/L 4.4 4.7 4.6   < > 4.4   CHLORIDE mmol/L 102 106 109*   < > 87*   CO2 mmol/L 17.9* 20.3* 17.8*   < > 13.4*   BUN mg/dL 83* 77*  77*   < > 139*   CREATININE mg/dL 2.25* 2.43* 2.69*   < > 12.38*   CALCIUM mg/dL 9.6 9.6 9.6   < > 9.7   BILIRUBIN mg/dL 0.4 0.3  --   --  0.4   ALK PHOS U/L 82 61  --   --  58   ALT (SGPT) U/L 33 28  --   --  30   AST (SGOT) U/L 35 34  --   --  44*   GLUCOSE mg/dL 426* 398* 282*   < > 158*    < > = values in this interval not displayed.         Lab Results   Component Value Date    CALCIUM 9.6 09/12/2021    PHOS 3.6 09/09/2021      Results from last 7 days   Lab Units 09/12/21  0431   MAGNESIUM mg/dL 1.9      US Renal Limited    Result Date: 9/7/2021  PROCEDURE: US RENAL LIMITED  COMPARISON: [facility], CT, CT ABDOMEN PELVIS WO CONTRAST, 9/06/2021, 20:55.  INDICATIONS: CHINO  FINDINGS:  Right kidney measures 14.9 centimeters in length.  Left kidney measures 11.7 centimeters in length.  There is moderate right-sided hydronephrosis.  The exam is limited by bowel gas.  No renal lesion is evident.  Left kidney demonstrates cortical thinning.  No left-sided hydronephrosis.  Bladder not fully distended but otherwise unremarkable.  Left-sided nephrolith better seen on recent CT.  CONCLUSION:  1. The appearance is similar to previous CT scan from 9/6/2021. 2. Moderate right-sided hydronephrosis which could partly relate to narrowing at the right UPJ.  Right ureter not seen on ultrasound. 3. Renal cortical thinning and/or scarring could be present. 4. Left-sided nephrolith  better seen on previous CT. 5. The bladder is not well-distended.      RICARDO FAULKNER MD       Electronically Signed and Approved By: RICARDO FAULKNER MD on 9/07/2021 at 9:04                Assessment/Plan   Assessment / Plan       Active Hospital Problems:  Active Hospital Problems    Diagnosis    • COVID-19    • Acute hypoxemic respiratory failure due to COVID-19 (CMS/HCC)    • COVID-19 virus detected    • CHINO (acute kidney injury) (CMS/HCC)      Added automatically from request for surgery [id number]     • Hydronephrosis      Added  automatically from request for surgery [id number]       Lab Results   Lab Value Date/Time    CREATININE 2.25 (H) 09/12/2021 0431    CREATININE 2.43 (H) 09/11/2021 0504    CREATININE 2.69 (H) 09/10/2021 0523    CREATININE 3.06 (H) 09/09/2021 0520    CREATININE 3.77 (H) 09/08/2021 1414    CREATININE 4.61 (H) 09/08/2021 0526    CREATININE 6.52 (H) 09/07/2021 1758    CREATININE 8.28 (H) 09/07/2021 0827    CREATININE 9.51 (H) 09/07/2021 0359    CREATININE 12.38 (H) 09/06/2021 1754    CREATININE 1.70 (H) 04/01/2021 0754    CREATININE 1.93 (H) 10/02/2020 1638    CREATININE 1.58 (H) 10/01/2019 1010    CREATININE 1.60 (H) 04/02/2019 0806       Assessment and Plan:    Acute kidney injury w CKD stage III  ( baseline cr of 1.7 )   -Creatinine peaked at 12.38 , currently 2.5  - secondary to volume depletion and obstructive uropathy.   - right hydronephrosis  s/p cystoscopy and stent placement. 9/8/21  -Creatinine is much better, w/ good urine output   -As needed Lasix for respiratory status.    Metabolic acidosis   -secondary to renal failure,   -continue p.o. bicarb and monitor.   -CO2 improving from 13 to 17    Covid pneumonia with respiratory distress   -On high supplemental oxygen  -On dexamethasone, Brovana, Pulmicort  -Respiratory therapy with incentive spirometer    Hyponatremia with hypovolemia  -resolved.    PLAN   -Follow renal function closely  -Avoid nephrotoxins  -Adjust medications to GFR

## 2021-09-12 NOTE — NURSING NOTE
Patient noted to be desatting down to 77%, and would not rise above 84% on maxed out airvo and nonrebreather. PILAR Hua present on floor and notified of patient status. New orders for ABG's, and to place patient on bipap, to maintain sats above 90%. New orders to transfer patient to ICU. Patient transported by security, primary RN, and charge RN to   ICU. Patient immediately placed on bipap upon arrival to ICU.

## 2021-09-12 NOTE — PROGRESS NOTES
Pulmonary / Critical Care Progress Note      Patient Name: Sabas Page  : 1960  MRN: [id number]  Attending:  Austen Whitman DO  Date of admission: 2021    Subjective   Subjective   Critically ill with hypoxic respiratory failure secondary to COVID-19.     21--> COVID +.  No Remdesivir d/t out of window and elevated renal function.  Received Tocilizumab x1 9/10/21.     Over past 24 hours, Worsening delirium, pulling off oxygen, beng combative with staff.  Continues maxed out on Airvo.  Continues on steroids and nebulizers.        Overnight remained restless, getting up and pulling things off.  Restraints were applied.     This morning,   Lying in bed  On Airvo 60L FIO2 100% with O2 sats 90%  Delirious and trying to get out of restraints  Thinks he is in a corn field  Wants a knife  Dyspnea noted  -900 ml fluid balance  Weak and fatigued     Review of Systems  Unable to obtain due to AMS       Objective   Objective     Vitals:   Temp:  [97.2 °F (36.2 °C)-97.7 °F (36.5 °C)] 97.5 °F (36.4 °C)  Heart Rate:  [] 111  Resp:  [18-24] 24  BP: (107-165)/() 107/62  Flow (L/min):  [50-60] 60    Physical Exam   Vital Signs Reviewed   General: Obese male, delirious, not making any sense, mild respiratory distress on Airvo   HEENT:  PERRL, EOMI.  OP, nares clear  Chest:  good aeration, diminished with coarse crackles and rhonchi bilaterally, tympanic to percussion bilaterally, mild  work of breathing noted on Airvo  CV: RRR, no MGR, pulses 2+, equal  Abd:  Obese, Soft, NT, ND, + BS, no HSM  EXT:  no clubbing, no cyanosis, no edema  Neuro:  A&Ox0, CN grossly intact, no focal deficits  Skin: No rashes or lesions noted        Result Review    Result Review:  I have personally reviewed the results from the time of this admission to 2021 12:21 EDT and agree with these findings:  [x]  Laboratory  [x]  Microbiology  [x]  Radiology  [x]  EKG/Telemetry   []  Cardiology/Vascular   []  Pathology  []   Old records  []  Other:  Most notable findings include: WBC 11.6, Cr 2.25, K+ 4.4, Mg+ 1.9    Assessment/Plan   Assessment / Plan     Active Hospital Problems:  Active Hospital Problems    Diagnosis    • COVID-19    • Acute hypoxemic respiratory failure due to COVID-19 (CMS/[facility])    • COVID-19 virus detected    • CHINO (acute kidney injury) (CMS/[facility])      Added automatically from request for surgery [id number]     • Hydronephrosis      Added automatically from request for surgery [id number]       Impression:  Acute hypoxic respiratory failure requiring Airvo  COVID pneumonia  ARDS  Question community acquired pneumonia of unspecified organism  Acute cardiogenic pulmonary edema  Volume overload  Acute decompensated diastolic congestive heart failure  CHINO on CKD III, baseline Cr 1.7  HTN  Hyperlipidemia  Gout  Altered mental status  Toxic/metabolic encephalopathy  Acute hyperactive delirium     Plan:  Will add morphine, Ativan and Haldol prn.  This does not help, we may need to consider transferring to the intensive care unit starting Precedex infusion  On Airvo.  Continue to wean O2 to keep O2 sats >90%.  Not a candidate for Remdesivir d/t elevated renal function and greater than 10 day onset.  Received Tocilizumab x1 9/10/21.  Monitor for clinical response.  Monitor daily renal panel and LFTs.   Continue to trend inflammatory markers.  Continue Decadron to 10 mg BID for a minimum of 10 days.  Give Lasix 40 mg IV x1.  Trend renal panel and electrolytes.  Continue nebulizers and bronchopulmonary hygeine.  Encourage IS and flutter valve.  Encourage to prone and lay side to side.  Up to chair if not in prone position.  Nephrology on board and appreciate their assistance.  DVT prophylaxis:  Medical DVT prophylaxis orders are present.    CODE STATUS:   Level Of Support Discussed With: Patient  Code Status: CPR  Medical Interventions (Level of Support Prior to Arrest): Full    Labs, microbiology, imaging, notes and medications  personally reviewed.  Discussed with primary service and bedside RN.     30 minutes of critical care time spent managing this patient's acute hypoxemic respiratory failure requiring air Vo, ARDS, COVID-19.  This was excluding procedures.    Electronically signed by PILAR Hua, 09/12/21, 11:30 AM EDT.  Electronically signed by Khoi Quintero MD, 09/12/21, 12:21 PM EDT.

## 2021-09-12 NOTE — PLAN OF CARE
Goal Outcome Evaluation:            Pt has been restless and confused for most of shift. Pt frequently pulling airvo off and becomes beligerent when it is reapplied. Wrist restraints applied to allow pt to receive oxygen.

## 2021-09-13 NOTE — PROGRESS NOTES
Pulmonary / Critical Care Progress Note      Patient Name: Sabas Page  : 1960  MRN: [id number]  Attending:  Austen Whitman DO  Date of admission: 2021    Subjective   Subjective   Critically ill with hypoxic respiratory failure secondary to COVID-19.     21--> COVID +.  No Remdesivir d/t out of window and elevated renal function.  Received Tocilizumab x1 9/10/21.     Remains restless this morning.  Withdraws to pain.  Altered and not following commands but much less agitated  Was on Precedex this morning but discontinued due to bradycardia  Urology planning on doing right ureteroscopy with as an outpatient with retrograde pyelogram  Creatinine is stable.  Given IV Lasix this morning  Remains on NIPPV with 100% FiO2  Now with elevated glucose is over 300-400  D-dimer elevated at 32    Review of Systems  Unable to obtain due to AMS       Objective   Objective     Vitals:   Temp:  [98.4 °F (36.9 °C)] 98.4 °F (36.9 °C)  Heart Rate:  [64-89] 89  Resp:  [14-22] 16  BP: (115-146)/(85-99) 146/90  Flow (L/min):  [60] 60    Physical Exam   Vital Signs Reviewed   General: Obese male, delirious, not making any sense  HEENT:  PERRL, EOMI.  OP, nares clear  Chest: Poor aeration, diminished with coarse crackles and rhonchi bilaterally, tympanic to percussion bilaterally,  appears comfortable on BiPAP  CV: RRR, no MGR, pulses 2+, equal  Abd:  Obese, Soft, NT, ND, + BS, no HSM  EXT:  no clubbing, no cyanosis, no edema  Neuro:  A&Ox0, CN grossly intact, no focal deficits  Skin: No rashes or lesions noted        Result Review    Result Review:  I have personally reviewed the results from the time of this admission to 2021 13:45 EDT and agree with these findings:  [x]  Laboratory  [x]  Microbiology  [x]  Radiology  [x]  EKG/Telemetry   [x]  Cardiology/Vascular   []  Pathology  []  Old records  []  Other:      Assessment/Plan   Assessment / Plan     Active Hospital Problems:  Active Hospital Problems     Diagnosis    • COVID-19    • Acute hypoxemic respiratory failure due to COVID-19 (CMS/[facility])    • COVID-19 virus detected    • CHINO (acute kidney injury) (CMS/[facility])      Added automatically from request for surgery [id number]     • Hydronephrosis      Added automatically from request for surgery [id number]       Impression:  Acute hypoxic respiratory failure requiring Airvo  COVID pneumonia  ARDS  Question community acquired pneumonia of unspecified organism  Acute cardiogenic pulmonary edema  Volume overload  Acute decompensated diastolic congestive heart failure  CHINO on CKD III, baseline Cr 1.7  Hyperkalemia  HTN  Hyperlipidemia  Gout  Altered mental status  Toxic/metabolic encephalopathy  Acute hyperactive delirium  Steroid-induced hyperglycemia  Asymptomatic bradycardia, drug-induced     Plan:  Back off of Precedex given bradycardia.  Continue as needed morphine, Ativan and Haldol  Alternate between BiPAP and air Vo.  Continue to wean O2 to keep O2 sats >90%.  Not a candidate for Remdesivir d/t elevated renal function and greater than 10 day onset.  Received Tocilizumab x1 9/10/21.   Decrease Decadron to 6 mg daily given worsening mental status, uremia and renal failure.  Complete 10 days total  Trending inflammatory markers.  D-dimer markedly elevated.  Cannot get CTPA due to instability as well as renal failure.  Will start therapeutic anticoagulation with Lovenox/renally dosed and check leg Dopplers  Give 80 mg IV Lasix x1 and monitor response  Continue nebulizers and bronchopulmonary hygeine.  Encourage IS and flutter valve.  Encourage to prone and lay side to side.  Up to chair if not in prone position.  Nephrology on board and appreciate their assistance.  Start Levemir plus sliding scale insulin    DVT prophylaxis:  Medical DVT prophylaxis orders are present.    CODE STATUS:   Level Of Support Discussed With: Patient  Code Status: CPR  Medical Interventions (Level of Support Prior to Arrest): Full      Labs,  microbiology, imaging, notes and medications personally reviewed.  Discussed with primary service and bedside RN.     32 minutes of critical care time spent managing this patient's acute hypoxemic respiratory failure requiring air Vo, ARDS, COVID-19.  This was excluding procedures.    Electronically signed by Khoi Quintero MD, 09/13/21, 1:51 PM EDT.

## 2021-09-13 NOTE — PROGRESS NOTES
[facility]   Hospitalist Progress Note  Date: 2021  Patient Name: Sabas Page  : 1960  MRN: [id number]  Date of admission: 2021      Subjective   Subjective     Chief Complaint: Covid pneumonia weakness diarrhea nausea    Summary: 61 y.o. male with history of hypertension hyperlipidemia and gout who presented to the ED with diagnosis of COVID-19 10 days ago who is becoming more short of breath associated with weakness diarrhea nausea and vomiting.  Patient states that he has been having diarrhea 3-5 times a day for the past couple of days.  He cannot keep anything inside his body with nausea and vomiting.  Patient said he has been running fevers at home along with productive cough.  Patient was given Mucinex by his doctor but that did not help.  Patient never took his vaccine.  Patient is accompanied by his wife who also had COVID-19. His labs significant for severe CHINO creatinine of 12 bicarb of 13 potassium of 4.1.  Patient was given 2 L of fluids by the ED. hospital service was asked for admission    Interval Followup:   Patient has now been moved to the intensive care unit.  Patient continues to remain confused and tried to pull off his air Vo/BiPAP.  Patient was moved to the intensive care and started on a Precedex drip however he had some bradycardia with that therefore it was discontinued.  Patient continues to be agitated and remove his BiPAP.  Patient had to be put back in restraints.  Patient has as needed anxiety medicine ordered.    Patient has worsening confusion. He is now in TATS. He will not keep Airvo on  Patient was given Haldol, Morphine and Ativan and is currently sleeping        Review of Systems  Unable to obtain due to mental status    Objective   Objective     Vitals:   Temp:  [98.4 °F (36.9 °C)] 98.4 °F (36.9 °C)  Heart Rate:  [64-89] 89  Resp:  [14-22] 16  BP: (115-146)/(85-99) 146/90  Flow (L/min):  [60] 60  Physical Exam    Constitutional: Patient is lying in  bed with BiPAP in place however is agitated and moving around trying to pull it off his face   Eyes: Pupils equal, sclerae anicteric, no conjunctival injection   HENT: NCAT, mucous membranes moist   Neck: Supple,   Respiratory: Clear to auscultation bilaterally,  No wheezing rales or rhonchi   Cardiovascular: RRR, no murmurs,   Gastrointestinal: Positive bowel sounds, soft, nontender, nondistended   Musculoskeletal: No bilateral ankle edema, no clubbing or cyanosis to extremities   Psychiatric: Patient is agitated   Neurologic: No focal deficits   Skin: No rashes     Result Review    Result Review:  I have personally reviewed the results from the time of this admission to 9/13/2021 14:42 EDT and agree with these findings:  [x]  Laboratory  [x]  Microbiology  [x]  Radiology  [x]  EKG/Telemetry   [x]  Cardiology/Vascular   []  Pathology  []  Old records  []  Other:    Assessment/Plan   Assessment / Plan     Assessment:  SARS-CoV-2 COVID-19 pneumonia  Acute hypoxemic respiratory failure from COVID-19 pneumonia requiring air Vo/BiPAP  Toxic/metabolic encephalopathy  Acute delirium  Asymptomatic bradycardia, drug-induced, drug discontinued  Severe chronic kidney injury with a creatinine of 12  Hypertension  Gout   Severe right-sided hydronephrosis status post stent placement  Worsening delirium     PLAN:   Completed course of ceftriaxone and azithromycin. Repeat procalcitonin is coming down  At this time Precedex has been discontinued secondary to bradycardia.  Continue as needed morphine Ativan and Haldol  Patient had to be put back in soft restraints given that he was pulling off his BiPAP  Continue patient on Decadron a day 8/10 however steroid dose was decreased given his delirium   s/p Actemra on 9-10.  Patient not a candidate for remdesivir secondary to his acute renal failure  Ultrasound of the kidneys reviewed.  CT of the abdomen reviewed  Nephrology following   Lasix as needed  S/p cystoscopy with stent  placement on the right  Continue patient on bicarb 3 times daily   Encourage IS and flutter valve.  Restart IV fluids  Continue to hold  his lisinopril and hydrochlorothiazide along with allopurinol    At this time patient is not able to lay flat for a CT of the chest. Cannot not have contrast due to renal function.  May need to obtain a CT without contrast.  At this time has been started on therapeutic Lovenox given his increase in D-dimer  Check leg doppler study   Will obtain palliative care consult given patient's worsening respiratory status and confusion  Start patient on Levemir and sliding scale insulin  Continue bronchodilators       Discussed plan with RN.    DVT prophylaxis:  Medical DVT prophylaxis orders are present.  Continue Lovenox     CODE STATUS:   Level Of Support Discussed With: Patient  Code Status: CPR  Medical Interventions (Level of Support Prior to Arrest): Full      30 minutes of critical care time spent managing this patient's COVID-19 pneumonia, acute hypoxemic respiratory failure requiring air Vo, ARDS.

## 2021-09-13 NOTE — PROGRESS NOTES
Chart reviewed.  Patient not examined due to ICU status.      He will need cystoscopy, right retrograde pyelogram and possible right ureteroscopy as an outpatient once he is recovered from COVID-19. The stent can stay up to 6 months if needed.      Please call if further  issues.  Creatinine improved to 2.25 today.  Will continue to monitor.

## 2021-09-13 NOTE — CONSULTS
"Purpose of the visit was to evaluate for: goals of care/advanced care planning and support for patient/family. Spoke with RN and family and discussed palliative care, goals of care and resuscitation status.      Assessment: Patient is currently in over flow critical care unit. Updated by primary rn regarding patients current condition. Call placed to patients wife. Introduced self and explained role. Discussed and updated patients wife on patients current condition. Discussed goals of care. Patients wife reports patient did not have an advanced directive, and they \"never had conversations about his wishes\". Patients wife states \"in the emergency department he said he wanted everything to live\". Discussed code status, full code versus DNR. Patients wife requests patient remain full code at this time. Discussed patients wishes if patient requires intubation, patients wife states \"he would want everything to live\". Emotional support provided. Patients wife states patient would benefit from phone call or face time, palliative care will attempt this tomorrow, patients wife agreed. Patients wife given palliative care contact information and encouraged to call with questions/concerns.      Recommendations/Plan: Further decisions to be made based on clinical course.    Tasks Completed: Emotional Support.    Palliative care will continue to follow to support and assist.    GABRIELA Ellington, RN  Palliative Care       "

## 2021-09-13 NOTE — PROGRESS NOTES
[facility]     Nephrology Progress Note      Patient Name: Sabas Page  : 1960  MRN: [id number]  Primary Care Physician:  Daksha Arcos APRN  Date of admission: 2021    Subjective   Subjective     Interval History:    Patient currently on BiPAP.  100% FiO2  Persist dyspneic w/ decreased appetite   Agitated.    Review of Systems   Unable to obtain due to confusion    Objective   Objective     Vitals:   Temp:  [98.4 °F (36.9 °C)] 98.4 °F (36.9 °C)  Heart Rate:  [64-89] 89  Resp:  [15-26] 26  BP: (115-146)/(85-99) 146/90  Physical Exam:   Constitutional: Awake, alert, BiPAP   Eyes: sclerae anicteric, no conjunctival injection   HENT: mucous membranes moist   Neck: Supple, no thyromegaly, no lymphadenopathy, trachea midline, No JVD   Respiratory: Decreased to auscultation bilaterally, labored respiration.   Cardiovascular: RRR no murmurs, rubs, or gallops.   Gastrointestinal: Positive bowel sounds, soft, nontender, nondistended   Musculoskeletal: No edema, no clubbing or cyanosis   Psychiatric: Confused, agitated.  Wagner catheter in place.   Neurologic: moving all extremities,    Skin: warm and dry, no rashes     Result Review    Result Reviewed:   with these findings:    Results from last 7 days   Lab Units 21  0846 219 21  0504   WBC 10*3/mm3 13.80* 11.60* 6.33   HEMOGLOBIN g/dL 13.8 13.1 11.9*   HEMATOCRIT % 41.2 39.8 36.1*   PLATELETS 10*3/mm3 130* 287 249     Results from last 7 days   Lab Units 21  0846 21  0431 21  0504 21  0504   SODIUM mmol/L 146*  --  138  --  140   POTASSIUM mmol/L 5.1  --  4.4  --  4.7   CHLORIDE mmol/L 110*  --  102  --  106   CO2 mmol/L 20.9*  --  17.9*  --  20.3*   BUN mg/dL 108*  --  83*  --  77*   CREATININE mg/dL 2.38*  --  2.25*  --  2.43*   CALCIUM mg/dL 10.3  --  9.6  --  9.6   BILIRUBIN mg/dL 0.6  --  0.4  --  0.3   ALK PHOS U/L 91  --  82  --  61   ALT (SGPT) U/L 32  --  33  --  28   AST  (SGOT) U/L 34  --  35  --  34   GLUCOSE mg/dL 414*  --  426*   < > 398*   GLUCOSE, ARTERIAL mmol/L  --  340*  --   --   --     < > = values in this interval not displayed.         Lab Results   Component Value Date    CALCIUM 10.3 09/13/2021    PHOS 4.8 (H) 09/13/2021      Results from last 7 days   Lab Units 09/13/21  0846   MAGNESIUM mg/dL 2.7*      US Renal Limited    Result Date: 9/7/2021  PROCEDURE: US RENAL LIMITED  COMPARISON: [facility], CT, CT ABDOMEN PELVIS WO CONTRAST, 9/06/2021, 20:55.  INDICATIONS: CHINO  FINDINGS:  Right kidney measures 14.9 centimeters in length.  Left kidney measures 11.7 centimeters in length.  There is moderate right-sided hydronephrosis.  The exam is limited by bowel gas.  No renal lesion is evident.  Left kidney demonstrates cortical thinning.  No left-sided hydronephrosis.  Bladder not fully distended but otherwise unremarkable.  Left-sided nephrolith better seen on recent CT.  CONCLUSION:  1. The appearance is similar to previous CT scan from 9/6/2021. 2. Moderate right-sided hydronephrosis which could partly relate to narrowing at the right UPJ.  Right ureter not seen on ultrasound. 3. Renal cortical thinning and/or scarring could be present. 4. Left-sided nephrolith  better seen on previous CT. 5. The bladder is not well-distended.      RICARDO FAULKNER MD       Electronically Signed and Approved By: RICARDO FAULKNER MD on 9/07/2021 at 9:04                Assessment/Plan   Assessment / Plan       Active Hospital Problems:  Active Hospital Problems    Diagnosis    • COVID-19    • Acute hypoxemic respiratory failure due to COVID-19 (CMS/HCC)    • COVID-19 virus detected    • CHINO (acute kidney injury) (CMS/[facility])      Added automatically from request for surgery [id number]     • Hydronephrosis      Added automatically from request for surgery [id number]       Lab Results   Lab Value Date/Time    CREATININE 2.38 (H) 09/13/2021 0846    CREATININE 2.25 (H) 09/12/2021 0431     CREATININE 2.43 (H) 09/11/2021 0504    CREATININE 2.69 (H) 09/10/2021 0523    CREATININE 3.06 (H) 09/09/2021 0520    CREATININE 3.77 (H) 09/08/2021 1414    CREATININE 4.61 (H) 09/08/2021 0526    CREATININE 6.52 (H) 09/07/2021 1758    CREATININE 8.28 (H) 09/07/2021 0827    CREATININE 9.51 (H) 09/07/2021 0359    CREATININE 12.38 (H) 09/06/2021 1754    CREATININE 1.70 (H) 04/01/2021 0754    CREATININE 1.93 (H) 10/02/2020 1638    CREATININE 1.58 (H) 10/01/2019 1010    CREATININE 1.60 (H) 04/02/2019 0806       Assessment and Plan:    - Acute kidney injury w CKD stage III  ( baseline cr of 1.7 ),Creatinine peaked at 12.38 , currently 2.5,  secondary to volume depletion and obstructive uropathy., right hydronephrosis  s/p cystoscopy and stent placement. 9/8/21.  Now with good urine output, continue as needed diuretics for respiratory status.  - Metabolic acidosis, secondary to renal failure, improved.  - Covid pneumonia with respiratory distress, On high supplemental oxygen, per pulmonary.  - Hyponatremia, resolved.   - Hypernatremia.  His sodium level is higher than what it seems to be when corrected to glucose of 414.  Will start half-normal saline at 60 cc an hour and monitor.    Discussed with nursing staff.  Will follow.

## 2021-09-14 NOTE — CONSULTS
"Nutrition Services    Patient Name: Sabas Page  YOB: 1960  MRN: [id number]  Admission date: 9/6/2021      Clinical Nutrition Assessment      Reason for Assessment: Physician consult       Clinical Course/Narrative: Consult for nutrition assessment. Patient is on bipap and would not tolerate cortrak placement due to desaturating quickly. Palliative care is following. Per MD, will hold off on tube feeds for now. In the meantime, will order oral nutrition supplement to help meet estimated needs. Boost Very High Calorie TID will provide 1590 kcal, 66 g protein daily. Will continue to monitor and follow per protocol.            Past Medical History:   Diagnosis Date   • Broken bones    • Elevated cholesterol    • DIMAS (generalized anxiety disorder) 03/31/2020   • Gout 03/31/2020   • HLD (hyperlipidemia)    • HTN (hypertension)    • Hyperlipemia    • Stress 03/31/2020       Past Surgical History:   Procedure Laterality Date   • CYSTOSCOPY W/ URETERAL STENT PLACEMENT Right 9/8/2021    Procedure: CYSTOSCOPY URETERAL CATHETER INSERTION;  Surgeon: Yesenia Roy MD;  Location: [facility];  Service: Urology;  Laterality: Right;   • KIDNEY STONE SURGERY     • KIDNEY SURGERY     • PERCUTANEOUS NEPHROSTOMY      cryoablation           Nutrition Intervention:        PO Diet/Supplements: Diet Regular   Boost Very High Calorie TID        Intake/Output:   Intake/Output Summary (Last 24 hours) at 9/14/2021 1356  Last data filed at 9/14/2021 1300  Gross per 24 hour   Intake 950 ml   Output --   Net 950 ml            Height: Height: 175.3 cm (69\")   Weight: Weight: 97.8 kg (215 lb 9.8 oz) (09/11/21 0600)   BMI: Body mass index is 31.84 kg/m².     Estimated/Assessed Needs       Energy Requirements    EST Needs (kcal/day) 7122-3187 kcal        Protein Requirements    EST Daily Needs (g/day) 115-195 g protein       Fluid Requirements     Estimated Needs (mL/day) 2871-2728 ml fluid      Results from last 7 days "   Lab Units 09/14/21 0252 09/13/21 0846 09/12/21 2009 09/12/21  0431   SODIUM mmol/L 149* 146*  --  138   POTASSIUM mmol/L 5.0 5.1  --  4.4   CHLORIDE mmol/L 112* 110*  --  102   CO2 mmol/L 17.0* 20.9*  --  17.9*   BUN mg/dL 141* 108*  --  83*   CREATININE mg/dL 3.00* 2.38*  --  2.25*   CALCIUM mg/dL 9.3 10.3  --  9.6   BILIRUBIN mg/dL 1.0 0.6  --  0.4   ALK PHOS U/L 110 91  --  82   ALT (SGPT) U/L 46* 32  --  33   AST (SGOT) U/L 49* 34  --  35   GLUCOSE mg/dL 440* 414*  --  426*   GLUCOSE, ARTERIAL mmol/L  --   --  340*  --      Results from last 7 days   Lab Units 09/14/21 0252 09/13/21 0846 09/13/21 0846 09/12/21 0431 09/09/21  0520   MAGNESIUM mg/dL 2.8*  --  2.7* 1.9  --    PHOSPHORUS mg/dL  --   --  4.8*  --    < >   HEMOGLOBIN g/dL 14.7   < > 13.8  --    < >   HEMATOCRIT % 44.9   < > 41.2  --    < >    < > = values in this interval not displayed.     No results found for: HGBA1C    Nutrition Diagnosis         Nutrition Dx Problem 1 Increased nutrient needs related to increased nutrient needs due to catabolic disease as evidenced by COVID-19 infection.        Nutrition Intervention         Boost Very High Calorie TID      Monitor/Evaluation        Monitor Per protocol, I&O, PO intake, Supplement intake, Pertinent labs, Weight, Symptoms, POC/GOC     Nutrition Discharge Plan         To be determined       RD to follow up per protocol.    Electronically signed by:  Cheri Regalado RD  09/14/21 13:56 EDT

## 2021-09-14 NOTE — PROGRESS NOTES
"Pulmonary / Critical Care Progress Note      Patient Name: Sabas Page  : 1960  MRN: [id number]  Attending:  Husam Paredes MD  Date of admission: 2021    Subjective   Subjective   Critically ill with hypoxic respiratory failure secondary to COVID-19.     21--> COVID +.  No Remdesivir d/t out of window and elevated renal function.  Received Tocilizumab x1 9/10/21.     Mental status remains poor.  Very late restless this morning  Maxed out on BiPAP  Renal function and uremia worse.  Intermittently spot dosing with Lasix  Steroids have since been stopped due to mental status and uremia  Desaturates when taking off BiPAP mask  Right lower extremity DVT noted on ultrasound yesterday  Had to stop Precedex yesterday due to bradycardia  Urology planning on doing right ureteroscopy with as an outpatient with retrograde pyelogram  Appreciate palliative care discussions.  At this point he remains a full code and wife states that he \" said he wants everything done\"      Review of Systems  Unable to obtain due to AMS       Objective   Objective     Vitals:   Temp:  [98.4 °F (36.9 °C)] 98.4 °F (36.9 °C)  Heart Rate:  [] 91  Resp:  [23-28] 23  BP: (102-144)/() 123/83  Flow (L/min):  [60] 60    Physical Exam   Vital Signs Reviewed   General: Obese male, delirious, not making any sense  HEENT:  PERRL, EOMI.  OP, nares clear  Chest: Poor aeration, diminished with coarse crackles and rhonchi bilaterally, tympanic to percussion bilaterally,  appears comfortable on BiPAP  CV: RRR, no MGR, pulses 2+, equal  Abd:  Obese, Soft, NT, ND, + BS, no HSM  EXT:  no clubbing, no cyanosis, no edema  Neuro:  A&Ox0, CN grossly intact, no focal deficits, very lethargic and agitated upon arousal skin: No rashes or lesions noted        Result Review    Result Review:  I have personally reviewed the results from the time of this admission to 2021 11:31 EDT and agree with these findings:  [x]  Laboratory  [x]  " Microbiology  [x]  Radiology  [x]  EKG/Telemetry   [x]  Cardiology/Vascular   []  Pathology  []  Old records  []  Other:      Assessment/Plan   Assessment / Plan     Active Hospital Problems:  Active Hospital Problems    Diagnosis    • COVID-19    • Acute hypoxemic respiratory failure due to COVID-19 (CMS/[facility])    • COVID-19 virus detected    • CHINO (acute kidney injury) (CMS/[facility])      Added automatically from request for surgery [id number]     • Hydronephrosis      Added automatically from request for surgery [id number]       Impression:  Acute hypoxic respiratory failure requiring Airvo  COVID pneumonia  ARDS  Question community acquired pneumonia of unspecified organism  Acute cardiogenic pulmonary edema  Volume overload  Acute decompensated diastolic congestive heart failure  CHINO on CKD III, baseline Cr 1.7  Uremia   hyperkalemia  HTN  Hyperlipidemia  Gout  Altered mental status  Toxic/metabolic encephalopathy  Acute hyperactive delirium  Steroid-induced hyperglycemia  Asymptomatic bradycardia, drug-induced  Right lower extremity DVT     Plan:  Continue as needed morphine, Ativan and Haldol  Alternate between BiPAP and air Vo.  Continue to wean O2 to keep O2 sats >90%.  Did not get remdesivir due to renal failure.  Given Actemra x1 on 9/10/2021  Given worsening uremia and mental status, discontinue steroids  Trending inflammatory markers.  Leg Doppler showed right lower extremity DVT yesterday.  Complete 3 months of anticoagulation.  Currently on renally dosed Lovenox  Give 80 mg IV Lasix x1 and monitor response monitoring renal function and electrolytes.  Appreciate nephrology assistance.  Gentle IV fluids per them  Continue nebulizers and bronchopulmonary hygeine.  Encourage IS and flutter valve.  Encourage to prone and lay side to side.  Mental status too poor to get into a chair  Continue Levemir plus sliding scale insulin    DVT prophylaxis:  Medical DVT prophylaxis orders are present.    CODE STATUS:   Level Of  Support Discussed With: Patient  Code Status: CPR  Medical Interventions (Level of Support Prior to Arrest): Full    Appreciate palliative care discussions that are ongoing.  Currently he remains a full code.  Unfortunately, given his renal failure, worsening COVID-19 ARDS and overall clinical picture, he is going to have an incredibly poor outcome.  I do not think you would be the best candidate for hemodialysis at this time nor would he be an ideal candidate for mechanical ventilation.  Would strongly consider at least a DNR status and would entertain the idea of him being comfort care at this point      Labs, microbiology, imaging, notes and medications personally reviewed.  Discussed on multidisciplinary critical care rounds. 32 minutes of critical care time spent managing this patient's acute hypoxemic respiratory failure requiring air Vo, ARDS, COVID-19.  This was excluding procedures.    Electronically signed by Khoi Quintero MD, 09/14/21, 11:33 AM EDT.

## 2021-09-14 NOTE — PROGRESS NOTES
[facility]     Nephrology Progress Note      Patient Name: Sabas Page  : 1960  MRN: [id number]  Primary Care Physician:  Daksha Arcos APRN  Date of admission: 2021    Subjective   Subjective     Interval History:    Patient currently on BiPAP.    Persist dyspneic w/ decreased appetite   Agitated, in restraints.    Review of Systems   Unable to obtain due to confusion    Objective   Objective     Vitals:   Temp:  [98.4 °F (36.9 °C)] 98.4 °F (36.9 °C)  Heart Rate:  [] 92  Resp:  [23-28] 23  BP: (102-144)/() 118/81  Flow (L/min):  [60] 60  Physical Exam:   Constitutional: Awake, alert, BiPAP   Eyes: sclerae anicteric, no conjunctival injection   HENT: mucous membranes moist   Neck: Supple, no thyromegaly, no lymphadenopathy, trachea midline, No JVD   Respiratory: Decreased to auscultation bilaterally, labored respiration.   Cardiovascular: RRR no murmurs, rubs, or gallops.   Gastrointestinal: Positive bowel sounds, soft, nontender, nondistended   Musculoskeletal: No edema, no clubbing or cyanosis   Psychiatric: Confused, agitated.  Wagner catheter in place.   Neurologic: moving all extremities,    Skin: warm and dry, no rashes     Result Review    Result Reviewed:   with these findings:    Results from last 7 days   Lab Units 21  0429   WBC 10*3/mm3 27.35* 13.80* 11.60*   HEMOGLOBIN g/dL 14.7 13.8 13.1   HEMATOCRIT % 44.9 41.2 39.8   PLATELETS 10*3/mm3 143 130* 287     Results from last 7 days   Lab Units 2146 21  0431   SODIUM mmol/L 149* 146*  --  138   POTASSIUM mmol/L 5.0 5.1  --  4.4   CHLORIDE mmol/L 112* 110*  --  102   CO2 mmol/L 17.0* 20.9*  --  17.9*   BUN mg/dL 141* 108*  --  83*   CREATININE mg/dL 3.00* 2.38*  --  2.25*   CALCIUM mg/dL 9.3 10.3  --  9.6   BILIRUBIN mg/dL 1.0 0.6  --  0.4   ALK PHOS U/L 110 91  --  82   ALT (SGPT) U/L 46* 32  --  33   AST (SGOT) U/L 49* 34  --  35    GLUCOSE mg/dL 440* 414*  --  426*   GLUCOSE, ARTERIAL mmol/L  --   --  340*  --          Lab Results   Component Value Date    CALCIUM 9.3 09/14/2021    PHOS 4.8 (H) 09/13/2021      Results from last 7 days   Lab Units 09/14/21  0252   MAGNESIUM mg/dL 2.8*      US Renal Limited    Result Date: 9/7/2021  PROCEDURE: US RENAL LIMITED  COMPARISON: [facility], CT, CT ABDOMEN PELVIS WO CONTRAST, 9/06/2021, 20:55.  INDICATIONS: CHINO  FINDINGS:  Right kidney measures 14.9 centimeters in length.  Left kidney measures 11.7 centimeters in length.  There is moderate right-sided hydronephrosis.  The exam is limited by bowel gas.  No renal lesion is evident.  Left kidney demonstrates cortical thinning.  No left-sided hydronephrosis.  Bladder not fully distended but otherwise unremarkable.  Left-sided nephrolith better seen on recent CT.  CONCLUSION:  1. The appearance is similar to previous CT scan from 9/6/2021. 2. Moderate right-sided hydronephrosis which could partly relate to narrowing at the right UPJ.  Right ureter not seen on ultrasound. 3. Renal cortical thinning and/or scarring could be present. 4. Left-sided nephrolith  better seen on previous CT. 5. The bladder is not well-distended.      RICARDO FAULKNER MD       Electronically Signed and Approved By: RICARDO FAULKNER MD on 9/07/2021 at 9:04                Assessment/Plan   Assessment / Plan       Active Hospital Problems:  Active Hospital Problems    Diagnosis    • COVID-19    • Acute hypoxemic respiratory failure due to COVID-19 (CMS/[facility])    • COVID-19 virus detected    • CHINO (acute kidney injury) (CMS/[facility])      Added automatically from request for surgery [id number]     • Hydronephrosis      Added automatically from request for surgery [id number]       Lab Results   Lab Value Date/Time    CREATININE 3.00 (H) 09/14/2021 0252    CREATININE 2.38 (H) 09/13/2021 0846    CREATININE 2.25 (H) 09/12/2021 0431    CREATININE 2.43 (H) 09/11/2021 0504    CREATININE  2.69 (H) 09/10/2021 0523    CREATININE 3.06 (H) 09/09/2021 0520    CREATININE 3.77 (H) 09/08/2021 1414    CREATININE 4.61 (H) 09/08/2021 0526    CREATININE 6.52 (H) 09/07/2021 1758    CREATININE 8.28 (H) 09/07/2021 0827    CREATININE 9.51 (H) 09/07/2021 0359    CREATININE 12.38 (H) 09/06/2021 1754    CREATININE 1.70 (H) 04/01/2021 0754    CREATININE 1.93 (H) 10/02/2020 1638    CREATININE 1.58 (H) 10/01/2019 1010    CREATININE 1.60 (H) 04/02/2019 0806       Assessment and Plan:    - Acute kidney injury w CKD stage III  ( baseline cr of 1.7 ),Creatinine peaked at 12.38 , currently 2.5->3.0,  secondary to volume depletion and obstructive uropathy., right hydronephrosis  s/p cystoscopy and stent placement. 9/8/21.  Now with good urine output, continue as needed diuretics for respiratory status.  - Metabolic acidosis, secondary to renal failure, increase bicarb to 1300 tid.  - Covid pneumonia with respiratory distress, On high supplemental oxygen, per pulmonary.  - Hypernatremia.  His sodium level is higher than what it seems to be when corrected to glucose of 400.  Will increase half-normal saline to 100 cc an hour and monitor.

## 2021-09-14 NOTE — NURSING NOTE
"Patient is currently in overflow critical care unit. Updated by primary rn regarding patients current condition. Call placed to patients wife. Updated patients wife on patients current condition and poor prognosis. Offered patients wife face time call to see patient. Assisted in face time call with patients wife. Patients wife requests to visit. Due to patients poor prognosis,  approved wife to visit today. Updated patients wife. Discussed and educated extensively on code status, full code versus dnr. Patients wife verbalizes understanding patient has poor prognosis. Patients wife states \"he wants everything to live\". Updated primary rn. Palliative care will continue to follow to support and assist.    GABRIELA Ellington, RN  Palliative Care     "

## 2021-09-15 NOTE — CONSULTS
Nutrition Services    Patient Name:  Sabas Page  YOB: 1960  MRN: [id number]  Admit Date:  9/6/2021    Per palliative care note, patient's family wishes to have full aggressive treatment including intubation. Patient will likely receive enteral access at time of intubation. If so, RD recommends Novasource Renal @ goal rate of 52 ml/hr with free water flushes of 75 ml q 2 hrs to provide 2496 kcal, 114 g protein, and 1786 ml fluid. Hypernatremia with free water deficit of about 3.7L. Will continue to monitor and adjust free water per protocol. Enteral nutrition orders may need to be adjusted if propofol sedation rate increases with intubation.    Electronically signed by:  Cheri Regalado RD  09/15/21 14:09 EDT

## 2021-09-15 NOTE — PLAN OF CARE
Goal Outcome Evaluation:   Pt intubated. 450/12/100/22. On vaso and levo. Start hemodialysis. Suzette Hill RN

## 2021-09-15 NOTE — PROCEDURES
Ultrasound-guided for vascular access    Ultrasound was used to identify the right internal jugular. Images saved. Site marked for central line    Form by DAISY Qureshi.  Supervised by myself.    CPT codes 99740    Electronically signed by Khoi Quintero MD, 09/15/21, 5:44 PM EDT.

## 2021-09-15 NOTE — PROGRESS NOTES
Pulmonary / Critical Care Progress Note      Patient Name: Sabas Page  : 1960  MRN: [id number]  Attending:  Husam Paredes MD  Date of admission: 2021    Subjective   Subjective   Critically ill with hypoxic respiratory failure secondary to COVID-19.     21--> COVID +.  No Remdesivir d/t out of window and elevated renal function.  Received Tocilizumab x1 9/10/21.     Minimal mental status.  Essentially obtunded  Maxed out on BiPAP  Renal function and uremia worse.  Did not respond at all to Lasix.  Now on some gentle IV fluids  Steroids were stopped yesterday  Desaturates when taking off BiPAP m desaturates off BiPAP  Wife coming in today.  Also talked with hospitalist and palliative care yesterday.  Also spoke with her on the phone today.  She states that he would want everything to live and request that we do everything to save his life  Calcium low this morning    Review of Systems  Cannot obtain due to altered mental status and obtundation       Objective   Objective     Vitals:   Temp:  [98.4 °F (36.9 °C)-100 °F (37.8 °C)] 100 °F (37.8 °C)  Heart Rate:  [] 88  Resp:  [16-25] 16  BP: ()/() 89/57  Flow (L/min):  [60] 60    Physical Exam   Vital Signs Reviewed   General: Obese male, delirious, not making any sense  HEENT:  PERRL, EOMI.  OP, nares clear  Chest: Poor aeration worsening coarse crackles and rhonchi bilaterally, tympanic to percussion bilaterally,  increased work of breathing on BiPAP  CV: RRR, no MGR, pulses 2+, equal  Abd:  Obese, Soft, NT, ND, + BS, no HSM  EXT:  no clubbing, no cyanosis, no edema  Neuro:  A&Ox0, CN grossly intact, no focal deficits, very lethargic and agitated upon arousal skin: No rashes or lesions noted        Result Review    Result Review:  I have personally reviewed the results from the time of this admission to 9/15/2021 11:55 EDT and agree with these findings:  [x]  Laboratory  [x]  Microbiology  [x]  Radiology  [x]  EKG/Telemetry    [x]  Cardiology/Vascular   []  Pathology  []  Old records  []  Other:      Assessment/Plan   Assessment / Plan     Active Hospital Problems:  Active Hospital Problems    Diagnosis    • COVID-19    • Acute hypoxemic respiratory failure due to COVID-19 (CMS/[facility])    • COVID-19 virus detected    • CHINO (acute kidney injury) (CMS/[facility])      Added automatically from request for surgery [id number]     • Hydronephrosis      Added automatically from request for surgery [id number]       Impression:  Acute hypoxic respiratory failure requiring Airvo  COVID pneumonia  ARDS  Question community acquired pneumonia of unspecified organism  Acute cardiogenic pulmonary edema  Volume overload  Acute decompensated diastolic congestive heart failure  CHINO on CKD III, baseline Cr 1.7  Uremia   hyperkalemia  HTN  Hyperlipidemia  Gout  Altered mental status  Toxic/metabolic encephalopathy  Acute hyperactive delirium  Steroid-induced hyperglycemia  Asymptomatic bradycardia, drug-induced  Right lower extremity DVT     Plan:  Continue as needed morphine, Ativan and Haldol  Alternate between BiPAP and air Vo.  Continue to wean O2 to keep O2 sats >90%.  Did not get remdesivir due to renal failure.  Given Actemra x1 on 9/10/2021  Remains off steroids.  Renal function and uremia still worsening.  Will discuss with nephrology, but probably needs to be dialyzed at this point.  Trending inflammatory markers.  On anticoagulation for DVT.  Will need 3 months of therapy  No more Lasix.  Start bicarb drip with D5 with 3 A of bicarb at 100 cc/h  Continue Levemir plus sliding scale insulin  Multiple ongoing discussions with palliative care and hospitalist with wife.  At this point she is coming in today to see her  again but states that after she leaves she would like everything done.  Everything in this setting would include intubation with mechanical ventilation, arterial line placement, dialysis catheter and planning for hemodialysis.  She states that  these were interventions that he would be okay with doing if it was to save his life.  Unfortunately, I think she has a poor understanding and he is going to do very poorly despite all these measures.  We will plan on doing all these procedures to the patient after she is comes in to see him per her request.    DVT prophylaxis:  Medical DVT prophylaxis orders are present.    CODE STATUS:   Level Of Support Discussed With: Patient  Code Status: CPR  Medical Interventions (Level of Support Prior to Arrest): Full      Labs, microbiology, imaging, notes and medications personally reviewed.  Discussed on multidisciplinary critical care rounds. 38 minutes of critical care time spent managing this patient's acute hypoxemic respiratory failure requiring air Vo, ARDS, COVID-19, altered mental status, renal failure.  This was excluding procedures.    Electronically signed by Khoi Quintero MD, 09/15/21, 11:57 AM EDT.

## 2021-09-15 NOTE — NURSING NOTE
Call received from patients wife for update regarding patients current condition. Discussed patients current condition based off chart review, patients wife requests face time today. Palliative care will continue to follow to support and assist.    SUPA EllingtonN, RN  Palliative Care

## 2021-09-15 NOTE — PROGRESS NOTES
[facility]     Nephrology Progress Note      Patient Name: Sabas Page  : 1960  MRN: [id number]  Primary Care Physician:  Daksha Arcos APRN  Date of admission: 2021    Subjective   Subjective     Interval History:    Patient seen earlier today.  Late note entry.  Discussed with family.  Just intubated, on 100% FiO2 and 15 of PEEP.  Low O2 sat.    Review of Systems   Unable to obtain due to sedation and intubation.    Objective   Objective     Vitals:   Temp:  [98.4 °F (36.9 °C)-100 °F (37.8 °C)] 100 °F (37.8 °C)  Heart Rate:  [] 91  Resp:  [16-25] 16  BP: ()/(46-84) 101/67  Arterial Line BP: ()/(53-80) 133/80  Flow (L/min):  [60] 60  FiO2 (%):  [75 %] 75 %  Physical Exam:   Constitutional: Sedated intubated, on vent.   Eyes: sclerae anicteric, no conjunctival injection   HENT: Orally intubated   Neck: Supple, no thyromegaly, no lymphadenopathy, trachea midline, No JVD   Respiratory: Decreased to auscultation bilaterally, labored respiration.   Cardiovascular: Tachycardic, no murmurs, rubs, or gallops.   Gastrointestinal: Positive bowel sounds, soft, nondistended   Musculoskeletal: No edema, no clubbing or cyanosis   Neurologic: Was all extremities,    Skin: warm and dry, no rashes     Result Review    Result Reviewed:   with these findings:    Results from last 7 days   Lab Units 09/15/21  04521  0846   WBC 10*3/mm3 54.97* 27.35* 13.80*   HEMOGLOBIN g/dL 15.6 14.7 13.8   HEMATOCRIT % 48.2 44.9 41.2   PLATELETS 10*3/mm3 182 143 130*     Results from last 7 days   Lab Units 09/15/21  0459 09/15/21  0336 21  0252 21  0846 21  0846   SODIUM mmol/L 149*  --  149*   < > 146*   SODIUM, ARTERIAL mmol/L  --  151.5*  --   --   --    POTASSIUM mmol/L 4.6  --  5.0  --  5.1   CHLORIDE mmol/L 117*  --  112*  --  110*   CO2 mmol/L 13.5*  --  17.0*  --  20.9*   BUN mg/dL 157*  --  141*  --  108*   CREATININE mg/dL 5.09*  --  3.00*  --   2.38*   CALCIUM mg/dL 6.9*  --  9.3  --  10.3   BILIRUBIN mg/dL 1.2  --  1.0  --  0.6   ALK PHOS U/L 127*  --  110  --  91   ALT (SGPT) U/L 56*  --  46*  --  32   AST (SGOT) U/L 68*  --  49*  --  34   GLUCOSE mg/dL 129*  --  440*   < > 414*   GLUCOSE, ARTERIAL mmol/L  --  135*  --   --   --     < > = values in this interval not displayed.         Lab Results   Component Value Date    CALCIUM 6.9 (L) 09/15/2021    PHOS 4.3 09/15/2021      Results from last 7 days   Lab Units 09/15/21  0459   MAGNESIUM mg/dL 2.5*      US Renal Limited    Result Date: 9/7/2021  PROCEDURE: US RENAL LIMITED  COMPARISON: [facility], CT, CT ABDOMEN PELVIS WO CONTRAST, 9/06/2021, 20:55.  INDICATIONS: CHINO  FINDINGS:  Right kidney measures 14.9 centimeters in length.  Left kidney measures 11.7 centimeters in length.  There is moderate right-sided hydronephrosis.  The exam is limited by bowel gas.  No renal lesion is evident.  Left kidney demonstrates cortical thinning.  No left-sided hydronephrosis.  Bladder not fully distended but otherwise unremarkable.  Left-sided nephrolith better seen on recent CT.  CONCLUSION:  1. The appearance is similar to previous CT scan from 9/6/2021. 2. Moderate right-sided hydronephrosis which could partly relate to narrowing at the right UPJ.  Right ureter not seen on ultrasound. 3. Renal cortical thinning and/or scarring could be present. 4. Left-sided nephrolith  better seen on previous CT. 5. The bladder is not well-distended.      RICARDO FAULKNER MD       Electronically Signed and Approved By: RICARDO FAULKNER MD on 9/07/2021 at 9:04                Assessment/Plan   Assessment / Plan       Active Hospital Problems:  Active Hospital Problems    Diagnosis    • COVID-19    • Acute hypoxemic respiratory failure due to COVID-19 (CMS/HCC)    • COVID-19 virus detected    • CHINO (acute kidney injury) (CMS/HCC)      Added automatically from request for surgery [id number]     • Hydronephrosis      Added  automatically from request for surgery [id number]       Lab Results   Lab Value Date/Time    CREATININE 5.09 (H) 09/15/2021 0459    CREATININE 3.00 (H) 09/14/2021 0252    CREATININE 2.38 (H) 09/13/2021 0846    CREATININE 2.25 (H) 09/12/2021 0431    CREATININE 2.43 (H) 09/11/2021 0504    CREATININE 2.69 (H) 09/10/2021 0523    CREATININE 3.06 (H) 09/09/2021 0520    CREATININE 3.77 (H) 09/08/2021 1414    CREATININE 4.61 (H) 09/08/2021 0526    CREATININE 6.52 (H) 09/07/2021 1758    CREATININE 8.28 (H) 09/07/2021 0827    CREATININE 9.51 (H) 09/07/2021 0359    CREATININE 12.38 (H) 09/06/2021 1754    CREATININE 1.70 (H) 04/01/2021 0754    CREATININE 1.93 (H) 10/02/2020 1638    CREATININE 1.58 (H) 10/01/2019 1010    CREATININE 1.60 (H) 04/02/2019 0806       Assessment and Plan:    - Acute kidney injury w CKD stage III  ( baseline cr of 1.7 ), Creatinine peaked at 12.38 , currently 2.5->3.0,  secondary to volume depletion and obstructive uropathy., right hydronephrosis  s/p cystoscopy and stent placement. 9/8/21.  Resolved.  - Recurrent anuric acute kidney injury in the setting of COVID sepsis with hypotension.  Now was worsening respiratory status and metabolic acidosis, will proceed with dialysis.  Has right IJ Shiley that was just placed.  Little urine output.    - Metabolic acidosis, severe, secondary to renal failure, dialysis today and continue bicarb replacement IV.   - Covid pneumonia with respiratory distress  - Hypernatremia.  We will try to correct gently with dialysis.  Discussed with family and ICU team.  Prognosis is poor.

## 2021-09-15 NOTE — PAYOR COMM NOTE
"Sabas Flanagan (61 y.o. Male)     Date of Birth Social Security Number Address Home Phone MRN    1960  [address] [place] [address] [phone] [id number]    Alevism Marital Status          None        Admission Date Admission Type Admitting Provider Attending Provider Department, Room/Bed    21 Emergency Husam Paredes MD Fioret, Daniel, MD [facility] INTENSIVE CARE UNIT, 1005/1    Discharge Date Discharge Disposition Discharge Destination                       Attending Provider: Husam Paredes MD    Allergies: No Known Allergies    Isolation: Contact Air, Spore   Infection: COVID (confirmed) (21), C.difficile (rule out) (21)   Code Status: CPR    Ht: 175.3 cm (69\")   Wt: 97.8 kg (215 lb 9.8 oz)    Admission Cmt: None   Principal Problem: None                Active Insurance as of 2021     Primary Coverage     Payor Plan Insurance Group Employer/Plan Group    [employer] [id number]     Payor Plan Address Payor Plan Phone Number Payor Plan Fax Number Effective Dates    [address] [phone]  2021 - None Entered    [place] [id number]       Subscriber Name Subscriber Birth Date Member ID       SABAS FLANAGAN 1960 [id number]                 Emergency Contacts      (Rel.) Home Phone Work Phone Mobile Phone    WARREN FLANAGAN (Spouse) -- -- [phone]        AUTH# [id number]       Physician Progress Notes (last 24 hours) (Notes from 21 0934 through 09/15/21 0934)      Husam Paredes MD at 21           [facility]   Hospitalist Progress Note  Date: 2021  Patient Name: Sabas Flanagan  : 1960  MRN: [id number]  Date of admission: 2021      Subjective   Subjective     Chief Complaint: Covid pneumonia weakness diarrhea nausea    Summary: 61M unvaccinated with pmhx of HTN and diagnosed with covid 10days prior to admission who p/w weakness, diarrhea, CHINO, and " hypoxia.  Patient with hydronephrosis and required right ureteral stent placement on 9/8 (will need follow up ureteroscopy after recovery from covid as outpatient).  Respiratory status worsened and patient requiring continuous bipap.  Having AMS and requiring sitter/sedation and close monitoring.  GOC discussions with MD and palliative and patient's wife - currently wishing for full code.  RLE DVT found on ultrasound and started on AC.    Procedures  9/8 Right ureteral stent placement     Interval Followup:   Patient remains confused, sitter at bedside, tried to pull off bipap and became agitated earlier today and required prn medications for agitation.  Was not able to tolerate precedex given bradycardia.  Patient unable to answer questions given ams.     Case discussed with wife/updated on current poor prognosis.  Wife was able to visit earlier today but was after sedating medications and therefore was unable to have additional discussion with patient.      Review of Systems  Unable to obtain due to mental status    Objective   Objective     Vitals:   Temp:  [98.4 °F (36.9 °C)] 98.4 °F (36.9 °C)  Heart Rate:  [] 84  Resp:  [23-26] 25  BP: (102-142)/() 119/84  Flow (L/min):  [60] 60     Physical Exam  Gen: sleeping/lethargic, resting in bed, in wrist restraints  HENT: no scleral icterus, no discharge, bipap mask in place   Resp: bilateral crackles, moderate aeration, tachypneic, on 100% fio2  CV: RRR, no LE pitting edema  GI: Abdomen soft, NT, ND, no guarding, +BS  Psych: not alert or oriented, tired and recently received sedating medications, briefly opens eyes to stimuli   Skin: no acute rashes or ulcers noted superficially    Vital signs, labs and relevant imaging reviewed.      Assessment/Plan   Assessment / Plan     Assessment:  • SARS-CoV-2 COVID-19 pneumonia  • Acute hypoxemic respiratory failure from COVID-19 pneumonia requiring air Vo/BiPAP  • Toxic/metabolic encephalopathy and hospital  delirium  • Asymptomatic bradycardia, precedex/drug-induced, drug discontinued  • RLE DVT  • CHINO on CKD~3 unable to specify further (baseline creatinine variable but near 1.7)   • Hypertension  • Severe right-sided hydronephrosis s/p ureteral stent on 9/8 (outpatient follow up scope required)  • Gout     -brov, pulm, nebs   -continuous bipap, cointinue sitter  -start insulin drip given hyperglycemia  -9/10: 1x actemra  -off steroids currently given uremia and degree of AMS   -prn ativan and haldol for sedation as needed as patient desat's when agitated or attempting to pull off bipap mask  -abx with ceftriaxone and azithro completed   -po bicarb not able to be tolerated; starting iv sodium bicarb; appreciate nephro assistance  -creatinine increased, monitor I/o  -start on lovenox, renally dosing for RLE DVT   -continue nebivolol, monitor blood pressure, HR stable, if further bradycardia/needing precedex will transition  -f/u am labs  -rest per orders    D/w pulmonology.  Discussed with pt's wife 9/14. Pt remains full code at this time      DVT prophylaxis:  Medical DVT prophylaxis orders are present.      CODE STATUS:   Level Of Support Discussed With: Patient  Code Status: CPR  Medical Interventions (Level of Support Prior to Arrest): Full         Electronically signed by Husam Paredes MD at 09/14/21 2223       Pooja Ponce, RN   Registered Nurse   Palliative Care   Nursing Note   Signed   Date of Service:  09/14/21 1544   Creation Time:  09/14/21 1544            Signed             Show:Clear all  [x]Manual[x]Template[]Copied    Added by:  [x]Pooja Ponce, RN    []Gerberver for details  Patient is currently in overflow critical care unit. Updated by primary rn regarding patients current condition. Call placed to patients wife. Updated patients wife on patients current condition and poor prognosis. Offered patients wife face time call to see patient. Assisted in face time call with patients wife. Patients wife requests to  "visit. Due to patients poor prognosis,  approved wife to visit today. Updated patients wife. Discussed and educated extensively on code status, full code versus dnr. Patients wife verbalizes understanding patient has poor prognosis. Patients wife states \"he wants everything to live\". Updated primary rn. Palliative care will continue to follow to support and assist.     GABRIELA Ellington, RN  Palliative Care                CONTACT   MARIA R S UTILIZATION REVIEW    [facility]  [address] [place] [address]  TAX ID [id number]  [facility]  [phone]       [phone]   [phone]    "

## 2021-09-15 NOTE — PROCEDURES
Central line placement note    Indication: Renal failure, need for renal replacement therapy    Consent obtained: From family    Timeout: Performed    Procedure: The patient was placed in the supine position and the skin over the patient's right internal jugular vein was prepped with chlorhexidine. The patient was draped with full body draped sterile procedure was used.  The skin was infiltrated with local anesthesia with over the insertion site 5 ML's of 1% lidocaine under ultrasound guidance.  Large-bore needle was used to cannulate the vessel with return of dark blood.  The guidewire was inserted into the needle using the Seldinger technique. Ultrasound was used to confirm the wire was in the vein. A triple-lumen Shiley catheter was placed into the vessel.  All 3 ports freely flush and draw.  The catheter was securely fastened the skin with suture.  Next a sterile dressing was placed and a x-ray confirmed positioning of the line.    The patient tolerated the procedure well    Complications: None    Performed by DAISY Qureshi.  Supervised by myself.    Electronically signed by Khoi Quintero MD, 09/15/21, 5:45 PM EDT.

## 2021-09-15 NOTE — PROCEDURES
Intubation Procedure Note    Indication:respiratory failure    Consent obtained: None, emergently performed    Timeout: Performed with nursing staff    Medications used:  Premedications: Ketamine  Paralytics: Rocuronium    Procedure: Patient was placed supine.  Cricoid pressure not needed.  Glide scope size 4.0 blade was inserted into the oropharynx the cords were clearly visualized as 8.0 ET tube was placed in between the cords.  Position was verified by auscultation and color change.  Device was secured at 24 cm at the lip. x-ray confirmed tube placement    The patient tolerated the procedure well.    Complications: None    Performed by DAISY Qureshi.  Supervised myself.    Electronically signed by Khoi Quintero MD, 09/15/21, 5:43 PM EDT.

## 2021-09-15 NOTE — PROCEDURES
Arterial Line Procedure Note     Indication: Shock and acidosis     Consent: From family  A time-out was completed verifying correct patient, procedure, site, positioning, and special equipment if applicable.      Molina´s test was negative. The patient´s right wrist was prepped and draped in sterile fashion. 1% Lidocaine was used to anesthetize the area. A short arterial line was introduced into the right radial artery under ultrasound guidance. The catheter was threaded over the guide wire and the needle was removed with appropriate pulsatile blood return. The catheter was then sutured in place to the skin and a sterile dressing applied. Perfusion to the extremity distal to the point of catheter insertion was checked and found to be adequate.      Estimated Blood Loss: None     The patient tolerated the procedure well and there were no complications.      Performed by DAISY Qureshi.  Supervised by myself.    Electronically signed by Khoi Quintero MD, 09/15/21, 5:46 PM EDT.

## 2021-09-15 NOTE — NURSING NOTE
Patient is currently in critical care overflow. Patients spouse allowed to visit due to patients decline. Patient will be intubated per family request for full aggressive treatment. Provided emotional support to patients wife at bedside alongside . Patients wife has palliative care contact info, encouraged her to call with any additional questions/concerns. Palliative care will continue to follow to support and assist.    GABRIELA Ellington, RN  Palliative Care

## 2021-09-15 NOTE — PROGRESS NOTES
[facility]   Hospitalist Progress Note  Date: 9/15/2021  Patient Name: Sabas Page  : 1960  MRN: [id number]  Date of admission: 2021      Subjective   Subjective     Chief Complaint: Covid pneumonia weakness diarrhea nausea    Summary: 61M unvaccinated with pmhx of HTN and diagnosed with covid 10days prior to admission who p/w weakness, diarrhea, CHINO, and hypoxia.  Patient with hydronephrosis and required right ureteral stent placement on  (will need follow up ureteroscopy after recovery from covid as outpatient).  Respiratory status worsened and patient requiring continuous bipap.  Having AMS and requiring sitter/sedation and close monitoring.  GOC discussions with MD and palliative and patient's wife - currently wishing for full code.  RLE DVT found on ultrasound and started on AC.  9/15 patient with progressive respiratory decline requiring intubation.  Continued goc discussion with wife wishing for continued full code.    Procedures   Right ureteral stent placement     Interval Followup:   Intubated earlier today for worsening respiratory distress.  Having hypotension requiring pressors.  Minimal uop noted.   Wife and son at bedside updated and questions addressed.  Full code status re-confirmed.  Family wishing for HD if needed.     Review of Systems  Unable to obtain due to mental status    Objective   Objective     Vitals:   Temp:  [100 °F (37.8 °C)] 100 °F (37.8 °C)  Heart Rate:  [] 106  Resp:  [16-25] 16  BP: ()/(46-78) 101/67  Arterial Line BP: ()/(53-80) 88/58  FiO2 (%):  [75 %] 75 %     Physical Exam  Gen: intubated, sedated, in bed, family at bedside  HENT: no scleral icterus, no discharge, ETT in place, NCAT   Resp: bilateral crackles, reduced in bases, vent breath sounds, symmetric chest rise   CV: elevated rate, RR, no LE pitting edema  Wagner with minimal uop   GI: Abdomen soft, ND, +BS  Psych: no agitation noted on sedation/vent   Skin: no acute rashes  or ulcers noted superficially    Vital signs, labs and relevant imaging reviewed.      Assessment/Plan   Assessment / Plan     Assessment:  • SARS-CoV-2 COVID-19 pneumonia  • Acute hypoxemic respiratory failure from COVID-19 pneumonia requiring air Vo/BiPAP  • Toxic/metabolic encephalopathy and hospital delirium  • Asymptomatic bradycardia, precedex/drug-induced, drug discontinued  • RLE DVT  • CHINO on CKD~3 unable to specify further (baseline creatinine variable but near 1.7)   • Hypertension  • Severe right-sided hydronephrosis s/p ureteral stent on 9/8 (outpatient follow up scope required)  • Gout     -brov, pulm, nebs   -requiring intubation 9/15  -insulin drip given hyperglycemia    *off steroids given significant uremia, if improving may resume   -9/10: 1x actemra  -wbc significantly increased, UA and procal concerning, 9/15 cefepime   -dc nebivolol; now requiring pressors   -fluids and bicarb; appreciate pulm and nephro assistance; discussed case  -worsening renal function, potential HD as per nephro  -continue on lovenox, renally dosing for RLE DVT; 3mo of therapy  -cont palliative discussions  -f/u am labs  -rest per orders    D/w pulmonology and nephro.  Very poor prognosis.  Full code. Monitoring closely in the ICU    DVT prophylaxis:  Medical DVT prophylaxis orders are present.      CODE STATUS:   Level Of Support Discussed With: Patient  Code Status: CPR  Medical Interventions (Level of Support Prior to Arrest): Full

## 2021-09-16 NOTE — SIGNIFICANT NOTE
Pt's wife and son returned to the hospital and taken to bedside. RN talked with family about whether she would want CPR done again on her . At the time, pt's wife asked to call her sister. While at bedside pt's wife and son discussed what to do and both agreed to withdraw care and focus on comfort measures only. Pt  at 14:23 with his family at bedside. Emotional support and bereavement support provided. Family selected [facility] in [place], [place].

## 2021-09-16 NOTE — PROGRESS NOTES
[facility]     Nephrology Progress Note      Patient Name: Sabas Page  : 1960  MRN: [id number]  Primary Care Physician:  Daksha Arcos APRN  Date of admission: 2021    Subjective   Subjective     Interval History:    Patient seen earlier today.  Late note entry.  Discussed with family.  Just intubated, on 100% FiO2 and 15 of PEEP.  Low O2 sat.    Review of Systems   Unable to obtain due to sedation and intubation.    Objective   Objective     Vitals:   Temp:  [96.5 °F (35.8 °C)-100 °F (37.8 °C)] 96.5 °F (35.8 °C)  Heart Rate:  [] 78  Resp:  [22-27] 22  BP: ()/(46-75) 101/67  Arterial Line BP: ()/(41-80) 87/50  FiO2 (%):  [55 %-100 %] 55 %  Physical Exam:   Constitutional: Sedated intubated, on vent.   Eyes: sclerae anicteric, no conjunctival injection   HENT: Orally intubated   Neck: Supple, no thyromegaly, no lymphadenopathy, trachea midline, No JVD   Respiratory: Decreased to auscultation bilaterally, labored respiration.   Cardiovascular: Tachycardic, no murmurs, rubs, or gallops.   Gastrointestinal: Positive bowel sounds, soft, nondistended   Musculoskeletal: No edema, no clubbing or cyanosis   Neurologic: Was all extremities,    Skin: warm and dry, no rashes     Result Review    Result Reviewed:   with these findings:    Results from last 7 days   Lab Units 09/16/21  0552 09/15/21  0459 09/14/21  0252   WBC 10*3/mm3 47.30* 54.97* 27.35*   HEMOGLOBIN g/dL 12.0* 15.6 14.7   HEMATOCRIT % 36.9* 48.2 44.9   PLATELETS 10*3/mm3 111* 182 143     Results from last 7 days   Lab Units 2152 09/15/21  0459 09/15/21  0336 09/14/21  0252 09/12/21  2009   SODIUM mmol/L 145 149*  --  149*  --    SODIUM, ARTERIAL mmol/L  --   --  151.5*  --   --    POTASSIUM mmol/L 5.8* 4.6  --  5.0  --    CHLORIDE mmol/L 108* 117*  --  112*  --    CO2 mmol/L 21.8* 13.5*  --  17.0*  --    BUN mg/dL 138* 157*  --  141*  --    CREATININE mg/dL 5.48* 5.09*  --  3.00*  --    CALCIUM mg/dL  4.8* 6.9*  --  9.3  --    BILIRUBIN mg/dL 1.9* 1.2  --  1.0  --    ALK PHOS U/L 136* 127*  --  110  --    ALT (SGPT) U/L 35 56*  --  46*  --    AST (SGOT) U/L 89* 68*  --  49*  --    GLUCOSE mg/dL 286* 129*  --  440*  --    GLUCOSE, ARTERIAL mmol/L  --   --  135*  --    < >    < > = values in this interval not displayed.         Lab Results   Component Value Date    CALCIUM 4.8 (C) 09/16/2021    PHOS 7.9 (H) 09/16/2021      Results from last 7 days   Lab Units 09/16/21  0552   MAGNESIUM mg/dL 2.0      US Renal Limited    Result Date: 9/7/2021  PROCEDURE: US RENAL LIMITED  COMPARISON: [facility], CT, CT ABDOMEN PELVIS WO CONTRAST, 9/06/2021, 20:55.  INDICATIONS: CHINO  FINDINGS:  Right kidney measures 14.9 centimeters in length.  Left kidney measures 11.7 centimeters in length.  There is moderate right-sided hydronephrosis.  The exam is limited by bowel gas.  No renal lesion is evident.  Left kidney demonstrates cortical thinning.  No left-sided hydronephrosis.  Bladder not fully distended but otherwise unremarkable.  Left-sided nephrolith better seen on recent CT.  CONCLUSION:  1. The appearance is similar to previous CT scan from 9/6/2021. 2. Moderate right-sided hydronephrosis which could partly relate to narrowing at the right UPJ.  Right ureter not seen on ultrasound. 3. Renal cortical thinning and/or scarring could be present. 4. Left-sided nephrolith  better seen on previous CT. 5. The bladder is not well-distended.      RICARDO FAULKNER MD       Electronically Signed and Approved By: RICARDO FAULKNER MD on 9/07/2021 at 9:04                Assessment/Plan   Assessment / Plan       Active Hospital Problems:  Active Hospital Problems    Diagnosis    • COVID-19    • Acute hypoxemic respiratory failure due to COVID-19 (CMS/HCC)    • COVID-19 virus detected    • CHINO (acute kidney injury) (CMS/HCC)      Added automatically from request for surgery [id number]     • Hydronephrosis      Added automatically  from request for surgery [id number]       Lab Results   Lab Value Date/Time    CREATININE 5.48 (H) 09/16/2021 0552    CREATININE 5.09 (H) 09/15/2021 0459    CREATININE 3.00 (H) 09/14/2021 0252    CREATININE 2.38 (H) 09/13/2021 0846    CREATININE 2.25 (H) 09/12/2021 0431    CREATININE 2.43 (H) 09/11/2021 0504    CREATININE 2.69 (H) 09/10/2021 0523    CREATININE 3.06 (H) 09/09/2021 0520    CREATININE 3.77 (H) 09/08/2021 1414    CREATININE 4.61 (H) 09/08/2021 0526    CREATININE 6.52 (H) 09/07/2021 1758    CREATININE 8.28 (H) 09/07/2021 0827    CREATININE 9.51 (H) 09/07/2021 0359    CREATININE 12.38 (H) 09/06/2021 1754    CREATININE 1.70 (H) 04/01/2021 0754    CREATININE 1.93 (H) 10/02/2020 1638    CREATININE 1.58 (H) 10/01/2019 1010    CREATININE 1.60 (H) 04/02/2019 0806       Assessment and Plan:    - Acute kidney injury w CKD stage III  ( baseline cr of 1.7 ), Creatinine peaked at 12.38 , currently 2.5->3.0,  secondary to volume depletion and obstructive uropathy., right hydronephrosis  s/p cystoscopy and stent placement. 9/8/21.  Resolved.  Temp dialysis catheter placed.  Dialysis today.  - Recurrent anuric acute kidney injury in the setting of COVID sepsis with hypotension.  Now was worsening respiratory status and metabolic acidosis, will proceed with dialysis.  Has right IJ Shiley that was just placed.  Little urine output.    - Metabolic acidosis, severe, secondary to renal failure, dialysis today and continue bicarb replacement IV.   - Covid pneumonia with respiratory distress  - Hypernatremia.  We will try to correct gently with dialysis.

## 2021-09-16 NOTE — SIGNIFICANT NOTE
Support provided to pt's wife at bedside before pt's intubation. Prayed over pt per wife's request.  will continue to follow and support as needed.

## 2021-09-16 NOTE — SIGNIFICANT NOTE
CODE BLUE called on pt. Call placed to pt's wife to notify her of what was going on with her . Encouraged her to come back to the hospital. Wife is on her way back to the hospital.

## 2021-09-16 NOTE — PROGRESS NOTES
[facility]   Hospitalist Progress Note  Date: 2021  Patient Name: Sabas Page  : 1960  MRN: [id number]  Date of admission: 2021      Subjective   Subjective     Chief Complaint: Covid pneumonia weakness diarrhea nausea    Summary: 61M unvaccinated with pmhx of HTN and diagnosed with covid 10days prior to admission who p/w weakness, diarrhea, CHINO, and hypoxia.  Patient with hydronephrosis and required right ureteral stent placement on  (will need follow up ureteroscopy after recovery from covid as outpatient).  Respiratory status worsened and patient requiring continuous bipap.  Having AMS and requiring sitter/sedation and close monitoring.  GOC discussions with MD and palliative and patient's wife - currently wishing for full code.  RLE DVT found on ultrasound and started on AC.  9/15 patient with progressive respiratory decline requiring intubation.  Continued goc discussion with wife wishing for continued full code.    : code blue shortly after CRRT started.  Patient had PEA arrest.  Wife notified and coming in to evaluate.     Procedures   Right ureteral stent placement     Interval Followup:   Code blue as noted above.  Patient in critical condition.  Pt's wife notified and coming to assess but currently is still wishing for full code as of time of phone discussion.  Was only able to tolerate <1hr of CRRT before the arrest.      Review of Systems  Unable to obtain due to mental status    Objective   Objective     Vitals:   Temp:  [96.5 °F (35.8 °C)-98.8 °F (37.1 °C)] 98.8 °F (37.1 °C)  Heart Rate:  [] 78  Resp:  [22-27] 22  BP: (101)/(67) 101/67  Arterial Line BP: ()/(41-80) 87/50  FiO2 (%):  [55 %-100 %] 55 %     Physical Exam  Gen: intubated, sedated, appears ill, nurse at bedside,   HENT: eye edema, ETT in place, dry mucosa   Resp: reduced breath sounds with coarse ronchi / vent breath sounds  CV: elevated rate, irregular, no LE pitting edema  GI: Abdomen  soft, ND, +BS  Neuro: no tremors, no agitation noted with sedation   Skin: cool but perfused, no acute rash noted     Vital signs, labs and relevant imaging reviewed.      Assessment/Plan   Assessment / Plan     Assessment:  • SARS-CoV-2 COVID-19 pneumonia  • Acute hypoxemic respiratory failure from COVID-19 pneumonia requiring air Vo/BiPAP  • Toxic/metabolic encephalopathy and hospital delirium  • PEA Arrest on 9/16   • Asymptomatic bradycardia, precedex/drug-induced, drug discontinued  • RLE DVT  • CHINO on CKD~3 unable to specify further (baseline creatinine variable but near 1.7)   • Hypertension  • Severe right-sided hydronephrosis s/p ureteral stent on 9/8 (outpatient follow up scope required)  • Gout     -brov, pulm, nebs   -requiring intubation 9/15  -9/10 actemra dose   -replace calcium, monitoring electrolytes closely, not tolerating CRRT  -very poor prognosis and unfortunately despite aggressive interventions is going to continue to decline and likely have further cardiac arrhythmias and arrest.   -wife coming to bedside to evaluate; recommending comfort measures only   -continue cefepime  -cont ppi  -pressors /vent mgmt as per critical care; case discussed - unfortunately agree with prognosis despite interventions  -insulin gtt   -lovenox for rle dvt   -cont palliative discussions after wife has a chance to visit   -monitor labs  -rest per orders    D/w pulmonology.  Very poor prognosis.  Full code currently    DVT prophylaxis:  Medical DVT prophylaxis orders are present.      CODE STATUS:   Level Of Support Discussed With: Patient  Code Status: CPR  Medical Interventions (Level of Support Prior to Arrest): Full

## 2021-09-16 NOTE — NURSING NOTE
PT WAS STARTED ON CRRT. PT RAN FOR LESS THAN 1 HR BEFORE HE LOST PULSE AND WAS PEA. CODE BLUE CALLED, CRRT STOPPED AND DISCONNECTED.     SEE CODE CHARTING FOR CODE BLUE. Marcelina Stewart RN

## 2021-09-16 NOTE — PROGRESS NOTES
Pulmonary / Critical Care Progress Note      Patient Name: Sabas Page  : 1960  MRN: [id number]  Attending:  Husam Paredes MD  Date of admission: 2021    Subjective   Subjective   Critically ill with hypoxic respiratory failure secondary to COVID-19.     21--> COVID +.  No Remdesivir d/t out of window and elevated renal function.  Received Tocilizumab x1 9/10/21.     Intubated yesterday.  Dialyzed yesterday.  On multiple pressors  Paralyzed, proned and on 15 of PEEP 100% FiO2  Still having ongoing palliative care discussions as his prognosis is very poor  Calcium low  White blood cell count down to 47    Review of Systems  Cannot obtain as patient is intubated, sedated and paralyzed       Objective   Objective     Vitals:   Temp:  [96.5 °F (35.8 °C)-100 °F (37.8 °C)] 96.5 °F (35.8 °C)  Heart Rate:  [] 78  Resp:  [22-27] 22  BP: ()/(46-75) 101/67  Arterial Line BP: ()/(41-80) 87/50  FiO2 (%):  [55 %-100 %] 55 %    Physical Exam   Vital Signs Reviewed   General: Obese male,  intubated, sedated and paralyzed  HEENT:  PERRL, EOMI.  OP with endotracheal tube  Chest: Poor aeration, worsening coarse crackles and rhonchi bilaterally, tympanic to percussion bilaterally,  synchronous with ventilator  CV: RRR, no MGR, pulses 2+, equal  Abd:  Obese, Soft, NT, ND, + BS, no HSM  EXT:  no clubbing, no cyanosis, no edema  Neuro: Intubated, sedated and paralyzed  Skin: No rashes or lesions noted        Result Review    Result Review:  I have personally reviewed the results from the time of this admission to 2021 08:06 EDT and agree with these findings:  [x]  Laboratory  [x]  Microbiology  [x]  Radiology  [x]  EKG/Telemetry   [x]  Cardiology/Vascular   []  Pathology  []  Old records  []  Other:      Assessment/Plan   Assessment / Plan     Active Hospital Problems:  Active Hospital Problems    Diagnosis    • COVID-19    • Acute hypoxemic respiratory failure due to COVID-19 (CMS/[facility])     • COVID-19 virus detected    • CHINO (acute kidney injury) (CMS/HCC)      Added automatically from request for surgery [id number]     • Hydronephrosis      Added automatically from request for surgery [id number]       Impression:  Acute hypoxic respiratory failure requiring mechanical ventilation  COVID pneumonia  ARDS  Question community acquired pneumonia of unspecified organism  Acute cardiogenic pulmonary edema  Volume overload  Acute decompensated diastolic congestive heart failure  CHINO on CKD III, baseline Cr 1.7 requiring renal replacement therapy  Uremia   hyperkalemia  HTN  Hyperlipidemia  Hypocalcemia  Gout  Altered mental status  Toxic/metabolic encephalopathy  Acute hyperactive delirium  Steroid-induced hyperglycemia  Asymptomatic bradycardia, drug-induced  Right lower extremity DVT     Plan:  Continue mechanical ventilation on current settings.  Wean O2 to keep SPO2 greater than 90%  Continue prone position  Continue heavy sedation and paralytics  Cannot give remdesivir due to renal failure. Given Actemra x1 on 9/10/2021  Now off of steroids given leukocytosis, uremia and poor mental status  Trending inflammatory markers.  Continue anticoagulation for DVT.  Will need 3 months of therapy  DC bicarb drip.  Dialyzed yesterday per nephrology.  Appreciate assistance  Trend renal function and electrolytes.  Replace calcium IV  Continue Levemir plus sliding scale insulin  Continue ongoing discussions with family and palliative care.  Overall his prognosis is poor.  I recommend comfort care.  In his current clinical situation, there is 0% chance of survival or meaningful recovery.    DVT prophylaxis:  Medical DVT prophylaxis orders are present.    CODE STATUS:   Level Of Support Discussed With: Patient  Code Status: CPR  Medical Interventions (Level of Support Prior to Arrest): Full      Labs, microbiology, imaging, notes and medications personally reviewed.  Discussed on multidisciplinary critical care rounds. 33  minutes of critical care time spent managing this patient's acute hypoxemic respiratory failure requiring air Vo, ARDS, COVID-19, altered mental status, renal failure.  This was excluding procedures.    Electronically signed by Khoi Quintero MD, 09/16/21, 8:08 AM EDT.

## 2021-09-16 NOTE — PROCEDURES
CODE BLUE procedure note    Responded to CODE BLUE.  I ran the code.  Patient in PEA arrest.  Patient on CRRT, paralyzed in prone position maxed out on multiple pressors.  Patient was given calcium, bicarb and epinephrine and CPR.  The patient ultimately had return of spontaneous circulation however is maxed out on multiple pressors and drips.  Any further CPR or ACLS would be futile at this point.  We have notified the wife and have asked her to come in and be with him.  If he codes again, I will not be doing ACLS on him at it is absolutely futile and will cause him more harm than good as it will be causing significant pain without any benefit of meaningful recovery.  I strongly recommend we focus on his comfort and let him die naturally this afternoon.    Electronically signed by Khoi Quintero MD, 09/16/21, 1:30 PM EDT.

## 2021-09-17 LAB
BACTERIA SPEC RESP CULT: ABNORMAL
BACTERIA SPEC RESP CULT: ABNORMAL
GRAM STN SPEC: ABNORMAL
GRAM STN SPEC: ABNORMAL

## 2021-09-17 NOTE — DISCHARGE SUMMARY
Brief Hospital Course: 61M unvaccinated with pmhx of HTN and diagnosed with covid 10days prior to admission who p/w weakness, diarrhea, CHINO, and hypoxia.  Patient with hydronephrosis and required right ureteral stent placement on 9/8 (will need follow up ureteroscopy after recovery from covid as outpatient).  Respiratory status worsened and patient requiring continuous bipap.  Having AMS and requiring sitter/sedation and close monitoring.  GOC discussions with MD and palliative and patient's wife - currently wishing for full code.  RLE DVT found on ultrasound and started on AC.  9/15 patient with progressive respiratory decline requiring intubation.  Continued goc discussion with wife wishing for continued full code.     9/16: code blue shortly after CRRT started.  Patient had PEA arrest.  Wife notified and coming in to evaluate and ultimately decided on withdrawing care and focusing on comfort measures only    Final Diagnosis:   · SARS-CoV-2 COVID-19 pneumonia  · Acute hypoxemic respiratory failure from COVID-19 pneumonia requiring air Vo/BiPAP  · Toxic/metabolic encephalopathy and hospital delirium  · PEA Arrest on 9/16   · Asymptomatic bradycardia, precedex/drug-induced, drug discontinued  · RLE DVT  · CHINO on CKD~3 unable to specify further (baseline creatinine variable but near 1.7)   · Hypertension  · Severe right-sided hydronephrosis s/p ureteral stent on 9/8 (outpatient follow up scope required)  · Gout     Time of Death: 14:23        unable to assess due to poor participation/comprehension

## 2021-09-19 DIAGNOSIS — M1A.0790 IDIOPATHIC CHRONIC GOUT OF ANKLE WITHOUT TOPHUS, UNSPECIFIED LATERALITY: ICD-10-CM

## 2021-09-19 RX ORDER — ALLOPURINOL 300 MG/1
TABLET ORAL
Qty: 60 TABLET | Refills: 0 | OUTPATIENT
Start: 2021-09-19

## 2021-09-20 LAB
BACTERIA SPEC AEROBE CULT: NORMAL
BACTERIA SPEC AEROBE CULT: NORMAL

## 2021-09-27 ENCOUNTER — ANESTHESIA (OUTPATIENT)
Dept: ICU | Facility: HOSPITAL | Age: 61
End: 2021-09-27

## 2021-10-01 RX ORDER — PAROXETINE 10 MG/1
TABLET, FILM COATED ORAL
Qty: 90 TABLET | Refills: 1 | OUTPATIENT
Start: 2021-10-01

## 2021-10-01 RX ORDER — ERGOCALCIFEROL 1.25 MG/1
CAPSULE ORAL
Qty: 13 CAPSULE | Refills: 1 | OUTPATIENT
Start: 2021-10-01

## (undated) DEVICE — CYSTO PACK: Brand: MEDLINE INDUSTRIES, INC.

## (undated) DEVICE — SOL IRRG H2O BG 3000ML STRL

## (undated) DEVICE — GLV SURG SENSICARE SLT PF LF 6.5 STRL

## (undated) DEVICE — TOWEL,OR,DSP,ST,BLUE,STD,4/PK,20PK/CS: Brand: MEDLINE

## (undated) DEVICE — CYSTO/BLADDER IRRIGATION SET, REGULATING CLAMP

## (undated) DEVICE — TRY PREP SCRB VAG PVP

## (undated) DEVICE — GW SUREGLIDE FLXTIP STR/TP .035 3X150CM

## (undated) DEVICE — Device

## (undated) DEVICE — GW PTFE/COAT STR/TP STFF/BDY .038 150CM STRL

## (undated) DEVICE — GOWN,REINFORCE,POLY,SIRUS,BREATH SLV,XLG: Brand: MEDLINE